# Patient Record
Sex: MALE | Race: WHITE | Employment: OTHER | URBAN - METROPOLITAN AREA
[De-identification: names, ages, dates, MRNs, and addresses within clinical notes are randomized per-mention and may not be internally consistent; named-entity substitution may affect disease eponyms.]

---

## 2017-03-20 RX ORDER — LOSARTAN POTASSIUM 50 MG/1
TABLET ORAL
Qty: 30 TABLET | Refills: 1 | Status: SHIPPED | OUTPATIENT
Start: 2017-03-20 | End: 2017-05-22 | Stop reason: SDUPTHER

## 2017-03-27 ENCOUNTER — TELEPHONE (OUTPATIENT)
Dept: FAMILY MEDICINE CLINIC | Age: 67
End: 2017-03-27

## 2017-03-27 RX ORDER — AZITHROMYCIN 250 MG/1
TABLET, FILM COATED ORAL
Qty: 1 PACKET | Refills: 0 | Status: SHIPPED | OUTPATIENT
Start: 2017-03-27 | End: 2017-04-06

## 2017-04-11 ENCOUNTER — TELEPHONE (OUTPATIENT)
Dept: FAMILY MEDICINE CLINIC | Age: 67
End: 2017-04-11

## 2017-04-12 ENCOUNTER — OFFICE VISIT (OUTPATIENT)
Dept: FAMILY MEDICINE CLINIC | Age: 67
End: 2017-04-12

## 2017-04-12 VITALS
HEART RATE: 56 BPM | WEIGHT: 240.5 LBS | OXYGEN SATURATION: 97 % | BODY MASS INDEX: 31.09 KG/M2 | TEMPERATURE: 97.7 F | SYSTOLIC BLOOD PRESSURE: 115 MMHG | DIASTOLIC BLOOD PRESSURE: 76 MMHG

## 2017-04-12 DIAGNOSIS — J32.9 RECURRENT SINUSITIS: Primary | ICD-10-CM

## 2017-04-12 PROCEDURE — 4040F PNEUMOC VAC/ADMIN/RCVD: CPT | Performed by: FAMILY MEDICINE

## 2017-04-12 PROCEDURE — 99213 OFFICE O/P EST LOW 20 MIN: CPT | Performed by: FAMILY MEDICINE

## 2017-04-12 PROCEDURE — 1036F TOBACCO NON-USER: CPT | Performed by: FAMILY MEDICINE

## 2017-04-12 PROCEDURE — G8427 DOCREV CUR MEDS BY ELIG CLIN: HCPCS | Performed by: FAMILY MEDICINE

## 2017-04-12 PROCEDURE — 1123F ACP DISCUSS/DSCN MKR DOCD: CPT | Performed by: FAMILY MEDICINE

## 2017-04-12 PROCEDURE — G8419 CALC BMI OUT NRM PARAM NOF/U: HCPCS | Performed by: FAMILY MEDICINE

## 2017-04-12 PROCEDURE — 3017F COLORECTAL CA SCREEN DOC REV: CPT | Performed by: FAMILY MEDICINE

## 2017-04-12 RX ORDER — FLUTICASONE PROPIONATE 50 MCG
2 SPRAY, SUSPENSION (ML) NASAL DAILY
Qty: 1 BOTTLE | Refills: 3 | Status: SHIPPED | OUTPATIENT
Start: 2017-04-12 | End: 2020-03-12

## 2017-04-12 RX ORDER — DOXYCYCLINE HYCLATE 100 MG/1
100 CAPSULE ORAL 2 TIMES DAILY
Qty: 20 CAPSULE | Refills: 0 | Status: SHIPPED | OUTPATIENT
Start: 2017-04-12 | End: 2017-04-22

## 2017-04-12 RX ORDER — METOPROLOL SUCCINATE 50 MG/1
25 TABLET, EXTENDED RELEASE ORAL DAILY
Qty: 1 TABLET | Refills: 0
Start: 2017-04-12 | End: 2022-04-01

## 2017-05-23 RX ORDER — LOSARTAN POTASSIUM 50 MG/1
TABLET ORAL
Qty: 30 TABLET | Refills: 5 | Status: SHIPPED | OUTPATIENT
Start: 2017-05-23 | End: 2017-11-03 | Stop reason: SDUPTHER

## 2017-07-15 LAB
BUN BLDV-MCNC: 27 MG/DL
CALCIUM SERPL-MCNC: NORMAL MG/DL
CHLORIDE BLD-SCNC: NORMAL MMOL/L
CO2: NORMAL MMOL/L
CREAT SERPL-MCNC: 1.49 MG/DL
GFR CALCULATED: NORMAL
GLUCOSE BLD-MCNC: 148 MG/DL
POTASSIUM SERPL-SCNC: NORMAL MMOL/L
SODIUM BLD-SCNC: NORMAL MMOL/L

## 2017-07-18 ENCOUNTER — TELEPHONE (OUTPATIENT)
Dept: FAMILY MEDICINE CLINIC | Age: 67
End: 2017-07-18

## 2017-08-02 DIAGNOSIS — N18.9 CKD (CHRONIC KIDNEY DISEASE), UNSPECIFIED STAGE: Primary | ICD-10-CM

## 2017-08-17 ENCOUNTER — OFFICE VISIT (OUTPATIENT)
Dept: FAMILY MEDICINE CLINIC | Age: 67
End: 2017-08-17

## 2017-08-17 VITALS
BODY MASS INDEX: 31.15 KG/M2 | OXYGEN SATURATION: 97 % | HEART RATE: 80 BPM | WEIGHT: 241 LBS | DIASTOLIC BLOOD PRESSURE: 74 MMHG | SYSTOLIC BLOOD PRESSURE: 111 MMHG

## 2017-08-17 DIAGNOSIS — R53.82 CHRONIC FATIGUE: ICD-10-CM

## 2017-08-17 DIAGNOSIS — G62.9 NEUROPATHY: Primary | ICD-10-CM

## 2017-08-17 PROCEDURE — 99213 OFFICE O/P EST LOW 20 MIN: CPT | Performed by: FAMILY MEDICINE

## 2017-08-17 PROCEDURE — 36415 COLL VENOUS BLD VENIPUNCTURE: CPT | Performed by: FAMILY MEDICINE

## 2017-08-17 PROCEDURE — G8419 CALC BMI OUT NRM PARAM NOF/U: HCPCS | Performed by: FAMILY MEDICINE

## 2017-08-17 PROCEDURE — 1036F TOBACCO NON-USER: CPT | Performed by: FAMILY MEDICINE

## 2017-08-17 PROCEDURE — G8427 DOCREV CUR MEDS BY ELIG CLIN: HCPCS | Performed by: FAMILY MEDICINE

## 2017-08-17 PROCEDURE — 3017F COLORECTAL CA SCREEN DOC REV: CPT | Performed by: FAMILY MEDICINE

## 2017-08-17 PROCEDURE — 1123F ACP DISCUSS/DSCN MKR DOCD: CPT | Performed by: FAMILY MEDICINE

## 2017-08-17 PROCEDURE — 4040F PNEUMOC VAC/ADMIN/RCVD: CPT | Performed by: FAMILY MEDICINE

## 2017-08-17 RX ORDER — GABAPENTIN 100 MG/1
CAPSULE ORAL
Qty: 90 CAPSULE | Refills: 3 | Status: SHIPPED | OUTPATIENT
Start: 2017-08-17 | End: 2017-09-18 | Stop reason: SDUPTHER

## 2017-08-17 RX ORDER — SULFAMETHOXAZOLE AND TRIMETHOPRIM 800; 160 MG/1; MG/1
TABLET ORAL
Refills: 0 | COMMUNITY
Start: 2017-08-14 | End: 2017-09-11 | Stop reason: ALTCHOICE

## 2017-08-18 LAB
GLUCOSE BLD-MCNC: 110 MG/DL (ref 70–99)
TSH REFLEX: 1.58 UIU/ML (ref 0.27–4.2)
VITAMIN B-12: 624 PG/ML (ref 211–911)

## 2017-09-11 ENCOUNTER — OFFICE VISIT (OUTPATIENT)
Dept: FAMILY MEDICINE CLINIC | Age: 67
End: 2017-09-11

## 2017-09-11 VITALS
SYSTOLIC BLOOD PRESSURE: 142 MMHG | BODY MASS INDEX: 30 KG/M2 | HEART RATE: 56 BPM | OXYGEN SATURATION: 94 % | HEIGHT: 75 IN | DIASTOLIC BLOOD PRESSURE: 84 MMHG | WEIGHT: 241.25 LBS

## 2017-09-11 DIAGNOSIS — M54.42 CHRONIC MIDLINE LOW BACK PAIN WITH BILATERAL SCIATICA: ICD-10-CM

## 2017-09-11 DIAGNOSIS — M54.41 CHRONIC MIDLINE LOW BACK PAIN WITH BILATERAL SCIATICA: ICD-10-CM

## 2017-09-11 DIAGNOSIS — G89.29 CHRONIC MIDLINE LOW BACK PAIN WITH BILATERAL SCIATICA: ICD-10-CM

## 2017-09-11 DIAGNOSIS — E78.2 MIXED HYPERLIPIDEMIA: ICD-10-CM

## 2017-09-11 DIAGNOSIS — G60.9 IDIOPATHIC PERIPHERAL NEUROPATHY: ICD-10-CM

## 2017-09-11 DIAGNOSIS — I10 ESSENTIAL HYPERTENSION: Primary | ICD-10-CM

## 2017-09-11 DIAGNOSIS — N18.3 CKD (CHRONIC KIDNEY DISEASE), STAGE 3 (MODERATE): ICD-10-CM

## 2017-09-11 PROCEDURE — 3017F COLORECTAL CA SCREEN DOC REV: CPT | Performed by: FAMILY MEDICINE

## 2017-09-11 PROCEDURE — G8417 CALC BMI ABV UP PARAM F/U: HCPCS | Performed by: FAMILY MEDICINE

## 2017-09-11 PROCEDURE — 99214 OFFICE O/P EST MOD 30 MIN: CPT | Performed by: FAMILY MEDICINE

## 2017-09-11 PROCEDURE — 1036F TOBACCO NON-USER: CPT | Performed by: FAMILY MEDICINE

## 2017-09-11 PROCEDURE — G8427 DOCREV CUR MEDS BY ELIG CLIN: HCPCS | Performed by: FAMILY MEDICINE

## 2017-09-11 PROCEDURE — 4040F PNEUMOC VAC/ADMIN/RCVD: CPT | Performed by: FAMILY MEDICINE

## 2017-09-11 PROCEDURE — 1123F ACP DISCUSS/DSCN MKR DOCD: CPT | Performed by: FAMILY MEDICINE

## 2017-09-11 RX ORDER — HYDROCODONE BITARTRATE AND ACETAMINOPHEN 5; 325 MG/1; MG/1
1 TABLET ORAL EVERY 6 HOURS PRN
Qty: 28 TABLET | Refills: 0 | Status: SHIPPED | OUTPATIENT
Start: 2017-09-11 | End: 2017-10-11

## 2017-09-11 RX ORDER — AZITHROMYCIN 250 MG/1
TABLET, FILM COATED ORAL
Qty: 6 TABLET | Refills: 0 | Status: SHIPPED | OUTPATIENT
Start: 2017-09-11 | End: 2018-01-06 | Stop reason: ALTCHOICE

## 2017-09-11 ASSESSMENT — PATIENT HEALTH QUESTIONNAIRE - PHQ9
1. LITTLE INTEREST OR PLEASURE IN DOING THINGS: 0
SUM OF ALL RESPONSES TO PHQ QUESTIONS 1-9: 0
2. FEELING DOWN, DEPRESSED OR HOPELESS: 0
SUM OF ALL RESPONSES TO PHQ9 QUESTIONS 1 & 2: 0

## 2017-09-18 ENCOUNTER — TELEPHONE (OUTPATIENT)
Dept: FAMILY MEDICINE CLINIC | Age: 67
End: 2017-09-18

## 2017-09-18 DIAGNOSIS — G62.9 NEUROPATHY: ICD-10-CM

## 2017-09-18 RX ORDER — GABAPENTIN 100 MG/1
CAPSULE ORAL
Qty: 360 CAPSULE | Refills: 3 | Status: SHIPPED | OUTPATIENT
Start: 2017-09-18 | End: 2017-09-25 | Stop reason: SDUPTHER

## 2017-09-22 ENCOUNTER — TELEPHONE (OUTPATIENT)
Dept: FAMILY MEDICINE CLINIC | Age: 67
End: 2017-09-22

## 2017-09-22 DIAGNOSIS — G62.9 NEUROPATHY: ICD-10-CM

## 2017-09-25 RX ORDER — GABAPENTIN 100 MG/1
300 CAPSULE ORAL 3 TIMES DAILY
Qty: 810 CAPSULE | Refills: 1 | Status: SHIPPED | OUTPATIENT
Start: 2017-09-25 | End: 2018-03-12 | Stop reason: SDUPTHER

## 2017-11-04 RX ORDER — LOSARTAN POTASSIUM 50 MG/1
TABLET ORAL
Qty: 30 TABLET | Refills: 5 | Status: SHIPPED | OUTPATIENT
Start: 2017-11-04 | End: 2018-05-20 | Stop reason: SDUPTHER

## 2017-12-11 ENCOUNTER — TELEPHONE (OUTPATIENT)
Dept: FAMILY MEDICINE CLINIC | Age: 67
End: 2017-12-11

## 2018-01-03 ENCOUNTER — TELEPHONE (OUTPATIENT)
Dept: FAMILY MEDICINE CLINIC | Age: 68
End: 2018-01-03

## 2018-01-03 RX ORDER — AZITHROMYCIN 250 MG/1
TABLET, FILM COATED ORAL
Qty: 1 PACKET | Refills: 0 | Status: SHIPPED | OUTPATIENT
Start: 2018-01-03 | End: 2018-01-06 | Stop reason: ALTCHOICE

## 2018-01-08 ENCOUNTER — TELEPHONE (OUTPATIENT)
Dept: FAMILY MEDICINE CLINIC | Age: 68
End: 2018-01-08

## 2018-01-08 RX ORDER — AMOXICILLIN AND CLAVULANATE POTASSIUM 875; 125 MG/1; MG/1
1 TABLET, FILM COATED ORAL 2 TIMES DAILY
Qty: 20 TABLET | Refills: 0 | Status: SHIPPED | OUTPATIENT
Start: 2018-01-08 | End: 2018-01-18

## 2018-03-12 ENCOUNTER — OFFICE VISIT (OUTPATIENT)
Dept: FAMILY MEDICINE CLINIC | Age: 68
End: 2018-03-12

## 2018-03-12 VITALS
BODY MASS INDEX: 31.01 KG/M2 | TEMPERATURE: 97.5 F | HEIGHT: 75 IN | WEIGHT: 249.38 LBS | OXYGEN SATURATION: 97 % | HEART RATE: 63 BPM | DIASTOLIC BLOOD PRESSURE: 74 MMHG | SYSTOLIC BLOOD PRESSURE: 134 MMHG

## 2018-03-12 DIAGNOSIS — B96.89 ACUTE BACTERIAL SINUSITIS: ICD-10-CM

## 2018-03-12 DIAGNOSIS — G62.9 NEUROPATHY: ICD-10-CM

## 2018-03-12 DIAGNOSIS — I10 ESSENTIAL HYPERTENSION: Primary | ICD-10-CM

## 2018-03-12 DIAGNOSIS — E78.2 MIXED HYPERLIPIDEMIA: ICD-10-CM

## 2018-03-12 DIAGNOSIS — J01.90 ACUTE BACTERIAL SINUSITIS: ICD-10-CM

## 2018-03-12 DIAGNOSIS — G60.9 IDIOPATHIC PERIPHERAL NEUROPATHY: ICD-10-CM

## 2018-03-12 DIAGNOSIS — K21.9 GASTROESOPHAGEAL REFLUX DISEASE, ESOPHAGITIS PRESENCE NOT SPECIFIED: ICD-10-CM

## 2018-03-12 LAB
A/G RATIO: 1.4 (ref 1.1–2.2)
ALBUMIN SERPL-MCNC: 4.1 G/DL (ref 3.4–5)
ALP BLD-CCNC: 67 U/L (ref 40–129)
ALT SERPL-CCNC: 53 U/L (ref 10–40)
ANION GAP SERPL CALCULATED.3IONS-SCNC: 10 MMOL/L (ref 3–16)
AST SERPL-CCNC: 27 U/L (ref 15–37)
BILIRUB SERPL-MCNC: 0.6 MG/DL (ref 0–1)
BUN BLDV-MCNC: 17 MG/DL (ref 7–20)
CALCIUM SERPL-MCNC: 9 MG/DL (ref 8.3–10.6)
CHLORIDE BLD-SCNC: 104 MMOL/L (ref 99–110)
CHOLESTEROL, TOTAL: 225 MG/DL (ref 0–199)
CO2: 27 MMOL/L (ref 21–32)
CREAT SERPL-MCNC: 0.9 MG/DL (ref 0.8–1.3)
GFR AFRICAN AMERICAN: >60
GFR NON-AFRICAN AMERICAN: >60
GLOBULIN: 2.9 G/DL
GLUCOSE BLD-MCNC: 152 MG/DL (ref 70–99)
HDLC SERPL-MCNC: 29 MG/DL (ref 40–60)
LDL CHOLESTEROL CALCULATED: ABNORMAL MG/DL
LDL CHOLESTEROL DIRECT: 140 MG/DL
POTASSIUM SERPL-SCNC: 4.5 MMOL/L (ref 3.5–5.1)
SODIUM BLD-SCNC: 141 MMOL/L (ref 136–145)
TOTAL PROTEIN: 7 G/DL (ref 6.4–8.2)
TRIGL SERPL-MCNC: 370 MG/DL (ref 0–150)
VLDLC SERPL CALC-MCNC: ABNORMAL MG/DL

## 2018-03-12 PROCEDURE — G8417 CALC BMI ABV UP PARAM F/U: HCPCS | Performed by: FAMILY MEDICINE

## 2018-03-12 PROCEDURE — 4040F PNEUMOC VAC/ADMIN/RCVD: CPT | Performed by: FAMILY MEDICINE

## 2018-03-12 PROCEDURE — 1123F ACP DISCUSS/DSCN MKR DOCD: CPT | Performed by: FAMILY MEDICINE

## 2018-03-12 PROCEDURE — 36415 COLL VENOUS BLD VENIPUNCTURE: CPT | Performed by: FAMILY MEDICINE

## 2018-03-12 PROCEDURE — 3017F COLORECTAL CA SCREEN DOC REV: CPT | Performed by: FAMILY MEDICINE

## 2018-03-12 PROCEDURE — G8484 FLU IMMUNIZE NO ADMIN: HCPCS | Performed by: FAMILY MEDICINE

## 2018-03-12 PROCEDURE — G8427 DOCREV CUR MEDS BY ELIG CLIN: HCPCS | Performed by: FAMILY MEDICINE

## 2018-03-12 PROCEDURE — 99214 OFFICE O/P EST MOD 30 MIN: CPT | Performed by: FAMILY MEDICINE

## 2018-03-12 PROCEDURE — 1036F TOBACCO NON-USER: CPT | Performed by: FAMILY MEDICINE

## 2018-03-12 RX ORDER — AMOXICILLIN 500 MG/1
1000 CAPSULE ORAL 2 TIMES DAILY
Qty: 40 CAPSULE | Refills: 0 | Status: SHIPPED | OUTPATIENT
Start: 2018-03-12 | End: 2018-03-22

## 2018-03-12 RX ORDER — GABAPENTIN 400 MG/1
400 CAPSULE ORAL 2 TIMES DAILY
Qty: 60 CAPSULE | Refills: 5 | Status: SHIPPED | OUTPATIENT
Start: 2018-03-12 | End: 2018-05-01 | Stop reason: ALTCHOICE

## 2018-03-12 NOTE — PATIENT INSTRUCTIONS
Please read the healthy family handout that you were given and share it with your family. Please compare this printed medication list with your medications at home to be sure they are the same. If you have any medications that are different please contact us immediately at [phone]. Also review your allergies that we have listed, these may also include medications that you have not been able to tolerate, make sure everything listed is correct. If you have any allergies that are different please contact us immediately at [phone].

## 2018-03-12 NOTE — PROGRESS NOTES
breath sounds normal. No respiratory distress. He has no wheezes. He has no rales. Abdominal: Soft. He exhibits no distension and no mass. There is no hepatosplenomegaly. There is no tenderness. There is no rebound and no guarding. Musculoskeletal: He exhibits no edema. Lymphadenopathy:     He has no cervical adenopathy. Right: No supraclavicular adenopathy present. Neurological: He is alert and oriented to person, place, and time. Skin: Skin is warm. No cyanosis. Psychiatric: He has a normal mood and affect. His behavior is normal. Judgment and thought content normal.   Vitals reviewed. Assessment and Plan:  1. Essential hypertension  Comprehensive Metabolic Panel   2. Gastroesophageal reflux disease, esophagitis presence not specified     3. Mixed hyperlipidemia  Lipid Panel   4. Idiopathic peripheral neuropathy     5. Neuropathy (HCC)  gabapentin (NEURONTIN) 400 MG capsule   6. Acute bacterial sinusitis  amoxicillin (AMOXIL) 500 MG capsule   Increase Neurontin to 400 mg b.i.d. and can go up to 3 times a day if needed. Consider Lyrica  The problems listed in the assessment are stable unless otherwise indicated. He  was instructed to continue their current medications and treatment for the above  problems unless otherwise indicated above. Age-specific preventative medicine recommendations were reviewed with patient today and the Healthy Family Handout was given to patient. Avoid tobacco products exposure. Follow up in 6 mo. Call or return to office for any problems that develop before the next scheduled follow-up appointment. Taras Alejandro M.D. Parts of this note were completed using voice recognition transcription. Every effort was made to ensure accuracy; however, inadvertent transcription errors may be present.

## 2018-03-13 DIAGNOSIS — R73.9 ELEVATED BLOOD SUGAR: Primary | ICD-10-CM

## 2018-04-16 ENCOUNTER — NURSE ONLY (OUTPATIENT)
Dept: FAMILY MEDICINE CLINIC | Age: 68
End: 2018-04-16

## 2018-04-16 DIAGNOSIS — R73.09 ELEVATED GLUCOSE: Primary | ICD-10-CM

## 2018-04-16 DIAGNOSIS — R73.9 ELEVATED BLOOD SUGAR: ICD-10-CM

## 2018-04-16 PROCEDURE — 36415 COLL VENOUS BLD VENIPUNCTURE: CPT | Performed by: FAMILY MEDICINE

## 2018-04-17 PROBLEM — E11.9 TYPE 2 DIABETES MELLITUS WITHOUT COMPLICATION, WITHOUT LONG-TERM CURRENT USE OF INSULIN (HCC): Status: ACTIVE | Noted: 2018-04-17

## 2018-04-17 LAB
ESTIMATED AVERAGE GLUCOSE: 151.3 MG/DL
HBA1C MFR BLD: 6.9 %

## 2018-05-01 ENCOUNTER — OFFICE VISIT (OUTPATIENT)
Dept: FAMILY MEDICINE CLINIC | Age: 68
End: 2018-05-01

## 2018-05-01 VITALS
HEART RATE: 57 BPM | DIASTOLIC BLOOD PRESSURE: 76 MMHG | WEIGHT: 241.13 LBS | OXYGEN SATURATION: 97 % | SYSTOLIC BLOOD PRESSURE: 124 MMHG | BODY MASS INDEX: 30.54 KG/M2 | TEMPERATURE: 97.6 F

## 2018-05-01 DIAGNOSIS — E11.9 TYPE 2 DIABETES MELLITUS WITHOUT COMPLICATION, WITHOUT LONG-TERM CURRENT USE OF INSULIN (HCC): Primary | ICD-10-CM

## 2018-05-01 DIAGNOSIS — G60.9 IDIOPATHIC PERIPHERAL NEUROPATHY: ICD-10-CM

## 2018-05-01 PROCEDURE — 3044F HG A1C LEVEL LT 7.0%: CPT | Performed by: FAMILY MEDICINE

## 2018-05-01 PROCEDURE — 3017F COLORECTAL CA SCREEN DOC REV: CPT | Performed by: FAMILY MEDICINE

## 2018-05-01 PROCEDURE — 1036F TOBACCO NON-USER: CPT | Performed by: FAMILY MEDICINE

## 2018-05-01 PROCEDURE — 4040F PNEUMOC VAC/ADMIN/RCVD: CPT | Performed by: FAMILY MEDICINE

## 2018-05-01 PROCEDURE — 2022F DILAT RTA XM EVC RTNOPTHY: CPT | Performed by: FAMILY MEDICINE

## 2018-05-01 PROCEDURE — G8427 DOCREV CUR MEDS BY ELIG CLIN: HCPCS | Performed by: FAMILY MEDICINE

## 2018-05-01 PROCEDURE — 99214 OFFICE O/P EST MOD 30 MIN: CPT | Performed by: FAMILY MEDICINE

## 2018-05-01 PROCEDURE — 1123F ACP DISCUSS/DSCN MKR DOCD: CPT | Performed by: FAMILY MEDICINE

## 2018-05-01 PROCEDURE — G8417 CALC BMI ABV UP PARAM F/U: HCPCS | Performed by: FAMILY MEDICINE

## 2018-05-01 RX ORDER — CYANOCOBALAMIN (VITAMIN B-12) 2000 MCG
TABLET ORAL
COMMUNITY
End: 2020-03-12

## 2018-05-21 RX ORDER — LOSARTAN POTASSIUM 50 MG/1
TABLET ORAL
Qty: 30 TABLET | Refills: 5 | Status: SHIPPED | OUTPATIENT
Start: 2018-05-21 | End: 2018-11-13 | Stop reason: SDUPTHER

## 2018-08-01 ENCOUNTER — TELEPHONE (OUTPATIENT)
Dept: FAMILY MEDICINE CLINIC | Age: 68
End: 2018-08-01

## 2018-08-01 RX ORDER — AZITHROMYCIN 250 MG/1
TABLET, FILM COATED ORAL
Qty: 1 PACKET | Refills: 0 | Status: SHIPPED | OUTPATIENT
Start: 2018-08-01 | End: 2018-08-05

## 2018-08-01 NOTE — TELEPHONE ENCOUNTER
Patient is having chest congestion, cough productive yellow, and hoarseness, no sob, no fever. Patient is requesting zpak to be called in. He is leaving town on Thursday morning. Advised patient you are not in office until Thursday, he refused appts with anyone in the office today and states you have called it in for him before.

## 2018-08-10 DIAGNOSIS — G62.9 NEUROPATHY: ICD-10-CM

## 2018-08-10 RX ORDER — GABAPENTIN 400 MG/1
400 CAPSULE ORAL 2 TIMES DAILY
Qty: 60 CAPSULE | Refills: 5 | Status: CANCELLED | OUTPATIENT
Start: 2018-08-10 | End: 2019-02-10

## 2018-08-10 NOTE — TELEPHONE ENCOUNTER
Patient called back and stated he was still taking the Neurontin and requests a refill.  Next OV 9-17-18

## 2018-08-13 RX ORDER — GABAPENTIN 100 MG/1
300 CAPSULE ORAL 2 TIMES DAILY
Qty: 180 CAPSULE | Refills: 0 | OUTPATIENT
Start: 2018-08-13 | End: 2018-09-12

## 2018-08-20 DIAGNOSIS — G62.9 NEUROPATHY: ICD-10-CM

## 2018-08-21 RX ORDER — GABAPENTIN 100 MG/1
600 CAPSULE ORAL 2 TIMES DAILY
Qty: 120 CAPSULE | Refills: 0 | Status: SHIPPED | OUTPATIENT
Start: 2018-08-21 | End: 2018-09-17 | Stop reason: SDUPTHER

## 2018-09-17 ENCOUNTER — OFFICE VISIT (OUTPATIENT)
Dept: FAMILY MEDICINE CLINIC | Age: 68
End: 2018-09-17

## 2018-09-17 VITALS
DIASTOLIC BLOOD PRESSURE: 76 MMHG | BODY MASS INDEX: 29.14 KG/M2 | SYSTOLIC BLOOD PRESSURE: 127 MMHG | OXYGEN SATURATION: 95 % | TEMPERATURE: 97.5 F | HEART RATE: 57 BPM | HEIGHT: 75 IN | WEIGHT: 234.4 LBS

## 2018-09-17 DIAGNOSIS — E11.9 TYPE 2 DIABETES MELLITUS WITHOUT COMPLICATION, WITHOUT LONG-TERM CURRENT USE OF INSULIN (HCC): Primary | ICD-10-CM

## 2018-09-17 DIAGNOSIS — E78.2 MIXED HYPERLIPIDEMIA: ICD-10-CM

## 2018-09-17 DIAGNOSIS — N18.30 STAGE 3 CHRONIC KIDNEY DISEASE (HCC): ICD-10-CM

## 2018-09-17 DIAGNOSIS — I10 ESSENTIAL HYPERTENSION: ICD-10-CM

## 2018-09-17 LAB
A/G RATIO: 1.4 (ref 1.1–2.2)
ALBUMIN SERPL-MCNC: 4.2 G/DL (ref 3.4–5)
ALP BLD-CCNC: 69 U/L (ref 40–129)
ALT SERPL-CCNC: 21 U/L (ref 10–40)
ANION GAP SERPL CALCULATED.3IONS-SCNC: 16 MMOL/L (ref 3–16)
AST SERPL-CCNC: 15 U/L (ref 15–37)
BILIRUB SERPL-MCNC: 0.9 MG/DL (ref 0–1)
BUN BLDV-MCNC: 18 MG/DL (ref 7–20)
CALCIUM SERPL-MCNC: 9.4 MG/DL (ref 8.3–10.6)
CHLORIDE BLD-SCNC: 104 MMOL/L (ref 99–110)
CHOLESTEROL, TOTAL: 199 MG/DL (ref 0–199)
CO2: 23 MMOL/L (ref 21–32)
CREAT SERPL-MCNC: 1.1 MG/DL (ref 0.8–1.3)
GFR AFRICAN AMERICAN: >60
GFR NON-AFRICAN AMERICAN: >60
GLOBULIN: 2.9 G/DL
GLUCOSE BLD-MCNC: 127 MG/DL (ref 70–99)
HDLC SERPL-MCNC: 31 MG/DL (ref 40–60)
LDL CHOLESTEROL CALCULATED: 120 MG/DL
POTASSIUM SERPL-SCNC: 5.3 MMOL/L (ref 3.5–5.1)
SODIUM BLD-SCNC: 143 MMOL/L (ref 136–145)
TOTAL PROTEIN: 7.1 G/DL (ref 6.4–8.2)
TRIGL SERPL-MCNC: 239 MG/DL (ref 0–150)
VLDLC SERPL CALC-MCNC: 48 MG/DL

## 2018-09-17 PROCEDURE — 1036F TOBACCO NON-USER: CPT | Performed by: FAMILY MEDICINE

## 2018-09-17 PROCEDURE — 2022F DILAT RTA XM EVC RTNOPTHY: CPT | Performed by: FAMILY MEDICINE

## 2018-09-17 PROCEDURE — G8427 DOCREV CUR MEDS BY ELIG CLIN: HCPCS | Performed by: FAMILY MEDICINE

## 2018-09-17 PROCEDURE — G8510 SCR DEP NEG, NO PLAN REQD: HCPCS | Performed by: FAMILY MEDICINE

## 2018-09-17 PROCEDURE — 3017F COLORECTAL CA SCREEN DOC REV: CPT | Performed by: FAMILY MEDICINE

## 2018-09-17 PROCEDURE — 1101F PT FALLS ASSESS-DOCD LE1/YR: CPT | Performed by: FAMILY MEDICINE

## 2018-09-17 PROCEDURE — 99214 OFFICE O/P EST MOD 30 MIN: CPT | Performed by: FAMILY MEDICINE

## 2018-09-17 PROCEDURE — 1123F ACP DISCUSS/DSCN MKR DOCD: CPT | Performed by: FAMILY MEDICINE

## 2018-09-17 PROCEDURE — 36415 COLL VENOUS BLD VENIPUNCTURE: CPT | Performed by: FAMILY MEDICINE

## 2018-09-17 PROCEDURE — 4040F PNEUMOC VAC/ADMIN/RCVD: CPT | Performed by: FAMILY MEDICINE

## 2018-09-17 PROCEDURE — G8417 CALC BMI ABV UP PARAM F/U: HCPCS | Performed by: FAMILY MEDICINE

## 2018-09-17 PROCEDURE — 3044F HG A1C LEVEL LT 7.0%: CPT | Performed by: FAMILY MEDICINE

## 2018-09-17 RX ORDER — AZITHROMYCIN 250 MG/1
TABLET, FILM COATED ORAL
Qty: 6 TABLET | Refills: 0 | Status: SHIPPED | OUTPATIENT
Start: 2018-09-17 | End: 2019-02-28 | Stop reason: ALTCHOICE

## 2018-09-17 RX ORDER — GABAPENTIN 100 MG/1
300 CAPSULE ORAL 2 TIMES DAILY
Qty: 90 CAPSULE | Refills: 0
Start: 2018-09-17 | End: 2018-09-18 | Stop reason: SDUPTHER

## 2018-09-17 RX ORDER — GABAPENTIN 100 MG/1
300 CAPSULE ORAL 2 TIMES DAILY
COMMUNITY
End: 2018-09-17 | Stop reason: SDUPTHER

## 2018-09-17 ASSESSMENT — PATIENT HEALTH QUESTIONNAIRE - PHQ9
SUM OF ALL RESPONSES TO PHQ QUESTIONS 1-9: 0
1. LITTLE INTEREST OR PLEASURE IN DOING THINGS: 0
SUM OF ALL RESPONSES TO PHQ9 QUESTIONS 1 & 2: 0
2. FEELING DOWN, DEPRESSED OR HOPELESS: 0
SUM OF ALL RESPONSES TO PHQ QUESTIONS 1-9: 0

## 2018-09-18 LAB
ESTIMATED AVERAGE GLUCOSE: 134.1 MG/DL
HBA1C MFR BLD: 6.3 %

## 2018-09-18 RX ORDER — GABAPENTIN 100 MG/1
300 CAPSULE ORAL 2 TIMES DAILY
Qty: 90 CAPSULE | Refills: 0 | Status: SHIPPED | OUTPATIENT
Start: 2018-09-18 | End: 2018-10-02 | Stop reason: SDUPTHER

## 2018-11-13 RX ORDER — LOSARTAN POTASSIUM 50 MG/1
TABLET ORAL
Qty: 90 TABLET | Refills: 1 | Status: SHIPPED | OUTPATIENT
Start: 2018-11-13 | End: 2019-05-08 | Stop reason: SDUPTHER

## 2018-11-28 ENCOUNTER — TELEPHONE (OUTPATIENT)
Dept: FAMILY MEDICINE CLINIC | Age: 68
End: 2018-11-28

## 2018-11-28 DIAGNOSIS — G89.29 CHRONIC MIDLINE LOW BACK PAIN WITH BILATERAL SCIATICA: Primary | ICD-10-CM

## 2018-11-28 DIAGNOSIS — M54.42 CHRONIC MIDLINE LOW BACK PAIN WITH BILATERAL SCIATICA: Primary | ICD-10-CM

## 2018-11-28 DIAGNOSIS — M54.41 CHRONIC MIDLINE LOW BACK PAIN WITH BILATERAL SCIATICA: Primary | ICD-10-CM

## 2018-11-29 RX ORDER — PREGABALIN 75 MG/1
75 CAPSULE ORAL 2 TIMES DAILY
Qty: 60 CAPSULE | Refills: 0 | OUTPATIENT
Start: 2018-11-29 | End: 2019-09-30 | Stop reason: ALTCHOICE

## 2019-01-07 ENCOUNTER — TELEPHONE (OUTPATIENT)
Dept: FAMILY MEDICINE CLINIC | Age: 69
End: 2019-01-07

## 2019-01-07 RX ORDER — GABAPENTIN 100 MG/1
300 CAPSULE ORAL 3 TIMES DAILY
Qty: 270 CAPSULE | Refills: 0 | Status: SHIPPED | OUTPATIENT
Start: 2019-01-07 | End: 2019-02-12 | Stop reason: SDUPTHER

## 2019-02-28 ENCOUNTER — TELEPHONE (OUTPATIENT)
Dept: FAMILY MEDICINE CLINIC | Age: 69
End: 2019-02-28

## 2019-02-28 RX ORDER — AZITHROMYCIN 250 MG/1
250 TABLET, FILM COATED ORAL SEE ADMIN INSTRUCTIONS
Qty: 6 TABLET | Refills: 0 | Status: SHIPPED | OUTPATIENT
Start: 2019-02-28 | End: 2019-03-05

## 2019-03-18 ENCOUNTER — OFFICE VISIT (OUTPATIENT)
Dept: FAMILY MEDICINE CLINIC | Age: 69
End: 2019-03-18
Payer: MEDICARE

## 2019-03-18 VITALS
SYSTOLIC BLOOD PRESSURE: 106 MMHG | HEART RATE: 49 BPM | TEMPERATURE: 97.8 F | DIASTOLIC BLOOD PRESSURE: 58 MMHG | OXYGEN SATURATION: 95 % | WEIGHT: 237 LBS | BODY MASS INDEX: 30.02 KG/M2

## 2019-03-18 DIAGNOSIS — I10 ESSENTIAL HYPERTENSION: ICD-10-CM

## 2019-03-18 DIAGNOSIS — E78.2 MIXED HYPERLIPIDEMIA: ICD-10-CM

## 2019-03-18 DIAGNOSIS — J32.9 RECURRENT SINUSITIS: ICD-10-CM

## 2019-03-18 DIAGNOSIS — E11.9 TYPE 2 DIABETES MELLITUS WITHOUT COMPLICATION, WITHOUT LONG-TERM CURRENT USE OF INSULIN (HCC): Primary | ICD-10-CM

## 2019-03-18 DIAGNOSIS — N18.30 STAGE 3 CHRONIC KIDNEY DISEASE (HCC): ICD-10-CM

## 2019-03-18 LAB
A/G RATIO: 1.4 (ref 1.1–2.2)
ALBUMIN SERPL-MCNC: 4.2 G/DL (ref 3.4–5)
ALP BLD-CCNC: 73 U/L (ref 40–129)
ALT SERPL-CCNC: 23 U/L (ref 10–40)
ANION GAP SERPL CALCULATED.3IONS-SCNC: 10 MMOL/L (ref 3–16)
AST SERPL-CCNC: 20 U/L (ref 15–37)
BILIRUB SERPL-MCNC: 0.7 MG/DL (ref 0–1)
BUN BLDV-MCNC: 19 MG/DL (ref 7–20)
CALCIUM SERPL-MCNC: 9 MG/DL (ref 8.3–10.6)
CHLORIDE BLD-SCNC: 102 MMOL/L (ref 99–110)
CHOLESTEROL, TOTAL: 235 MG/DL (ref 0–199)
CO2: 26 MMOL/L (ref 21–32)
CREAT SERPL-MCNC: 1 MG/DL (ref 0.8–1.3)
GFR AFRICAN AMERICAN: >60
GFR NON-AFRICAN AMERICAN: >60
GLOBULIN: 3.1 G/DL
GLUCOSE BLD-MCNC: 112 MG/DL (ref 70–99)
HDLC SERPL-MCNC: 36 MG/DL (ref 40–60)
LDL CHOLESTEROL CALCULATED: 153 MG/DL
POTASSIUM SERPL-SCNC: 5.1 MMOL/L (ref 3.5–5.1)
SODIUM BLD-SCNC: 138 MMOL/L (ref 136–145)
TOTAL PROTEIN: 7.3 G/DL (ref 6.4–8.2)
TRIGL SERPL-MCNC: 229 MG/DL (ref 0–150)
VLDLC SERPL CALC-MCNC: 46 MG/DL

## 2019-03-18 PROCEDURE — G8484 FLU IMMUNIZE NO ADMIN: HCPCS | Performed by: FAMILY MEDICINE

## 2019-03-18 PROCEDURE — 1101F PT FALLS ASSESS-DOCD LE1/YR: CPT | Performed by: FAMILY MEDICINE

## 2019-03-18 PROCEDURE — 99214 OFFICE O/P EST MOD 30 MIN: CPT | Performed by: FAMILY MEDICINE

## 2019-03-18 PROCEDURE — 1123F ACP DISCUSS/DSCN MKR DOCD: CPT | Performed by: FAMILY MEDICINE

## 2019-03-18 PROCEDURE — G8428 CUR MEDS NOT DOCUMENT: HCPCS | Performed by: FAMILY MEDICINE

## 2019-03-18 PROCEDURE — G8417 CALC BMI ABV UP PARAM F/U: HCPCS | Performed by: FAMILY MEDICINE

## 2019-03-18 PROCEDURE — 2022F DILAT RTA XM EVC RTNOPTHY: CPT | Performed by: FAMILY MEDICINE

## 2019-03-18 PROCEDURE — 3017F COLORECTAL CA SCREEN DOC REV: CPT | Performed by: FAMILY MEDICINE

## 2019-03-18 PROCEDURE — 36415 COLL VENOUS BLD VENIPUNCTURE: CPT | Performed by: FAMILY MEDICINE

## 2019-03-18 PROCEDURE — 4040F PNEUMOC VAC/ADMIN/RCVD: CPT | Performed by: FAMILY MEDICINE

## 2019-03-18 PROCEDURE — 1036F TOBACCO NON-USER: CPT | Performed by: FAMILY MEDICINE

## 2019-03-18 PROCEDURE — 3046F HEMOGLOBIN A1C LEVEL >9.0%: CPT | Performed by: FAMILY MEDICINE

## 2019-03-18 RX ORDER — DOXYCYCLINE HYCLATE 100 MG/1
100 CAPSULE ORAL 2 TIMES DAILY
Qty: 20 CAPSULE | Refills: 0 | Status: SHIPPED | OUTPATIENT
Start: 2019-03-18 | End: 2019-03-28

## 2019-03-18 RX ORDER — POTASSIUM CITRATE 10 MEQ/1
1 TABLET, EXTENDED RELEASE ORAL
COMMUNITY

## 2019-03-18 RX ORDER — AZITHROMYCIN 250 MG/1
250 TABLET, FILM COATED ORAL SEE ADMIN INSTRUCTIONS
Qty: 6 TABLET | Refills: 0 | Status: SHIPPED | OUTPATIENT
Start: 2019-03-18 | End: 2019-03-23

## 2019-03-19 LAB
ESTIMATED AVERAGE GLUCOSE: 145.6 MG/DL
HBA1C MFR BLD: 6.7 %

## 2019-04-08 ENCOUNTER — TELEPHONE (OUTPATIENT)
Dept: FAMILY MEDICINE CLINIC | Age: 69
End: 2019-04-08

## 2019-04-08 NOTE — TELEPHONE ENCOUNTER
Patient was seen on 3/18 was prescribed doxycycline, zpak for recurrent sinusitis, states not improved much has a dry cough, tickle in his throat, hoarse, blowing out clear mucous, please advise.

## 2019-04-09 RX ORDER — CETIRIZINE HYDROCHLORIDE 10 MG/1
10 TABLET ORAL DAILY
Qty: 30 TABLET | Refills: 0 | COMMUNITY
Start: 2019-04-09 | End: 2020-03-12

## 2019-04-23 ENCOUNTER — OFFICE VISIT (OUTPATIENT)
Dept: FAMILY MEDICINE CLINIC | Age: 69
End: 2019-04-23
Payer: MEDICARE

## 2019-04-23 VITALS
BODY MASS INDEX: 30.55 KG/M2 | SYSTOLIC BLOOD PRESSURE: 117 MMHG | HEART RATE: 68 BPM | TEMPERATURE: 98 F | DIASTOLIC BLOOD PRESSURE: 76 MMHG | OXYGEN SATURATION: 95 % | WEIGHT: 241.2 LBS

## 2019-04-23 DIAGNOSIS — J30.2 SEASONAL ALLERGIC RHINITIS, UNSPECIFIED TRIGGER: Primary | ICD-10-CM

## 2019-04-23 PROCEDURE — 99213 OFFICE O/P EST LOW 20 MIN: CPT | Performed by: NURSE PRACTITIONER

## 2019-04-23 PROCEDURE — 1036F TOBACCO NON-USER: CPT | Performed by: NURSE PRACTITIONER

## 2019-04-23 PROCEDURE — 3017F COLORECTAL CA SCREEN DOC REV: CPT | Performed by: NURSE PRACTITIONER

## 2019-04-23 PROCEDURE — 1123F ACP DISCUSS/DSCN MKR DOCD: CPT | Performed by: NURSE PRACTITIONER

## 2019-04-23 PROCEDURE — G8417 CALC BMI ABV UP PARAM F/U: HCPCS | Performed by: NURSE PRACTITIONER

## 2019-04-23 PROCEDURE — 4040F PNEUMOC VAC/ADMIN/RCVD: CPT | Performed by: NURSE PRACTITIONER

## 2019-04-23 PROCEDURE — G8427 DOCREV CUR MEDS BY ELIG CLIN: HCPCS | Performed by: NURSE PRACTITIONER

## 2019-04-23 RX ORDER — MONTELUKAST SODIUM 10 MG/1
10 TABLET ORAL DAILY
Qty: 30 TABLET | Refills: 3 | Status: SHIPPED | OUTPATIENT
Start: 2019-04-23 | End: 2022-04-01 | Stop reason: ALTCHOICE

## 2019-04-23 ASSESSMENT — ENCOUNTER SYMPTOMS
GASTROINTESTINAL NEGATIVE: 1
COUGH: 1
EYES NEGATIVE: 1
RHINORRHEA: 1

## 2019-04-23 NOTE — PROGRESS NOTES
Subjective:      Patient ID: Douglas Shi is a 70 y.o. male. HPI    Chief Complaint   Patient presents with    Cough    Congestion     Allergic Rhinitis  Douglas Shi is here for evaluation of possible allergic rhinitis. Patient's symptoms include clear rhinorrhea, itchy palate and postnasal drip. These symptoms are seasonal. Current triggers include exposure to no known precipitant. The patient has been suffering from these symptoms for approximately 2 week. The patient has tried over the counter medications with inadequate relief of symptoms. Immunotherapy has never been tried. The patient has never had nasal polyps. The patient has no history of asthma. The patient has no history of eczema. The patient does not suffer from frequent sinopulmonary infections. The patient has not had sinus surgery in the past.       Review of Systems   Constitutional: Negative for appetite change, chills and fever. HENT: Positive for postnasal drip and rhinorrhea (clear). Itchy soft palate and ears   Eyes: Negative. Respiratory: Positive for cough (occasional due to tickle in throat). Cardiovascular: Negative. Gastrointestinal: Negative. Endocrine: Negative. Genitourinary: Negative. Musculoskeletal: Negative. Allergic/Immunologic: Positive for environmental allergies. Neurological: Negative. Hematological: Negative. Psychiatric/Behavioral: Negative.         Patient Active Problem List   Diagnosis    GERD (gastroesophageal reflux disease)    Chronic eczema    Recurrent sinusitis    Tear of medial meniscus of knee    Gout    Benign non-nodular prostatic hyperplasia without lower urinary tract symptoms    CKD (chronic kidney disease)    Essential hypertension    Mixed hyperlipidemia    Idiopathic peripheral neuropathy    Chronic midline low back pain with bilateral sciatica    Type 2 diabetes mellitus without complication, without long-term current use of insulin ([facility] Outpatient Medications Marked as Taking for the 4/23/19 encounter (Office Visit) with MARCIO Chow CNP   Medication Sig Dispense Refill    cetirizine (ZYRTEC) 10 MG tablet Take 1 tablet by mouth daily 30 tablet 0    potassium citrate (UROCIT-K 10) 10 MEQ (1080 MG) extended release tablet Take 1 tablet by mouth      losartan (COZAAR) 50 MG tablet take 1 tablet by mouth once daily 90 tablet 1    Pyridoxine HCl (B-6 PO) Take 4 mg by mouth      Cyanocobalamin (B-12) 2000 MCG TABS Take by mouth      Benfotiamine 150 MG CAPS Take 300 mg by mouth      Alpha-Lipoic Acid 150 MG CAPS Take by mouth      metoprolol succinate (TOPROL XL) 50 MG extended release tablet Take 0.5 tablets by mouth daily 1 tablet 0    fluticasone (FLONASE) 50 MCG/ACT nasal spray 2 sprays by Nasal route daily In each nostril 1 Bottle 3    MAGNESIUM PO Take by mouth      Cholecalciferol (VITAMIN D PO) Take  by mouth.  DOXYLAMINE SUCCINATE, SLEEP, PO Take  by mouth.  GLUCOSAMINE PO Take  by mouth.  allopurinol (ZYLOPRIM) 100 MG tablet Take 100 mg by mouth daily.  FLAXSEED by Does not apply route.  FIBER PO Take  by mouth.  Omeprazole (PRILOSEC PO) Take  by mouth.  FISH OIL by Does not apply route.  aspirin 81 MG EC tablet Take 81 mg by mouth daily Taking every other day      SOYA LECITHIN PO Take  by mouth.  GUAIFENESIN by Does not apply route.          Allergies   Allergen Reactions    Ace Inhibitors     Cholestyramine Nausea Only    Hctz      Dizziness    Lipitor     Losartan Potassium     Pravachol [Pravastatin Sodium] Other (See Comments)     Headaches Fluttering    Zetia [Ezetimibe]     Zocor [Simvastatin]        Social History     Tobacco Use    Smoking status: Never Smoker    Smokeless tobacco: Never Used   Substance Use Topics    Alcohol use: No       Objective:   /76   Pulse 68   Temp 98 °F (36.7 °C) (Oral)   Wt 241 lb 3.2 oz (109.4 kg)   SpO2 95%   BMI 30.55 kg/m²     Physical Exam   Constitutional: He is oriented to person, place, and time. Vital signs are normal. He appears well-developed and well-nourished. He is cooperative. He does not have a sickly appearance. No distress. HENT:   Head: Normocephalic and atraumatic. Right Ear: External ear and ear canal normal. A middle ear effusion is present. Left Ear: External ear and ear canal normal. A middle ear effusion is present. Nose: Nose normal. Right sinus exhibits no maxillary sinus tenderness and no frontal sinus tenderness. Left sinus exhibits no maxillary sinus tenderness and no frontal sinus tenderness. Mouth/Throat: Uvula is midline, oropharynx is clear and moist and mucous membranes are normal.   Eyes: Conjunctivae and EOM are normal.   Neck: Neck supple. Cardiovascular: Normal rate, regular rhythm, normal heart sounds and intact distal pulses. Pulmonary/Chest: Effort normal and breath sounds normal. No accessory muscle usage. No respiratory distress. Abdominal: Soft. Bowel sounds are normal.   Lymphadenopathy:     He has no cervical adenopathy. Neurological: He is alert and oriented to person, place, and time. Skin: Skin is warm and dry. No rash noted. Psychiatric: He has a normal mood and affect. His speech is normal and behavior is normal.       Assessment/Plan:   1. Seasonal allergic rhinitis, unspecified trigger  Patient presents today with clear rhinorrhea, itchy palate/ears and postnasal drip. Patient reports intermittent cough related to tickle in throat. Patient reports symptoms have been present in a seasonal pattern. Patient reports some improvement with Claritin and Flonase however symptoms persist.  Discussed options would recommend patient try Singulair 10 mg at 8 as. Advised patient to continue with Flonase and Claritin. Also encourage patient to drink plenty of fluids and follow up with ENT as scheduled.   Encourage patient to call with any questions or concerns. Also see patient instructions. Patient verbalized understanding and agreeable to plan. - montelukast (SINGULAIR) 10 MG tablet; Take 1 tablet by mouth daily  Dispense: 30 tablet;  Refill: 3

## 2019-04-23 NOTE — PATIENT INSTRUCTIONS
Please read the healthy family handout that you were given and share it with your family. Please compare this printed medication list with your medications at home to be sure they are the same. If you have any medications that are different please contact us immediately at [phone]. Also review your allergies that we have listed, these may also include medications that you have not been able to tolerate, make sure everything listed is correct. If you have any allergies that are different please contact us immediately at [phone]. Patient Education     Drink approximately 64 ounces of water per day, continue the claritin and the flonase. Add the Singulair as directed and Patient to f/u if no better or worsening of symptoms. Managing Your Allergies: Care Instructions  Your Care Instructions    Managing your allergies is an important part of staying healthy. Your doctor will help you find out what may be causing the allergies. Common causes of allergy symptoms are house dust and dust mites, animal dander, mold, and pollen. As soon as you know what triggers your symptoms, try to reduce your exposure to your triggers. This can help prevent allergy symptoms, asthma, and other health problems. Ask your doctor about allergy medicine or immunotherapy. These treatments may help reduce or prevent allergy symptoms. Follow-up care is a key part of your treatment and safety. Be sure to make and go to all appointments, and call your doctor if you are having problems. It's also a good idea to know your test results and keep a list of the medicines you take. How can you care for yourself at home? · If you think that dust or dust mites are causing your allergies:  ? Wash sheets, pillowcases, and other bedding every week in hot water. ? Use airtight, dust-proof covers for pillows, duvets, and mattresses. Avoid plastic covers, because they tend to tear quickly and do not \"breathe. \" Wash according to the sure to contact your doctor if:    · Your allergies get worse.     · You need help controlling your allergies.     · You have questions about allergy testing.     · You do not get better as expected. Where can you learn more? Go to [email or web address] org and sign in to your [employer] account. Enter L249 in the [facility] box to learn more about \"Managing Your Allergies: Care Instructions. \"     If you do not have an account, please click on the \"Sign Up Now\" link. Current as of: June 27, 2018  Content Version: 11.9  © 0099-2656 [employer]. Care instructions adapted under license by [facility] ([facility]). If you have questions about a medical condition or this instruction, always ask your healthcare professional. [address] any warranty or liability for your use of this information. Patient Education        Learning About Leukotriene Modifiers  Introduction    Leukotriene (say \"loo-koh-TRY-een\") modifiers are drugs used for asthma and hay fever. They treat asthma symptoms in adults. And in children, they improve how well the lungs work. They are not used to treat asthma attacks. They also reduce symptoms of hay fever. These include sneezing and a runny or stuffy nose. Examples  · Montelukast (Singulair)  · Zafirlukast (Accolate)  · Zileuton (Zyflo)  Side effects  · Vomiting. · Diarrhea. · A headache. You may have other side effects or reactions. Check the information that comes with your medicine. What to know about taking these medicines  · These drugs may help if your asthma is caused by exercise, aspirin, or allergies. · You may need to have blood tests during the first 6 months of treatment. The tests check for liver damage. · Take your medicines exactly as prescribed. Call your doctor if you think you are having a problem with your medicine. · Check with your doctor or pharmacist before you use any other medicines.  This includes over-the-counter drugs. Make sure your doctor knows all of the medicines you take. This includes vitamins, herbs, and supplements. Taking some drugs together can cause problems. Where can you learn more? Go to [email or web address] org and sign in to your "[employer]" account. Enter D919 in the [employer] box to learn more about \"Learning About Leukotriene Modifiers. \"     If you do not have an account, please click on the \"Sign Up Now\" link. Current as of: September 5, 2018  Content Version: 11.9  © 3954-7372 [employer]. Care instructions adapted under license by [facility] ([facility]). If you have questions about a medical condition or this instruction, always ask your healthcare professional. [address] any warranty or liability for your use of this information.

## 2019-05-08 RX ORDER — LOSARTAN POTASSIUM 50 MG/1
TABLET ORAL
Qty: 90 TABLET | Refills: 1 | Status: SHIPPED | OUTPATIENT
Start: 2019-05-08 | End: 2019-11-02 | Stop reason: SDUPTHER

## 2019-05-08 NOTE — TELEPHONE ENCOUNTER
Refilled medication per verbal order from provider.   Future appt scheduled 09/30/2019  Last appt 03/18/2019

## 2019-09-30 ENCOUNTER — OFFICE VISIT (OUTPATIENT)
Dept: FAMILY MEDICINE CLINIC | Age: 69
End: 2019-09-30
Payer: MEDICARE

## 2019-09-30 VITALS
HEIGHT: 75 IN | DIASTOLIC BLOOD PRESSURE: 65 MMHG | SYSTOLIC BLOOD PRESSURE: 100 MMHG | OXYGEN SATURATION: 94 % | HEART RATE: 54 BPM | WEIGHT: 233 LBS | TEMPERATURE: 97.6 F | BODY MASS INDEX: 28.97 KG/M2

## 2019-09-30 DIAGNOSIS — J32.9 RECURRENT SINUSITIS: ICD-10-CM

## 2019-09-30 DIAGNOSIS — E11.9 TYPE 2 DIABETES MELLITUS WITHOUT COMPLICATION, WITHOUT LONG-TERM CURRENT USE OF INSULIN (HCC): Primary | ICD-10-CM

## 2019-09-30 DIAGNOSIS — E78.2 MIXED HYPERLIPIDEMIA: ICD-10-CM

## 2019-09-30 DIAGNOSIS — Z12.5 SCREENING PSA (PROSTATE SPECIFIC ANTIGEN): ICD-10-CM

## 2019-09-30 DIAGNOSIS — I10 ESSENTIAL HYPERTENSION: ICD-10-CM

## 2019-09-30 LAB
A/G RATIO: 1.3 (ref 1.1–2.2)
ALBUMIN SERPL-MCNC: 4.1 G/DL (ref 3.4–5)
ALP BLD-CCNC: 69 U/L (ref 40–129)
ALT SERPL-CCNC: 19 U/L (ref 10–40)
ANION GAP SERPL CALCULATED.3IONS-SCNC: 15 MMOL/L (ref 3–16)
AST SERPL-CCNC: 14 U/L (ref 15–37)
BILIRUB SERPL-MCNC: 0.8 MG/DL (ref 0–1)
BUN BLDV-MCNC: 20 MG/DL (ref 7–20)
CALCIUM SERPL-MCNC: 9.4 MG/DL (ref 8.3–10.6)
CHLORIDE BLD-SCNC: 101 MMOL/L (ref 99–110)
CHOLESTEROL, TOTAL: 215 MG/DL (ref 0–199)
CO2: 23 MMOL/L (ref 21–32)
CREAT SERPL-MCNC: 1 MG/DL (ref 0.8–1.3)
GFR AFRICAN AMERICAN: >60
GFR NON-AFRICAN AMERICAN: >60
GLOBULIN: 3.1 G/DL
GLUCOSE BLD-MCNC: 124 MG/DL (ref 70–99)
HDLC SERPL-MCNC: 32 MG/DL (ref 40–60)
LDL CHOLESTEROL CALCULATED: 139 MG/DL
POTASSIUM SERPL-SCNC: 4.6 MMOL/L (ref 3.5–5.1)
PROSTATE SPECIFIC ANTIGEN: 0.71 NG/ML (ref 0–4)
SODIUM BLD-SCNC: 139 MMOL/L (ref 136–145)
TOTAL PROTEIN: 7.2 G/DL (ref 6.4–8.2)
TRIGL SERPL-MCNC: 220 MG/DL (ref 0–150)
VLDLC SERPL CALC-MCNC: 44 MG/DL

## 2019-09-30 PROCEDURE — 1123F ACP DISCUSS/DSCN MKR DOCD: CPT | Performed by: FAMILY MEDICINE

## 2019-09-30 PROCEDURE — 3017F COLORECTAL CA SCREEN DOC REV: CPT | Performed by: FAMILY MEDICINE

## 2019-09-30 PROCEDURE — 1036F TOBACCO NON-USER: CPT | Performed by: FAMILY MEDICINE

## 2019-09-30 PROCEDURE — G8417 CALC BMI ABV UP PARAM F/U: HCPCS | Performed by: FAMILY MEDICINE

## 2019-09-30 PROCEDURE — 4040F PNEUMOC VAC/ADMIN/RCVD: CPT | Performed by: FAMILY MEDICINE

## 2019-09-30 PROCEDURE — 3044F HG A1C LEVEL LT 7.0%: CPT | Performed by: FAMILY MEDICINE

## 2019-09-30 PROCEDURE — 36415 COLL VENOUS BLD VENIPUNCTURE: CPT | Performed by: FAMILY MEDICINE

## 2019-09-30 PROCEDURE — G8427 DOCREV CUR MEDS BY ELIG CLIN: HCPCS | Performed by: FAMILY MEDICINE

## 2019-09-30 PROCEDURE — 99214 OFFICE O/P EST MOD 30 MIN: CPT | Performed by: FAMILY MEDICINE

## 2019-09-30 PROCEDURE — 2022F DILAT RTA XM EVC RTNOPTHY: CPT | Performed by: FAMILY MEDICINE

## 2019-09-30 RX ORDER — AZITHROMYCIN 250 MG/1
TABLET, FILM COATED ORAL
Qty: 6 TABLET | Refills: 0 | Status: SHIPPED | OUTPATIENT
Start: 2019-09-30 | End: 2020-03-12

## 2019-09-30 ASSESSMENT — PATIENT HEALTH QUESTIONNAIRE - PHQ9
1. LITTLE INTEREST OR PLEASURE IN DOING THINGS: 0
SUM OF ALL RESPONSES TO PHQ QUESTIONS 1-9: 0
2. FEELING DOWN, DEPRESSED OR HOPELESS: 0
SUM OF ALL RESPONSES TO PHQ9 QUESTIONS 1 & 2: 0
SUM OF ALL RESPONSES TO PHQ QUESTIONS 1-9: 0

## 2019-09-30 NOTE — PATIENT INSTRUCTIONS
Please read the healthy family handout that you were given and share it with your family. Please compare this printed medication list with your medications at home to be sure they are the same. If you have any medications that are different please contact us immediately at [phone]. Also review your allergies that we have listed, these may also include medications that you have not been able to tolerate, make sure everything listed is correct. If you have any allergies that are different please contact us immediately at [phone].

## 2019-10-01 LAB
ESTIMATED AVERAGE GLUCOSE: 137 MG/DL
HBA1C MFR BLD: 6.4 %

## 2019-11-04 RX ORDER — LOSARTAN POTASSIUM 50 MG/1
TABLET ORAL
Qty: 90 TABLET | Refills: 1 | Status: SHIPPED | OUTPATIENT
Start: 2019-11-04 | End: 2020-05-05 | Stop reason: SDUPTHER

## 2020-01-28 RX ORDER — GABAPENTIN 100 MG/1
CAPSULE ORAL
Qty: 270 CAPSULE | Refills: 1 | Status: SHIPPED | OUTPATIENT
Start: 2020-01-28 | End: 2020-03-06

## 2020-02-18 ENCOUNTER — TELEPHONE (OUTPATIENT)
Dept: FAMILY MEDICINE CLINIC | Age: 70
End: 2020-02-18

## 2020-03-06 RX ORDER — GABAPENTIN 100 MG/1
CAPSULE ORAL
Qty: 270 CAPSULE | Refills: 0 | Status: SHIPPED | OUTPATIENT
Start: 2020-03-06 | End: 2020-03-12

## 2020-03-12 ENCOUNTER — INITIAL CONSULT (OUTPATIENT)
Dept: GASTROENTEROLOGY | Age: 70
End: 2020-03-12
Payer: MEDICARE

## 2020-03-12 VITALS
WEIGHT: 242 LBS | BODY MASS INDEX: 30.09 KG/M2 | HEIGHT: 75 IN | SYSTOLIC BLOOD PRESSURE: 120 MMHG | DIASTOLIC BLOOD PRESSURE: 70 MMHG

## 2020-03-12 PROCEDURE — 3017F COLORECTAL CA SCREEN DOC REV: CPT | Performed by: INTERNAL MEDICINE

## 2020-03-12 PROCEDURE — 1123F ACP DISCUSS/DSCN MKR DOCD: CPT | Performed by: INTERNAL MEDICINE

## 2020-03-12 PROCEDURE — 99204 OFFICE O/P NEW MOD 45 MIN: CPT | Performed by: INTERNAL MEDICINE

## 2020-03-12 PROCEDURE — G8417 CALC BMI ABV UP PARAM F/U: HCPCS | Performed by: INTERNAL MEDICINE

## 2020-03-12 PROCEDURE — G8427 DOCREV CUR MEDS BY ELIG CLIN: HCPCS | Performed by: INTERNAL MEDICINE

## 2020-03-12 PROCEDURE — 1036F TOBACCO NON-USER: CPT | Performed by: INTERNAL MEDICINE

## 2020-03-12 PROCEDURE — G8484 FLU IMMUNIZE NO ADMIN: HCPCS | Performed by: INTERNAL MEDICINE

## 2020-03-12 PROCEDURE — 4040F PNEUMOC VAC/ADMIN/RCVD: CPT | Performed by: INTERNAL MEDICINE

## 2020-03-12 RX ORDER — POLYETHYLENE GLYCOL 3350 17 G/17G
238 POWDER ORAL DAILY
Qty: 255 G | Refills: 0 | Status: SHIPPED | OUTPATIENT
Start: 2020-03-12 | End: 2021-09-21

## 2020-03-12 NOTE — PATIENT INSTRUCTIONS
[place] [id number] [facility] ,  [address] [facility]  Phone: [phone]  [facility],  [address] [place] [address], [address] [place] [address]  Phone: [phone]    Sedation  Three types of sedation are used for endoscopy and colonoscopy. The standard and most common is called conscious sedation. This is administered by the gastroenterologist and is part of the standard procedure. Common medications used for this are IV forms of a benzodiazepine (most commonly Versed) and a narcotic (most commonly fentanyl). Benadryl and nausea medicines may also be used. The effect of this is to make you comfortable. Most people will actually have amnesia with this and not recall the procedure. Some individuals will have other types of anesthesia provided by an anesthesiologist or nurse anesthetist.  The reason for this is a history of poor sedation, medication use that makes one more resistant to conscious sedation, a medical condition for which conscious sedation is contraindicated or other medical unstable conditions. This usually involves a separate fee from anesthesia. The most common of these is propofol (diprivan sedation) which is a deeper sedative the conscious sedation. In some instances, general anesthesia with intubation (breathing tube) is required. If you need to cancel or reschedule, please do so at least 2 weeks before the procedure, so that we can be considerate to other patients who are waiting to be scheduled.     If you cancel or reschedule less than 7 days before your procedure, you will be placed on a lower priority list.    ENDOSCOPY OVERVIEW  An upper endoscopy, often referred to as endoscopy, EGD, or elfrbmfs-mpzxze-zvntfvlmgkhx, is a procedure that allows a physician to directly examine the upper part of the gastrointestinal (GI) tract, which includes the esophagus (swallowing tube), the stomach, and the duodenum (the first section of the small intestine)  The physician who performs the procedures, known as an endoscopist, has special training in using an endoscope to examine the upper GI system, looking for inflammation (redness, irritation), bleeding, ulcers, or tumors. REASONS FOR UPPER ENDOSCOPY  The most common reasons for upper endoscopy include:  Unexplained discomfort in the upper abdomen   GERD or gastroesophageal reflux disease, (often called heartburn)   Persistent nausea and vomiting   Upper GI bleeding (vomiting blood or blood found in the stool that originated from the upper part of the gastrointestinal tract). Bleeding can be treated during the endoscopy. Difficulty swallowing; food/liquids getting stuck in the esophagus during swallowing. This may be caused by a narrowing (stricture) or tumor. The stricture may be dilated with special balloons or dilation tubes during the endoscopy. Abnormal or unclear findings on an upper GI x-ray, CT scan or MRI. Removal of a foreign body (a swallowed object). To check healing or progress on previously found polyps (growths), tumors, or ulcers. ENDOSCOPY PREPARATION  You will be given specific instructions regarding how to prepare for the examination before the procedure. These instructions are designed to maximize your safety during and after the examination and to minimize possible complications. It is important to read the instructions ahead of time and follow them carefully. Do not hesitate to call the physician's office or the endoscopy unit if there are questions. Nothing to eat after midnight the day before the test. You may be asked not to eat or drink anything for up to eight hours before the test. It is important for your stomach to be empty to allow the endoscopist to visualize the entire area and to decrease the possibility of food or fluid being vomited into the lungs while under sedation (called aspiration).   You may be asked to adjust the dose of your medications or to stop specific medications (such as aspirin-like drugs) temporarily before the examination. You should discuss your medications with your physician before your appointment for the endoscopy. You should arrange for a friend or family member to escort you home after the examination. Although you will be awake by the time you are discharged, the medications used for sedation cause temporary changes in the reflexes and judgment and interfere with your ability to drive or make decisions (similar to the effects of alcohol). WHAT TO EXPECT DURING ENDOSCOPY  Prior to the endoscopy, the staff will review your medical and surgical history, including current medications. A physician will explain the procedure and ask you to sign a consent. Before signing the consent, you should understand all the benefits and risks of the procedure, and should have all of your questions answered. An intravenous line (a needle inserted into a vein in the hand or arm) will be started to deliver medications. You will be given a combination of a sedative (to help you relax), and a narcotic (to prevent discomfort). Although most patients are sedated for the examination, many tolerate the procedure well without any medication. Your vital signs (blood pressure, heart rate, and blood oxygen level) will be monitored before, during, and after the examination. The monitoring is not painful. Oxygen is often given during the procedure through a small tube that sits under the nose and is fitted around the ears. For safety reasons, dentures should be removed before the procedure. THE ENDOSCOPY PROCEDURE  The procedure typically takes between 10 and 20 minutes to complete. The endoscopy is performed while you lie on your left side. Sometimes the physician will give a medication to numb the throat (either a gargle or a spray). A plastic mouth guard is placed between the teeth to prevent damage to the teeth and scope.   The endoscope (also called a gastroscope) is a flexible tube that is about the size of a finger. The scope has a lens and a light source that allows the endoscopist to look into the scope to see the inner lining of the upper gastrointestinal tract, or to view it on a TV monitor. Most people have no difficulty swallowing the flexible gastroscope as a result of the sedating medications. Many people sleep during the test; others are very relaxed and generally not aware of the examination. An alternative procedure called transnasal endoscopy may be available in some facilities. This involves passing a very thin scope (about the size of a drinking straw) through the nose. You are not sedated but a medication is applied to the nose to prevent discomfort. A full examination can be performed with this instrument. The endoscopist may take tissue samples called biopsies (not painful), or perform specific treatments (such as dilation, removal of polyps, treatment of bleeding), depending upon what is found during the examination. Air is introduced through the scope to open the esophagus, stomach, and intestine, allowing the scope to be passed through these structures and improving the endoscopist's ability to see all of the structures. You may experience a mild discomfort as air is pushed into the intestinal tract. This is not harmful and belching may relieve the sensation. The endoscope does not interfere with breathing. Taking slow, deep breaths during the procedure may help you to relax. ENDOSCOPY RECOVERY  After the endoscopy, you will be observed for one to two hours while the sedative medication wears off. The medicines cause most people to temporarily feel tired or have difficulty concentrating and you should not drive or return to work after the procedure. The most common discomfort after the examination is a feeling of bloating as a result of the air introduced during the examination. This usually resolves quickly.  Some patients also have a mild sore throat. Most patients are able to eat shortly after the examination. ENDOSCOPY COMPLICATIONS  Upper endoscopy is a safe procedure and complications are uncommon. The following is a list of possible complications:  Aspiration (inhaling) of food or fluids into the lungs, the risk of which can be minimized by not eating or drinking for the recommended period of time before the examination. The endoscope can cause a tear or hole in the tissue being examined. This is a serious complication but fortunately occurs only rarely. Bleeding can occur from biopsies or the removal of polyps, although it is usually minimal and stops quickly on its own or can be easily controlled. Reactions to the sedative medications are possible; the endoscopy team (doctors and nurses) will ask about previous medication allergies or reactions and about health problems such as heart, lung, kidney, or liver disease. Providing this information to the team ensures a safer examination. The medications may produce irritation in the vein at the site of the intravenous line. If redness, swelling, or discomfort occurs, your should call your endoscopist or primary care provider, or the number given by the nurse at discharge. The following signs and symptoms should be reported immediately:  Severe abdominal pain (more than gas cramps)   A firm, distended abdomen   Vomiting   Any temperature elevation   Difficulty swallowing or severe throat pain   A crunching feeling under the skin of the neck    AFTER UPPER ENDOSCOPY  Most patients tolerate endoscopy very well and feel fine afterwards. Some fatigue is common after the examination, and you should plan to take it easy and relax the rest of the day. The endoscopist can describe the result of their examination before you leave the endoscopy unit. If biopsies have been taken or polyps removed, you should call for results within one to two weeks.     WHERE TO GET MORE INFORMATION  Your healthcare provider is the best source of information for questions and concerns related to your medical problem. The following organizations also provide reliable health information. [employer] (www.nlm.nih.gov/medlineplus/healthtopics. html)  The American Society of Gastrointestinal Endoscopy: (www.askasge. org)  [employer] of Diabetes and Digestive and Kidney Diseases (http://digestive. niddk.nih.gov/ddiseases/pubs/upperendoscopy/index. htm)  ______________________________________________________________________________________________________________________________________    COLONOSCOPY OVERVIEW  A colonoscopy is an exam of the lower part of the gastrointestinal tract, which is called the colon or large intestine (bowel). Colonoscopy is a safe procedure that provides information other tests may not be able to give. Patients who require colonoscopy often have questions and concerns about the procedure. Colonoscopy is performed by inserting a device called a colonoscope into the anus and advanced through the entire colon. The procedure generally takes between 20 minutes and one hour. Other tests that are sometimes used to screen for colon cancer, like virtual colonoscopy (also called CT colonography), are discussed separately. More detailed information about colonoscopy is available by subscription.     REASONS FOR COLONOSCOPY   The most common reasons for colonoscopy are to evaluate the following:        As a screening exam for colon cancer      Rectal bleeding      A change in bowel habits, like persistent diarrhea      Iron deficiency anemia (a decrease in blood count due to loss of iron)      A family history of colon cancer      As a follow-up test in people with colon polyps or colon cancer      Chronic, unexplained abdominal or rectal pain      An abnormal X-ray exam, like a barium enema or CT scan    COLONOSCOPY PREPARATION  Before colonoscopy, your colon must be completely clopidogrel/Plavix®. Transportation home - You will be given a sedative (a medicine to help you relax) during the colonoscopy, so you will need someone to take you home after your test. Although you will be awake by the time you go home, the sedative medicines cause changes in reflexes and judgment that can interfere with your ability to make decisions, similar to the effect of alcohol. WHAT TO EXPECT  Before the test, a doctor will review the test, including possible complications, and will ask you to sign a consent form. The nurse will start an IV line in your hand or arm. Your blood pressure and heart rate will be monitored during the test.    THE COLONOSCOPY PROCEDURE  You will be given fluid and medicines through an IV line. Many people sleep during the test, while others are very relaxed, comfortable, and generally not aware. The colonoscope is a flexible tube, approximately the size of the index finger. The scope pumps air into the colon to inflate it and allow the doctor to see the entire lining. You might feel bloating or gas cramps as the air opens the colon. Try not to be embarrassed about passing this gas, and let your doctor know if you are uncomfortable. During the procedure, the doctor might take a biopsy (small pieces of tissue) or remove polyps. Polyps are growths of tissue that can range in size from the tip of a pen to several inches. Most polyps are benign (not cancerous). However, some polyps can become cancerous if allowed to grow for a long time. Having a polyp removed does not hurt. RECOVERY FROM COLONOSCOPY  After the colonoscopy, you will be observed in a recovery area until the effects of the sedative medication wear off. The most common complaint after colonoscopy is a feeling of bloating and gas cramps. You may also feel groggy from the sedation medications. You should not return to work or drive that day.  Most people are able to eat normally after the test. Ask your doctor when it is safe to restart aspirin and other blood-thinning medications. COLONOSCOPY COMPLICATIONS  Colonoscopy is a safe procedure, and complications are rare but can occur:        Bleeding can occur from biopsies or the removal of polyps, but it is usually minimal and can be controlled. The colonoscope can cause a tear or hole (perforation) in the colon. This is a serious problem, but it does not happen commonly. It is possible to have side effects from the sedative medicines. Although colonoscopy is the best test to examine the colon, it is possible for even the most skilled doctors to miss or overlook an abnormal area in the colon. You should call your doctor immediately if you have any of the following:        Severe abdominal pain (not just gas cramps)      A firm, bloated abdomen      Vomiting      Fever      Rectal bleeding (greater than a few tablespoons)    AFTER COLONOSCOPY  Although many people worry about being uncomfortable during a colonoscopy, most people tolerate it very well and feel fine afterward. It is normal to feel tired afterward. Plan to take it easy and relax the rest of the day. Your doctor can describe the results of the colonoscopy as soon as it is over. If s/he took biopsies or polyps, you should call for results within one to two weeks. We will make every attempt to get you your results by phone, mychart or sometimes a follow up visit, however, It is your responsibility to obtain your test results. If you do not get your results within 2 weeks, please call the office. Not all test results are available during your visit. If your test results are not back during the visit and you are still having symptoms, make an appointment with your caregiver to find out the results and any next steps. Do not assume everything is normal if you have not heard from your caregiver or the medical facility. It is important for you to follow up on all of your test results. WHERE TO GET MORE INFORMATION  Your healthcare provider is the best source of information for questions and concerns related to your medical problem. This article will be updated as needed every four months on our Web site ([email or web address]). The following organizations also provide reliable health information. [employer] (www.nlm.nih.gov/medlineplus/colonoscopy.html)  [address] for Gastrointestinal Endoscopy  (www.asge.org/PatientInfoIndex. aspx?gt=648)

## 2020-03-12 NOTE — LETTER
increased bleeding.  That is usually the standard of care, as coagulation would/should be normalized at 48hrs. Every attempt should be made to maintain ASA 81mg per day throughout the heidy-operative period in patients with diagnosis of ASHD. These recommendations may need to be modified by the provider/ based on risk /benefit analysis of the procedure and the patients history. If anticoagulation can not be held because recent cardiac stent, elective endoscopic procedures should be delayed until they have received the minimum duration of recommended antiplatlet therapy and it can safely be held. Again if unsure, patient should discuss with prescribing physician/service. If anticoagulation can not be stopped, endoscopic procedures can still be performed either diagnostically at a somewhat higher risk. Understand that any therapeutic procedure where anything beyond looking is performed, carries higher risks. For this reason without overt bleeding other testing       such as cologuard may be more appropriate. High risk endoscopic procedures that require stopping antiplatelet and anticoagulation therapy include polypectomy, biliary or pancreatic sphincterotomy, pneumatic or bougie dilation, PEG placement, therapeutic balloon-assisted enteroscopy, EUS and FNA, tumor ablation by any technique,       cystogastrostomy,and treatment of varices. Order Specific Question:   Screening or Diagnostic? Answer:   Diagnostic    EGD     Scheduling Instructions:      Please provide prep of choice instructions and prescription. General guidelines for holding blood thinners/anticoagulants around endoscopic procedure are but patients are encouraged to check with their prescribing physician. The patient may hold Plavix, Effient, Brilinta 5 days prior to the procedure unless:       A drug eluting stent has been placed within past 12 months. A nondrug eluting stent has been placed within past 1 month. Coumadin may be held 4 days prior to the procedure unless:        Mechanical mitral valve replacement (requires heparin bridge while Coumadin held and is managed by pharmacy)      Pradaxa, Xarelto, Eliquis may be held 2-3 days prior to procedure. According to pharmacokinetics of the drug, package insert, cardiology practice patterns, and T1/2 of theses drugs (12 hrs), Eliquis and Xarelto are held 48hrs prior to any procedure, including major surgical procedures w/o       increased bleeding.  That is usually the standard of care, as coagulation would/should be normalized at 48hrs. Every attempt should be made to maintain ASA 81mg per day throughout the heidy-operative period in patients with diagnosis of ASHD. These recommendations may need to be modified by the provider/ based on risk /benefit analysis of the procedure and the patients history. If anticoagulation can not be held because recent cardiac stent, elective endoscopic procedures should be delayed until they have received the minimum duration of recommended antiplatlet therapy and it can safely be held. Again if unsure, patient should discuss with prescribing physician/service. If anticoagulation can not be stopped, endoscopic procedures can still be performed either diagnostically at a somewhat higher risk. Understand that any therapeutic procedure where anything beyond looking is performed, carries higher risks. For this reason without overt bleeding other testing       such as cologuard may be more appropriate.               High risk endoscopic procedures that require stopping antiplatelet and anticoagulation therapy include polypectomy, biliary or pancreatic sphincterotomy, pneumatic or bougie dilation, PEG placement, therapeutic balloon-assisted enteroscopy, EUS and FNA, tumor ablation by any technique,

## 2020-03-12 NOTE — PROGRESS NOTES
[address] ,  [address] [facility]  Phone: [phone]   [facility] COMPLAINT     Chief Complaint   Patient presents with   [address]     NP- rectal bleeding, stopped now, thinks it was just hemorrhoids       HPI     Thank you Silver Grewal MD for asking me to see Cedric Warner in consultation. He is a  [2] White [1] 78 y.o. . male seen independently who presents with the following GI complaints: Massiel Bejerseye Cedric Warner  Describes self limited rectal bleeding 2 weeks ago for 2 days then 1 episode a few day later. Describes some constipation. Had known hemorrhoids. No pertinent GI family history. Also has chronic heartburn on prilosec > 5 years with symptoms if he stops it. On asa for primary prevention. HPI elements: location, severity, timing, modifying factors, quality, duration, context and associated signs/symptoms. Last Encounter Reviewed:  Pertinent PMH, FH, SH is reviewed below. Last EGD: none  Last Colonoscopy: 4/6/2011    Review of available records reveals:   Wt Readings from Last 50 Encounters:   03/12/20 242 lb (109.8 kg)   09/30/19 233 lb (105.7 kg)   04/23/19 241 lb 3.2 oz (109.4 kg)   03/18/19 237 lb (107.5 kg)   09/17/18 234 lb 6.4 oz (106.3 kg)   05/01/18 241 lb 2 oz (109.4 kg)   03/12/18 249 lb 6 oz (113.1 kg)   01/06/18 240 lb (108.9 kg)   09/11/17 241 lb 4 oz (109.4 kg)   08/17/17 241 lb (109.3 kg)   04/12/17 240 lb 8 oz (109.1 kg)   11/17/16 244 lb (110.7 kg)   01/28/15 239 lb 6 oz (108.6 kg)   09/10/13 224 lb 2 oz (101.7 kg)   07/30/12 233 lb 4 oz (105.8 kg)   01/20/12 236 lb (107 kg)   10/21/11 231 lb 8 oz (105 kg)   03/15/11 229 lb 2 oz (103.9 kg)   09/20/10 222 lb 2 oz (100.8 kg)   03/05/10 235 lb 4 oz (106.7 kg)       No components found for: HGBA1C  BP Readings from Last 3 Encounters:   03/12/20 120/70   09/30/19 100/65   04/23/19 117/76     Health Maintenance   Topic Date Due    Hepatitis C screen 1950    Diabetic retinal exam  10/29/1959    Hepatitis B vaccine (1 of 3 - Risk 3-dose series) 10/29/1968    DTaP/Tdap/Td vaccine (1 - Tdap) 10/29/1968    Annual Wellness Visit (AWV)  05/29/2019    Flu vaccine (1) 09/01/2019    Pneumococcal 65+ years Vaccine (1 of 1 - PPSV23) 03/18/2020 (Originally 10/29/2014)    Diabetic foot exam  09/30/2020 (Originally [birth year]/1959)    Shingles Vaccine (1 of 2) 09/30/2020 (Originally 10/29/1999)    Lipid screen  09/30/2020    A1C test (Diabetic or Prediabetic)  10/17/2020    Potassium monitoring  10/17/2020    Creatinine monitoring  10/17/2020    Colon cancer screen colonoscopy  04/06/2021    Hepatitis A vaccine  Aged Out    Hib vaccine  Aged Out    Meningococcal (ACWY) vaccine  Aged Out       No components found for: [facility]     PAST MEDICAL HISTORY     Past Medical History:   Diagnosis Date    BPH mild    no prostate cancer    Bursitis of elbow chronic    Chronic eczema     rt. leg    CKD (chronic kidney disease)     Dr Gabino Garzon GERD (gastroesophageal reflux disease)     Gout     Heart palpitations     Dr María Elena Oden    HTN     Hyperglycemia 2011    Hyperlipidemia     Inguinal hernia, left 2011    Noted on CAT scan    Nephrolithiasis     Rosacea     Sinusitis recurrent     FAMILY HISTORY   No family history on file.   SOCIAL HISTORY     Social History     Socioeconomic History    Marital status:      Spouse name: Not on file    Number of children: Not on file    Years of education: Not on file    Highest education level: Not on file   Occupational History    Not on file   Social Needs    Financial resource strain: Not on file    Food insecurity     Worry: Not on file     Inability: Not on file    Transportation needs     Medical: Not on file     Non-medical: Not on file   Tobacco Use    Smoking status: Never Smoker    Smokeless tobacco: Never Used   Substance and Sexual Activity    Alcohol use: No    Drug use: No    Sexual activity: Not on file   Lifestyle    Physical activity     Days per week: Not on file     Minutes per session: Not on file    Stress: Not on file   Relationships    Social connections     Talks on phone: Not on file     Gets together: Not on file     Attends Voodoo service: Not on file     Active member of club or organization: Not on file     Attends meetings of clubs or organizations: Not on file     Relationship status: Not on file    Intimate partner violence     Fear of current or ex partner: Not on file     Emotionally abused: Not on file     Physically abused: Not on file     Forced sexual activity: Not on file   Other Topics Concern    Not on file   Social History Narrative    Not on file     SURGICAL HISTORY     Past Surgical History:   Procedure Laterality Date    COLONOSCOPY  4/6/11    LITHOTRIPSY  2007     CURRENT MEDICATIONS   (This list may include medications prescribed during this encounter as epic can not insert only the list prior to this encounter.)  Current Outpatient Rx   Medication Sig Dispense Refill    polyethylene glycol (MIRALAX) POWD powder Take 238 g by mouth daily Take as directed for colonoscopy 255 g 0    losartan (COZAAR) 50 MG tablet take 1 tablet by mouth once daily 90 tablet 1    montelukast (SINGULAIR) 10 MG tablet Take 1 tablet by mouth daily (Patient taking differently: Take 10 mg by mouth daily Indications: taking as needed ) 30 tablet 3    potassium citrate (UROCIT-K 10) 10 MEQ (1080 MG) extended release tablet Take 1 tablet by mouth      metoprolol succinate (TOPROL XL) 50 MG extended release tablet Take 0.5 tablets by mouth daily 1 tablet 0    MAGNESIUM PO Take by mouth      Cholecalciferol (VITAMIN D PO) Take  by mouth.  allopurinol (ZYLOPRIM) 100 MG tablet Take 100 mg by mouth daily.  FLAXSEED by Does not apply route.  FIBER PO Take  by mouth.  Omeprazole (PRILOSEC PO) Take  by mouth.  FISH OIL by Does not apply route.       aspirin 81 MG EC tablet Take 81 mg by mouth daily Taking every other day      GUAIFENESIN by Does not apply route. ALLERGIES     Allergies   Allergen Reactions    Ace Inhibitors     Cholestyramine Nausea Only    Hctz      Dizziness    Lipitor     Pravachol [Pravastatin Sodium] Other (See Comments)     Headaches Fluttering    Zetia [Ezetimibe]     Zocor [Simvastatin]      IMMUNIZATIONS     Immunization History   Administered Date(s) Administered    Td, unspecified formulation 01/01/2003     REVIEW OF SYSTEMS   See HPI for further details and pertinent postiives. Negative for the following:  Constitutional: Negative for weight change. Negative for appetite change and fatigue. HENT: Negative for nosebleeds, sore throat, mouth sores, and voice change. Respiratory: Negative for cough, choking and chest tightness. Cardiovascular: Negative for chest pain   Gastrointestinal: See HPI  Musculoskeletal: Negative for arthralgias. Skin: Negative for pallor. Neurological: Negative for weakness and light-headedness. Hematological: Negative for adenopathy. Does not bruise/bleed easily. Psychiatric/Behavioral: Negative for suicidal ideas. PHYSICAL EXAM   VITAL SIGNS: /70 (Site: Left Upper Arm)   Ht 6' 2.5\" (1.892 m)   Wt 242 lb (109.8 kg)   BMI 30.66 kg/m²   Wt Readings from Last 3 Encounters:   03/12/20 242 lb (109.8 kg)   09/30/19 233 lb (105.7 kg)   04/23/19 241 lb 3.2 oz (109.4 kg)     Constitutional: Well developed, Well nourished, No acute distress, Non-toxic appearance. HENT: Normocephalic, Atraumatic, Bilateral external ears normal, Oropharynx moist, No oral exudates, Nose normal.   Eyes: Conjunctiva normal, No discharge. Neck: Normal range of motion, No tenderness, Supple, No stridor. Lymphatic: No cervical, subclavian, or axillary lymphadenopathy. Cardiovascular: Normal heart rate, Normal rhythm, No murmurs, No rubs, No gallops.    Thorax & Lungs: Normal breath sounds, No risk /benefit analysis of the procedure and the patients history. If anticoagulation can not be held because recent cardiac stent, elective endoscopic procedures should be delayed until they have received the minimum duration of recommended antiplatlet therapy and it can safely be held. Again if unsure, patient should discuss with prescribing physician/service. If anticoagulation can not be stopped, endoscopic procedures can still be performed either diagnostically at a somewhat higher risk. Understand that any therapeutic procedure where anything beyond looking is performed, carries higher risks. For this reason without overt bleeding other testing       such as cologuard may be more appropriate. High risk endoscopic procedures that require stopping antiplatelet and anticoagulation therapy include polypectomy, biliary or pancreatic sphincterotomy, pneumatic or bougie dilation, PEG placement, therapeutic balloon-assisted enteroscopy, EUS and FNA, tumor ablation by any technique,       cystogastrostomy,and treatment of varices. Order Specific Question:   Screening or Diagnostic? Answer:   Diagnostic    EGD     Scheduling Instructions:      Please provide prep of choice instructions and prescription. General guidelines for holding blood thinners/anticoagulants around endoscopic procedure are but patients are encouraged to check with their prescribing physician. The patient may hold Plavix, Effient, Brilinta 5 days prior to the procedure unless:       A drug eluting stent has been placed within past 12 months. A nondrug eluting stent has been placed within past 1 month. Coumadin may be held 4 days prior to the procedure unless:        Mechanical mitral valve replacement (requires heparin bridge while Coumadin held and is managed by pharmacy)      Pradaxa, Xarelto, Eliquis may be held 2-3 days prior to procedure.   According to pharmacokinetics of the drug, package insert, cardiology practice patterns, and T1/2 of theses drugs (12 hrs), Eliquis and Xarelto are held 48hrs prior to any procedure, including major surgical procedures w/o       increased bleeding.  That is usually the standard of care, as coagulation would/should be normalized at 48hrs. Every attempt should be made to maintain ASA 81mg per day throughout the heidy-operative period in patients with diagnosis of ASHD. These recommendations may need to be modified by the provider/ based on risk /benefit analysis of the procedure and the patients history. If anticoagulation can not be held because recent cardiac stent, elective endoscopic procedures should be delayed until they have received the minimum duration of recommended antiplatlet therapy and it can safely be held. Again if unsure, patient should discuss with prescribing physician/service. If anticoagulation can not be stopped, endoscopic procedures can still be performed either diagnostically at a somewhat higher risk. Understand that any therapeutic procedure where anything beyond looking is performed, carries higher risks. For this reason without overt bleeding other testing       such as cologuard may be more appropriate. High risk endoscopic procedures that require stopping antiplatelet and anticoagulation therapy include polypectomy, biliary or pancreatic sphincterotomy, pneumatic or bougie dilation, PEG placement, therapeutic balloon-assisted enteroscopy, EUS and FNA, tumor ablation by any technique,       cystogastrostomy,and treatment of varices. Order Specific Question:   Screening or Diagnostic? Answer:   Wilfredo Perez was seen today for establish care. Diagnoses and all orders for this visit:    Heartburn  -     EGD    Screening for colon cancer  -     COLONOSCOPY W/ OR W/O BIOPSY  -     bisacodyl (DULCOLAX) 5 MG EC tablet;  Take 4 tablets by mouth once for 1 dose Take as directed for colonoscopy. -     polyethylene glycol (MIRALAX) POWD powder; Take 238 g by mouth daily Take as directed for colonoscopy    Colon cancer is the second most common cancer in non-smokers. It is preventable. While family history plays a role, most remain sporadic. The gold standard for those willing to have it is colonoscopy that is not just a screening test but allows actual removal of precancerous (adenomatous) polyps and thus prevents their progression to cancer. 30-40% of people will have polyps on first colonoscopy. This has historically been recommended starting at the age of 48 in all Americans, 39 in  Americans, or 10 years younger then the diagnosis of adenomatous colon polyps or cancer in a first degree relative or 36, whichever comes sooner. Those with a family member diagnosed at an advanced age > 61, should have their first colonoscopy at 36. If normal and only one affected family member, colonoscopy should be offered every 10 years. If more then one affected family member, colonoscopy should be offered every 5 years even in the setting of a normal colonoscopy. In 2018 the ACS recommended beginning screening at the age of 39. This was based on an increase of 2% incidence in 39-53 year olds. 10% of CRC occurs in those < 50, 75% of those are 45-49.  15% of rectal cancers occur < 50 with half 45-49. Outside of screening, all symptomatic 39-53 year olds and those with lenard should be evaluated. Gastroesophageal reflux disease (GERD) can be distinguished from non-erosive reflux disease (NERD) by early EGD prior to treatment. GERD is a chronic lifelong condition for which lifelong medication is often necessary unless a correctable cause such as dietary non-compliance or often after significant weight loss. This is no different then any other treatment of any chronic condition like diabetes, htn, or hypercholesterolemia.   While PPIs as a class were discussed. Follow up should be annually unless warning symptoms or you take over prescribing chronic antisecretory therapy. EGD is recommended for refractory symptoms, dysphagia, family history of kruse's/esophageal cancer, new symptoms after the age of 53-62 in white men, anemia, weight loss or after 5 years controlled symptoms on antisecretory therapy to rule out Kruse's which may be present in up to 20%. Most cases of patients diagnosed with esophageal cancer present with dysphagia with very few actually diagnosed with Kruse's indicating a large amount of people likely go unscreened. If Kruse's not identified in high risk population, then will not be opportunity to intervene. Discussed alternatives to antisecretory therapy including hiatal hernia repair if present, Nissen fundoplication or implantation of the LINX magnetic ring at the GE junction. Some anti-secretory drugs are stronger then others. According to a study published in Gastroenterology. 2019;157(3):682-691.e2, long-term adverse effects (AE) were similar in patients receiving pantoprazole compared with those receiving placebo. In this 3x2 partial factorial, multicenter, double-blind, randomized, placebo-controlled trial, outcomes of participants with stable cardiovascular and peripheral artery disease who were not already taking a ppi at baseline were compared. Participants were randomly assigned to protonix 40mg/d AND rivaroxaban 2.5mg/d with asa 100mg/d, rivaroxaban 5mg bid, or asa 100mg qd. Both CV events (Mi, stroke, death) and non-CV events of interest, including pneumonia, fracture, new diabetes diagnosis, chronic kidney disease, dementia, COPD, and gastric atrophy were no different in the groups after 3 years. There was an increase risk of enteric infection. The conclusion was the benefits are likely to outweight the risks for these medication when clinically indicated.     With refractory symptoms unresponsive to anti-secretory medicine, endoscopy if not already done, pH and motility testing are recommended. ORDERED FUTURE/PENDING TESTS     Lab Frequency Next Occurrence       FOLLOWUP   Return for EGD & Colonoscopy.           Pj MORTON 3/12/20 2:13 PM EDT    CC:  Marcella Brennan MD

## 2020-03-18 ENCOUNTER — TELEPHONE (OUTPATIENT)
Dept: GASTROENTEROLOGY | Age: 70
End: 2020-03-18

## 2020-05-01 RX ORDER — GABAPENTIN 100 MG/1
CAPSULE ORAL
Qty: 270 CAPSULE | Refills: 0 | Status: SHIPPED | OUTPATIENT
Start: 2020-05-01 | End: 2020-06-12

## 2020-05-05 RX ORDER — LOSARTAN POTASSIUM 50 MG/1
TABLET ORAL
Qty: 30 TABLET | Refills: 1 | Status: SHIPPED | OUTPATIENT
Start: 2020-05-05 | End: 2020-07-02

## 2020-05-05 RX ORDER — LOSARTAN POTASSIUM 50 MG/1
TABLET ORAL
Qty: 90 TABLET | Refills: 1 | OUTPATIENT
Start: 2020-05-05

## 2020-05-27 ENCOUNTER — TELEPHONE (OUTPATIENT)
Dept: GASTROENTEROLOGY | Age: 70
End: 2020-05-27

## 2020-05-27 NOTE — TELEPHONE ENCOUNTER
Patient cancelling colon/egd scheduled for 6/2/20. Patient states he is no longer having the issues that he was when this was scheduled. Patient states he will CB to schedule in the possible future. Endo informed. [place] updated.

## 2020-06-11 NOTE — TELEPHONE ENCOUNTER
Date of last refill of this med was 5-1-20, # of pills given 270 and # of refills given 0. Their next appointment is 9-18-20, the last date patient was seen was 9-30-19. Does patient have medication agreement on file? No  Has drug screen been done in last 12 months if needed?  no

## 2020-06-12 RX ORDER — GABAPENTIN 100 MG/1
CAPSULE ORAL
Qty: 270 CAPSULE | Refills: 0 | Status: SHIPPED | OUTPATIENT
Start: 2020-06-12 | End: 2020-08-14

## 2020-07-02 RX ORDER — LOSARTAN POTASSIUM 50 MG/1
TABLET ORAL
Qty: 30 TABLET | Refills: 2 | Status: SHIPPED | OUTPATIENT
Start: 2020-07-02 | End: 2020-10-02

## 2020-08-14 RX ORDER — GABAPENTIN 100 MG/1
CAPSULE ORAL
Qty: 270 CAPSULE | Refills: 0 | Status: SHIPPED | OUTPATIENT
Start: 2020-08-14 | End: 2020-09-23

## 2020-08-14 NOTE — TELEPHONE ENCOUNTER
Date of last refill of this med was 06/12/2020, # of pills given 270 and # of refills given 0. Their next appointment is 09/18/2020, the last date patient was seen was 09/30/2019. Does patient have medication agreement on file? No  Has drug screen been done in last 12 months if needed?  no

## 2020-09-22 NOTE — TELEPHONE ENCOUNTER
Date of last refill of this med was 8/14, # of pills given 270 and # of refills given 0. Their next appointment is 9/28, the last date patient was seen was 9/30/19. Does patient have medication agreement on file? No  Has drug screen been done in last 12 months if needed?  no

## 2020-09-23 RX ORDER — GABAPENTIN 100 MG/1
CAPSULE ORAL
Qty: 270 CAPSULE | Refills: 0 | Status: SHIPPED | OUTPATIENT
Start: 2020-09-23 | End: 2020-10-23

## 2020-10-02 RX ORDER — LOSARTAN POTASSIUM 50 MG/1
TABLET ORAL
Qty: 30 TABLET | Refills: 0 | Status: SHIPPED | OUTPATIENT
Start: 2020-10-02 | End: 2020-10-28

## 2020-10-02 NOTE — TELEPHONE ENCOUNTER
Future appt Visit date not found Patient is going to have knee surg and will be calling for a preop in the next 2 weeks.   Last appt 9-30-19

## 2020-10-15 ENCOUNTER — TELEPHONE (OUTPATIENT)
Dept: FAMILY MEDICINE CLINIC | Age: 70
End: 2020-10-15

## 2020-10-16 RX ORDER — AZITHROMYCIN 250 MG/1
250 TABLET, FILM COATED ORAL SEE ADMIN INSTRUCTIONS
Qty: 6 TABLET | Refills: 0 | Status: SHIPPED | OUTPATIENT
Start: 2020-10-16 | End: 2020-10-21

## 2020-10-22 NOTE — TELEPHONE ENCOUNTER
Date of last refill of this med was 9/23, # of pills given 270 and # of refills given 0. Their next appointment is 11/9, the last date patient was seen was 9/30/19. Does patient have medication agreement on file? No  Has drug screen been done in last 12 months if needed?  no

## 2020-10-23 RX ORDER — GABAPENTIN 100 MG/1
CAPSULE ORAL
Qty: 270 CAPSULE | Refills: 0 | Status: SHIPPED | OUTPATIENT
Start: 2020-10-23 | End: 2020-11-19

## 2020-10-28 RX ORDER — LOSARTAN POTASSIUM 50 MG/1
TABLET ORAL
Qty: 30 TABLET | Refills: 0 | Status: SHIPPED | OUTPATIENT
Start: 2020-10-28 | End: 2020-11-19

## 2020-10-28 NOTE — TELEPHONE ENCOUNTER
Future appt scheduled 11/9/20  Last appt 9/30/19  Refilled medication per verbal order from provider.

## 2020-11-09 ENCOUNTER — OFFICE VISIT (OUTPATIENT)
Dept: FAMILY MEDICINE CLINIC | Age: 70
End: 2020-11-09
Payer: MEDICARE

## 2020-11-09 VITALS
WEIGHT: 227 LBS | OXYGEN SATURATION: 75 % | HEART RATE: 75 BPM | BODY MASS INDEX: 28.76 KG/M2 | DIASTOLIC BLOOD PRESSURE: 73 MMHG | TEMPERATURE: 97.5 F | SYSTOLIC BLOOD PRESSURE: 113 MMHG

## 2020-11-09 PROBLEM — I25.10 CAD S/P PERCUTANEOUS CORONARY ANGIOPLASTY: Status: ACTIVE | Noted: 2020-11-09

## 2020-11-09 PROBLEM — E11.42 TYPE 2 DIABETES MELLITUS WITH DIABETIC POLYNEUROPATHY, WITHOUT LONG-TERM CURRENT USE OF INSULIN (HCC): Status: ACTIVE | Noted: 2018-04-17

## 2020-11-09 PROBLEM — Z98.61 CAD S/P PERCUTANEOUS CORONARY ANGIOPLASTY: Status: ACTIVE | Noted: 2020-11-09

## 2020-11-09 LAB — HBA1C MFR BLD: 6.2 %

## 2020-11-09 PROCEDURE — 83036 HEMOGLOBIN GLYCOSYLATED A1C: CPT | Performed by: FAMILY MEDICINE

## 2020-11-09 PROCEDURE — 3044F HG A1C LEVEL LT 7.0%: CPT | Performed by: FAMILY MEDICINE

## 2020-11-09 PROCEDURE — G8482 FLU IMMUNIZE ORDER/ADMIN: HCPCS | Performed by: FAMILY MEDICINE

## 2020-11-09 PROCEDURE — 3017F COLORECTAL CA SCREEN DOC REV: CPT | Performed by: FAMILY MEDICINE

## 2020-11-09 PROCEDURE — 99214 OFFICE O/P EST MOD 30 MIN: CPT | Performed by: FAMILY MEDICINE

## 2020-11-09 PROCEDURE — 2022F DILAT RTA XM EVC RTNOPTHY: CPT | Performed by: FAMILY MEDICINE

## 2020-11-09 PROCEDURE — 4040F PNEUMOC VAC/ADMIN/RCVD: CPT | Performed by: FAMILY MEDICINE

## 2020-11-09 PROCEDURE — G0008 ADMIN INFLUENZA VIRUS VAC: HCPCS | Performed by: FAMILY MEDICINE

## 2020-11-09 PROCEDURE — 1036F TOBACCO NON-USER: CPT | Performed by: FAMILY MEDICINE

## 2020-11-09 PROCEDURE — G8427 DOCREV CUR MEDS BY ELIG CLIN: HCPCS | Performed by: FAMILY MEDICINE

## 2020-11-09 PROCEDURE — 90688 IIV4 VACCINE SPLT 0.5 ML IM: CPT | Performed by: FAMILY MEDICINE

## 2020-11-09 PROCEDURE — 1123F ACP DISCUSS/DSCN MKR DOCD: CPT | Performed by: FAMILY MEDICINE

## 2020-11-09 PROCEDURE — G8417 CALC BMI ABV UP PARAM F/U: HCPCS | Performed by: FAMILY MEDICINE

## 2020-11-09 RX ORDER — CLOPIDOGREL BISULFATE 75 MG/1
75 TABLET ORAL DAILY
COMMUNITY
Start: 2020-10-09 | End: 2022-04-01 | Stop reason: ALTCHOICE

## 2020-11-09 ASSESSMENT — PATIENT HEALTH QUESTIONNAIRE - PHQ9
SUM OF ALL RESPONSES TO PHQ QUESTIONS 1-9: 0
SUM OF ALL RESPONSES TO PHQ QUESTIONS 1-9: 0
SUM OF ALL RESPONSES TO PHQ9 QUESTIONS 1 & 2: 0
2. FEELING DOWN, DEPRESSED OR HOPELESS: 0
1. LITTLE INTEREST OR PLEASURE IN DOING THINGS: 0
SUM OF ALL RESPONSES TO PHQ QUESTIONS 1-9: 0

## 2020-11-09 NOTE — PROGRESS NOTES
Subjective:   He presents for follow up of his hypertension heart disease diabetes and hyperlipidemia. He is status post elective cardiac catheterization involving his obtuse marginal and left PDA coming off the circumflex. The heart doctor switched him to Plavix which helps some of his side effects. He also was given Zetia which he has had intolerance to in the past and he is having some issues again with it. He last saw cardiology at the end of September and was told to come back in 3 months. His blood pressures have been controlled he is still on a diabetic diet. Blood sugars run from 100-1 40 in the morning when he checks them. He can have his knee surgery for at least 6 months after using Plavix. He call his cardiologist about the Zetia side effects. He is hesitant to get a flu shot he has never gotten 1 but he knows he is at higher risk. He also has never had pneumonia vaccine but he wants to wait and do that in 6 months      He is allergic to ace inhibitors; cholestyramine; hctz; lipitor; pravachol [pravastatin sodium]; zetia [ezetimibe]; and zocor [simvastatin]. He   reports that he has never smoked. He has never used smokeless tobacco. Review of systems negative for chest pain swelling shortness of breath wheezing abdominal pain rectal bleeding  Objective:   /73   Pulse 75   Temp 97.5 °F (36.4 °C) (Oral)   Wt 227 lb (103 kg)   SpO2 (!) 75%   BMI 28.76 kg/m²   BP Readings from Last 3 Encounters:   11/09/20 113/73   03/12/20 120/70   09/30/19 100/65     Physical Exam  Vitals signs reviewed. Constitutional:       Appearance: He is well-developed. He is not toxic-appearing. HENT:      Head: Normocephalic. Eyes:      General: No scleral icterus. Right eye: No discharge. Left eye: No discharge. Conjunctiva/sclera: Conjunctivae normal.   Neck:      Musculoskeletal: Neck supple. Thyroid: No thyroid mass or thyromegaly. Vascular: No carotid bruit. Cardiovascular:      Rate and Rhythm: Normal rate and regular rhythm. Heart sounds: Normal heart sounds. No murmur. Pulmonary:      Effort: No respiratory distress. Breath sounds: Normal breath sounds. No wheezing or rales. Abdominal:      General: There is no distension. Palpations: Abdomen is soft. There is no mass. Tenderness: There is no abdominal tenderness. There is no guarding or rebound. Lymphadenopathy:      Cervical: No cervical adenopathy. Upper Body:      Right upper body: No supraclavicular adenopathy. Skin:     General: Skin is warm. Neurological:      Mental Status: He is alert and oriented to person, place, and time. Psychiatric:         Behavior: Behavior normal.         Thought Content: Thought content normal.         Judgment: Judgment normal.       Assessment and Plan:   Diagnosis Orders   1. CAD S/P percutaneous coronary angioplasty     2. Type 2 diabetes mellitus with diabetic polyneuropathy, without long-term current use of insulin (HCC)  POCT glycosylated hemoglobin (Hb A1C)   3. Essential hypertension     4. Mixed hyperlipidemia     5. Needs flu shot  INFLUENZA, QUADV, 3 YRS AND OLDER, IM, MDV, 0.5ML (Allison Holden)   Recently had lab work so will just get fingerstick hemoglobin A1c. Need to add magnesium to next blood work since he takes it over-the-counter as a supplement. A1c today 6.2% showing good control of his diabetes with diet. No indication for medication. We will call his cardiologist and let them know he did not tolerate the Zetia. The problems listed in the assessment are stable unless otherwise indicated. He  was instructed to continue their current medications and treatment for the aboveproblems unless otherwise indicated above. Age-specific preventative medicine recommendations were reviewed with patient today and the Healthy Family Handout was given to patient. Avoid tobacco products exposure. Follow up in 6mo.  Call or return

## 2020-11-19 RX ORDER — LOSARTAN POTASSIUM 50 MG/1
TABLET ORAL
Qty: 30 TABLET | Refills: 0 | Status: SHIPPED | OUTPATIENT
Start: 2020-11-19 | End: 2020-12-11

## 2020-11-19 RX ORDER — GABAPENTIN 100 MG/1
CAPSULE ORAL
Qty: 270 CAPSULE | Refills: 0 | Status: SHIPPED | OUTPATIENT
Start: 2020-11-19 | End: 2020-12-21

## 2020-11-19 NOTE — TELEPHONE ENCOUNTER
Date of last refill of this med was 10-23-20, # of pills given 270 and # of refills given 0. Their next appointment is not scheduled due in May, the last date patient was seen was 11-9-20. Does patient have medication agreement on file? No  Has drug screen been done in last 12 months if needed?  no

## 2020-12-11 RX ORDER — LOSARTAN POTASSIUM 50 MG/1
TABLET ORAL
Qty: 30 TABLET | Refills: 5 | Status: SHIPPED | OUTPATIENT
Start: 2020-12-11 | End: 2021-05-28

## 2020-12-21 RX ORDER — GABAPENTIN 100 MG/1
CAPSULE ORAL
Qty: 270 CAPSULE | Refills: 0 | Status: SHIPPED | OUTPATIENT
Start: 2020-12-21 | End: 2021-01-18

## 2020-12-21 NOTE — TELEPHONE ENCOUNTER
Date of last refill of this med was 11/19/2020, # of pills given 270 and # of refills given 0. Their next appointment is due 05/2021, the last date patient was seen was 11/09/2020. Does patient have medication agreement on file? Yes  Has drug screen been done in last 12 months if needed?  no

## 2020-12-29 RX ORDER — AZITHROMYCIN 250 MG/1
TABLET, FILM COATED ORAL
Qty: 6 TABLET | Refills: 0 | Status: SHIPPED | OUTPATIENT
Start: 2020-12-29 | End: 2021-09-21 | Stop reason: ALTCHOICE

## 2021-01-18 RX ORDER — GABAPENTIN 100 MG/1
CAPSULE ORAL
Qty: 270 CAPSULE | Refills: 0 | Status: SHIPPED | OUTPATIENT
Start: 2021-01-18 | End: 2021-01-28 | Stop reason: SDUPTHER

## 2021-01-18 NOTE — TELEPHONE ENCOUNTER
Date of last refill of this med was 12/21/2020, # of pills given 270 and # of refills given 0. Their next appointment is due 05/2021, the last date patient was seen was 11/09/2020. Does patient have medication agreement on file? No  Has drug screen been done in last 12 months if needed?  no

## 2021-01-25 ENCOUNTER — TELEPHONE (OUTPATIENT)
Dept: FAMILY MEDICINE CLINIC | Age: 71
End: 2021-01-25

## 2021-01-25 DIAGNOSIS — M54.41 CHRONIC MIDLINE LOW BACK PAIN WITH BILATERAL SCIATICA: Primary | ICD-10-CM

## 2021-01-25 DIAGNOSIS — G89.29 CHRONIC MIDLINE LOW BACK PAIN WITH BILATERAL SCIATICA: Primary | ICD-10-CM

## 2021-01-25 DIAGNOSIS — M54.42 CHRONIC MIDLINE LOW BACK PAIN WITH BILATERAL SCIATICA: Primary | ICD-10-CM

## 2021-01-25 NOTE — TELEPHONE ENCOUNTER
Patient wanted to see if you could prescribe something for his knee/neuropathy pain. He said he is not going to be able to have his surgery until fall due to him having the clots and being on blood thinner.   He said Advil does help but the urologist doesn't really want him taking this

## 2021-01-25 NOTE — TELEPHONE ENCOUNTER
Patient is still taking it, he said he is currently taking 4 tablets twice a day but said he is willing to try and increase it but also wanted to see if you could do something for the actual knee pain too such as tramadol or something

## 2021-01-26 NOTE — TELEPHONE ENCOUNTER
I pended script for Tramadol if you could sign it. Also he wanted to know how much of the Gabapentin he could take  He is currently taking 4 tabs bid can he increase that to 5 bid?

## 2021-01-26 NOTE — TELEPHONE ENCOUNTER
We can change him to 300mg tablets TID for better control if he wants instead of multiple 100mg pills

## 2021-01-27 NOTE — TELEPHONE ENCOUNTER
Patient said he would just prefer to do the 100 mg twice a day because he forgets to take them in the middle of the day so is it ok for him to take either 5 in the am and 5 in the pm?   Also can you send script for the tramadol that's pended

## 2021-01-28 RX ORDER — TRAMADOL HYDROCHLORIDE 50 MG/1
50-100 TABLET ORAL 3 TIMES DAILY PRN
Qty: 100 TABLET | Refills: 2 | Status: SHIPPED | OUTPATIENT
Start: 2021-01-28 | End: 2021-07-23

## 2021-01-28 RX ORDER — GABAPENTIN 100 MG/1
100-600 CAPSULE ORAL 2 TIMES DAILY
Qty: 1 CAPSULE | Refills: 0
Start: 2021-01-28 | End: 2021-02-18

## 2021-01-28 NOTE — TELEPHONE ENCOUNTER
Patient notified, he still has some of the Gabapentin so will take what he has for now and call when he needs refill

## 2021-01-28 NOTE — TELEPHONE ENCOUNTER
They have 600mg tablets so can just take 1 twice a day if he wants so doesn't have to take so many pills at a time

## 2021-02-09 ENCOUNTER — TELEPHONE (OUTPATIENT)
Dept: FAMILY MEDICINE CLINIC | Age: 71
End: 2021-02-09

## 2021-02-09 RX ORDER — ACYCLOVIR 400 MG/1
800 TABLET ORAL
Qty: 70 TABLET | Refills: 0 | Status: SHIPPED | OUTPATIENT
Start: 2021-02-09 | End: 2021-02-16

## 2021-02-09 NOTE — TELEPHONE ENCOUNTER
Patient calling he thinks he has shingles. He has a rash on his inner thigh that is painful.  He states he has had the pain for a couple of days, but the rash just started today He is asking if something can be called in

## 2021-02-17 ENCOUNTER — TELEPHONE (OUTPATIENT)
Dept: FAMILY MEDICINE CLINIC | Age: 71
End: 2021-02-17

## 2021-02-17 NOTE — TELEPHONE ENCOUNTER
Patient treated with acyclovir on 2/9 for shingles. He said he does not have any more blisters they are drying up but still having some pain and itching. 1) Wanted to know if he should have more medication or just give more time. 2) Also patient wanted to make sure ok to be around others since blisters are gone.  3) he is also scheduled to have covid vaccine tomorrow and wanted to make sure this was ok to have done with having shingles and also had stents done last year

## 2021-02-18 RX ORDER — GABAPENTIN 100 MG/1
CAPSULE ORAL
Qty: 270 CAPSULE | Refills: 1 | Status: SHIPPED | OUTPATIENT
Start: 2021-02-18 | End: 2021-04-29

## 2021-02-18 NOTE — TELEPHONE ENCOUNTER
Date of last refill of this med was 01/18/2021, # of pills given 270 and # of refills given 0. Their next appointment is due 05/2021, the last date patient was seen was 11/19/2020. Does patient have medication agreement on file? No  Has drug screen been done in last 12 months if needed?  no

## 2021-04-29 RX ORDER — GABAPENTIN 100 MG/1
CAPSULE ORAL
Qty: 270 CAPSULE | Refills: 1 | Status: SHIPPED | OUTPATIENT
Start: 2021-04-29 | End: 2021-06-16 | Stop reason: SDUPTHER

## 2021-04-29 NOTE — TELEPHONE ENCOUNTER
Date of last refill of this med was 02/18/2021, # of pills given 270 and # of refills given 1. Their next appointment is due 05/2021, the last date patient was seen was 11/09/2020. Does patient have medication agreement on file? No  Has drug screen been done in last 12 months if needed?  no

## 2021-05-28 RX ORDER — LOSARTAN POTASSIUM 50 MG/1
TABLET ORAL
Qty: 30 TABLET | Refills: 5 | Status: SHIPPED | OUTPATIENT
Start: 2021-05-28 | End: 2021-11-01 | Stop reason: DRUGHIGH

## 2021-05-28 NOTE — TELEPHONE ENCOUNTER
Future appt scheduled 0 appt scheduled             Last appt 11/09/2020      Last Written 12/11/2020    losartan (COZAAR) 50 MG tablet  #30  5 RF

## 2021-06-15 RX ORDER — GABAPENTIN 100 MG/1
CAPSULE ORAL
Qty: 270 CAPSULE | Refills: 1 | OUTPATIENT
Start: 2021-06-15 | End: 2021-07-15

## 2021-06-16 RX ORDER — GABAPENTIN 100 MG/1
CAPSULE ORAL
Qty: 270 CAPSULE | Refills: 0 | Status: SHIPPED | OUTPATIENT
Start: 2021-06-16 | End: 2021-08-16

## 2021-06-16 NOTE — TELEPHONE ENCOUNTER
Controlled Substance Monitoring:    Acute and Chronic Pain Monitoring:   RX Monitoring 6/16/2021   Periodic Controlled Substance Monitoring No signs of potential drug abuse or diversion identified.

## 2021-07-23 DIAGNOSIS — M54.41 CHRONIC MIDLINE LOW BACK PAIN WITH BILATERAL SCIATICA: ICD-10-CM

## 2021-07-23 DIAGNOSIS — M54.42 CHRONIC MIDLINE LOW BACK PAIN WITH BILATERAL SCIATICA: ICD-10-CM

## 2021-07-23 DIAGNOSIS — G89.29 CHRONIC MIDLINE LOW BACK PAIN WITH BILATERAL SCIATICA: ICD-10-CM

## 2021-07-23 RX ORDER — TRAMADOL HYDROCHLORIDE 50 MG/1
TABLET ORAL
Qty: 42 TABLET | Refills: 2 | Status: SHIPPED | OUTPATIENT
Start: 2021-07-23 | End: 2021-09-21 | Stop reason: SDUPTHER

## 2021-07-23 NOTE — TELEPHONE ENCOUNTER
Future appt scheduled 08/16/2021              Last appt 11/09/2020        Last Written  01/28/2021    traMADol (ULTRAM) 50 MG tablet  #100  2 RF

## 2021-07-26 ENCOUNTER — TELEPHONE (OUTPATIENT)
Dept: FAMILY MEDICINE CLINIC | Age: 71
End: 2021-07-26

## 2021-07-26 NOTE — LETTER
[address]  [address]  Phone: [phone]  Fax: [phone]    [place], [place]         July 27, 2021     Patient: Thi Lopez   YOB: 1950   Date of Visit: 7/26/2021       To Whom It May Concern: It is my medical opinion that Kirit Lee requires a disability parking placard for the following reasons:  He cannot walk 200 feet without stopping to rest.  Duration of need: permanent    If you have any questions or concerns, please don't hesitate to call.     Sincerely,        Sudeep Charlton MD

## 2021-07-26 NOTE — LETTER
[address] [address]  Phone: [phone]  Fax: [phone]         July 27, 2021     Patient: Nila Martinez   YOB: 1950   Date of Visit: 7/26/2021       To Whom It May Concern: It is my medical opinion that Jovon Lang requires a disability parking placard for the following reasons:  He cannot walk 200 feet without stopping to rest.  Duration of need: permanent    If you have any questions or concerns, please don't hesitate to call.     Sincerely,          Kiki Daugherty MD

## 2021-08-03 RX ORDER — GABAPENTIN 100 MG/1
CAPSULE ORAL
Qty: 270 CAPSULE | Refills: 0 | OUTPATIENT
Start: 2021-08-03

## 2021-08-16 RX ORDER — GABAPENTIN 300 MG/1
300 CAPSULE ORAL 3 TIMES DAILY
Qty: 270 CAPSULE | Refills: 1 | Status: SHIPPED | OUTPATIENT
Start: 2021-08-16 | End: 2022-04-01 | Stop reason: SDUPTHER

## 2021-08-16 NOTE — TELEPHONE ENCOUNTER
Date of last refill of this med was 6/16, # of pills given 270 and # of refills given 0. Their next appointment is 9/3, the last date patient was seen was 11/9. Does patient have medication agreement on file? No  Has drug screen been done in last 12 months if needed?  no

## 2021-09-21 ENCOUNTER — OFFICE VISIT (OUTPATIENT)
Dept: FAMILY MEDICINE CLINIC | Age: 71
End: 2021-09-21
Payer: MEDICARE

## 2021-09-21 VITALS
SYSTOLIC BLOOD PRESSURE: 104 MMHG | WEIGHT: 211 LBS | DIASTOLIC BLOOD PRESSURE: 57 MMHG | BODY MASS INDEX: 26.73 KG/M2 | HEART RATE: 75 BPM | OXYGEN SATURATION: 95 % | TEMPERATURE: 97.9 F

## 2021-09-21 DIAGNOSIS — J32.9 RECURRENT SINUSITIS: ICD-10-CM

## 2021-09-21 DIAGNOSIS — M17.12 LOCALIZED OSTEOARTHRITIS OF LEFT KNEE: ICD-10-CM

## 2021-09-21 DIAGNOSIS — E11.42 TYPE 2 DIABETES MELLITUS WITH DIABETIC POLYNEUROPATHY, WITHOUT LONG-TERM CURRENT USE OF INSULIN (HCC): Primary | ICD-10-CM

## 2021-09-21 DIAGNOSIS — M54.41 CHRONIC MIDLINE LOW BACK PAIN WITH BILATERAL SCIATICA: ICD-10-CM

## 2021-09-21 DIAGNOSIS — I25.10 CAD S/P PERCUTANEOUS CORONARY ANGIOPLASTY: ICD-10-CM

## 2021-09-21 DIAGNOSIS — I10 ESSENTIAL HYPERTENSION: ICD-10-CM

## 2021-09-21 DIAGNOSIS — Z98.61 CAD S/P PERCUTANEOUS CORONARY ANGIOPLASTY: ICD-10-CM

## 2021-09-21 DIAGNOSIS — M54.42 CHRONIC MIDLINE LOW BACK PAIN WITH BILATERAL SCIATICA: ICD-10-CM

## 2021-09-21 DIAGNOSIS — Z12.5 SCREENING PSA (PROSTATE SPECIFIC ANTIGEN): ICD-10-CM

## 2021-09-21 DIAGNOSIS — N18.31 STAGE 3A CHRONIC KIDNEY DISEASE (HCC): ICD-10-CM

## 2021-09-21 DIAGNOSIS — G89.29 CHRONIC MIDLINE LOW BACK PAIN WITH BILATERAL SCIATICA: ICD-10-CM

## 2021-09-21 PROBLEM — H02.403 PTOSIS OF BOTH EYELIDS: Status: ACTIVE | Noted: 2021-09-21

## 2021-09-21 PROBLEM — M17.11 LOCALIZED OSTEOARTHRITIS OF RIGHT KNEE: Status: ACTIVE | Noted: 2021-09-21

## 2021-09-21 PROBLEM — R80.9 MICROALBUMINURIA: Status: ACTIVE | Noted: 2021-09-21

## 2021-09-21 PROBLEM — H25.13 AGE-RELATED NUCLEAR CATARACT OF BOTH EYES: Status: ACTIVE | Noted: 2021-09-21

## 2021-09-21 LAB
BASOPHILS ABSOLUTE: 0.1 K/UL (ref 0–0.2)
BASOPHILS RELATIVE PERCENT: 0.7 %
EOSINOPHILS ABSOLUTE: 0.1 K/UL (ref 0–0.6)
EOSINOPHILS RELATIVE PERCENT: 0.9 %
HCT VFR BLD CALC: 41.8 % (ref 40.5–52.5)
HEMOGLOBIN: 14.2 G/DL (ref 13.5–17.5)
LYMPHOCYTES ABSOLUTE: 1.7 K/UL (ref 1–5.1)
LYMPHOCYTES RELATIVE PERCENT: 23.6 %
MCH RBC QN AUTO: 30.1 PG (ref 26–34)
MCHC RBC AUTO-ENTMCNC: 33.9 G/DL (ref 31–36)
MCV RBC AUTO: 88.8 FL (ref 80–100)
MONOCYTES ABSOLUTE: 0.4 K/UL (ref 0–1.3)
MONOCYTES RELATIVE PERCENT: 5.9 %
NEUTROPHILS ABSOLUTE: 4.9 K/UL (ref 1.7–7.7)
NEUTROPHILS RELATIVE PERCENT: 68.9 %
PDW BLD-RTO: 13.2 % (ref 12.4–15.4)
PLATELET # BLD: 236 K/UL (ref 135–450)
PMV BLD AUTO: 8.1 FL (ref 5–10.5)
RBC # BLD: 4.7 M/UL (ref 4.2–5.9)
WBC # BLD: 7.2 K/UL (ref 4–11)

## 2021-09-21 PROCEDURE — 3046F HEMOGLOBIN A1C LEVEL >9.0%: CPT | Performed by: FAMILY MEDICINE

## 2021-09-21 PROCEDURE — 1036F TOBACCO NON-USER: CPT | Performed by: FAMILY MEDICINE

## 2021-09-21 PROCEDURE — G8427 DOCREV CUR MEDS BY ELIG CLIN: HCPCS | Performed by: FAMILY MEDICINE

## 2021-09-21 PROCEDURE — 2022F DILAT RTA XM EVC RTNOPTHY: CPT | Performed by: FAMILY MEDICINE

## 2021-09-21 PROCEDURE — 36415 COLL VENOUS BLD VENIPUNCTURE: CPT | Performed by: FAMILY MEDICINE

## 2021-09-21 PROCEDURE — 99214 OFFICE O/P EST MOD 30 MIN: CPT | Performed by: FAMILY MEDICINE

## 2021-09-21 PROCEDURE — 3017F COLORECTAL CA SCREEN DOC REV: CPT | Performed by: FAMILY MEDICINE

## 2021-09-21 PROCEDURE — 4040F PNEUMOC VAC/ADMIN/RCVD: CPT | Performed by: FAMILY MEDICINE

## 2021-09-21 PROCEDURE — 1123F ACP DISCUSS/DSCN MKR DOCD: CPT | Performed by: FAMILY MEDICINE

## 2021-09-21 PROCEDURE — G8417 CALC BMI ABV UP PARAM F/U: HCPCS | Performed by: FAMILY MEDICINE

## 2021-09-21 PROCEDURE — 93000 ELECTROCARDIOGRAM COMPLETE: CPT | Performed by: FAMILY MEDICINE

## 2021-09-21 RX ORDER — TRAMADOL HYDROCHLORIDE 50 MG/1
50 TABLET ORAL EVERY 8 HOURS PRN
Qty: 90 TABLET | Refills: 2 | Status: SHIPPED | OUTPATIENT
Start: 2021-09-21 | End: 2022-01-25

## 2021-09-21 RX ORDER — EVOLOCUMAB 140 MG/ML
140 INJECTION, SOLUTION SUBCUTANEOUS
COMMUNITY
Start: 2021-06-17

## 2021-09-21 RX ORDER — AZITHROMYCIN 250 MG/1
250 TABLET, FILM COATED ORAL SEE ADMIN INSTRUCTIONS
Qty: 6 TABLET | Refills: 0 | Status: SHIPPED | OUTPATIENT
Start: 2021-09-21 | End: 2021-09-26

## 2021-09-21 ASSESSMENT — PATIENT HEALTH QUESTIONNAIRE - PHQ9
SUM OF ALL RESPONSES TO PHQ QUESTIONS 1-9: 0
1. LITTLE INTEREST OR PLEASURE IN DOING THINGS: 0
2. FEELING DOWN, DEPRESSED OR HOPELESS: 0
SUM OF ALL RESPONSES TO PHQ QUESTIONS 1-9: 0
SUM OF ALL RESPONSES TO PHQ9 QUESTIONS 1 & 2: 0
SUM OF ALL RESPONSES TO PHQ QUESTIONS 1-9: 0

## 2021-09-21 NOTE — PATIENT INSTRUCTIONS
Please read the healthy family handout that you were given and share it with your family. Please compare this printed medication list with your medications at home to be sure they are the same. If you have any medications that are different please contact us immediately at [phone]. Also review your allergies that we have listed, these may also include medications that you have not been able to tolerate, make sure everything listed is correct. If you have any allergies that are different please contact us immediately at [phone].

## 2021-09-21 NOTE — PROGRESS NOTES
2021    Amalia Keyes (:  1950) is a 69 y.o. male, here for follow-up of his chronic health problems and for a preoperative medicine evaluation. Patient saw his cardiologist not too long ago and I reviewed the note. He is doing well on the PCSK9 inhibitor therapy for his lipids since he did not tolerate statins or Zetia in the past.  His LDL when last checked 6 months ago was excellent. Patient is also scheduled for a total left knee replacement for longstanding severe osteoarthritis of the left knee with chronic pain. Patient continues to use tramadol sparingly for his knee and back pain. He will be able to discontinue Plavix at the end of September per cardiology. Patient reports orthopedic surgeon told him it was okay to continue low-dose aspirin. Patient Active Problem List   Diagnosis    GERD (gastroesophageal reflux disease)    Chronic eczema    Recurrent sinusitis    Tear of medial meniscus of knee    Gout    Benign non-nodular prostatic hyperplasia without lower urinary tract symptoms    CKD (chronic kidney disease)    Essential hypertension    Mixed hyperlipidemia    Idiopathic peripheral neuropathy    Chronic midline low back pain with bilateral sciatica    Type 2 diabetes mellitus with diabetic polyneuropathy, without long-term current use of insulin ([facility])    CAD S/P percutaneous coronary angioplasty    Microalbuminuria    Age-related nuclear cataract of both eyes    Ptosis of both eyelids    Localized osteoarthritis of right knee       Review of Systems   HENT: Positive for postnasal drip. Musculoskeletal: Positive for arthralgias. All other systems reviewed and are negative. Prior to Visit Medications    Medication Sig Taking?  Authorizing Provider   Evolocumab (REPATHA SURECLICK) 968 MG/ML SOAJ Inject 140 mg as directed every 14 days Yes Historical Provider, MD   azithromycin (ZITHROMAX) 250 MG tablet Take 1 tablet by mouth See Admin Instructions for 5 days 500mg on day 1 followed by 250mg on days 2 - 5 Yes Andre Ornelas MD   traMADol (ULTRAM) 50 MG tablet Take 1 tablet by mouth every 8 hours as needed for Pain for up to 30 days. Yes Andre Ornelas MD   gabapentin (NEURONTIN) 300 MG capsule Take 1 capsule by mouth 3 times daily for 180 days. Intended supply: 30 days Yes Andre Ornelas MD   losartan (COZAAR) 50 MG tablet take 1 tablet by mouth once daily Yes Andre Ornelas MD   clopidogrel (PLAVIX) 75 MG tablet Take 75 mg by mouth daily Yes Historical Provider, MD   montelukast (SINGULAIR) 10 MG tablet Take 1 tablet by mouth daily  Patient taking differently: Take 10 mg by mouth daily Indications: taking as needed  Yes MARCIO Castaneda CNP   potassium citrate (UROCIT-K 10) 10 MEQ (1080 MG) extended release tablet Take 1 tablet by mouth Yes Historical Provider, MD   metoprolol succinate (TOPROL XL) 50 MG extended release tablet Take 0.5 tablets by mouth daily Yes Andre Ornelas MD   MAGNESIUM PO Take by mouth Yes Historical Provider, MD   Cholecalciferol (VITAMIN D PO) Take  by mouth. Yes Historical Provider, MD   allopurinol (ZYLOPRIM) 100 MG tablet Take 100 mg by mouth daily. Yes Historical Provider, MD   FLAXSEED by Does not apply route. Yes Historical Provider, MD   FIBER PO Take  by mouth. Yes Historical Provider, MD   Omeprazole (PRILOSEC PO) Take  by mouth. Yes Historical Provider, MD [address] by Does not apply route. Yes Historical Provider, MD   aspirin 81 MG EC tablet Take 81 mg by mouth daily Taking every other day Yes Historical Provider, MD   GUAIFENESIN by Does not apply route.  Yes Historical Provider, MD        Allergies   Allergen Reactions    Ace Inhibitors     Cholestyramine Nausea Only    Hctz      Dizziness    Lipitor     Pravachol [Pravastatin Sodium] Other (See Comments)     Headaches Fluttering    Zetia [Ezetimibe]     Zocor [Simvastatin]        Past Medical History:   Diagnosis Date    BPH mild no prostate cancer    Bursitis of elbow chronic    Chronic eczema     rt. leg    CKD (chronic kidney disease)     Dr Varinder Buck Coronary artery disease involving native coronary artery of native heart without angina pectoris     GERD (gastroesophageal reflux disease)     Gout     Heart palpitations     Dr Ag Carter    HTN     Hyperglycemia 01/01/2011    Hyperlipidemia     Inguinal hernia, left 01/01/2011    Noted on CAT scan    Nephrolithiasis     Rosacea     Sinusitis recurrent    Type 2 diabetes mellitus, without long-term current use of insulin ([facility])        Past Surgical History:   Procedure Laterality Date    COLONOSCOPY  04/06/2011    CORONARY ANGIOPLASTY  2020    LITHOTRIPSY  01/01/2007       Social History     Socioeconomic History    Marital status:      Spouse name: Not on file    Number of children: Not on file    Years of education: Not on file    Highest education level: Not on file   Occupational History    Not on file   Tobacco Use    Smoking status: Never Smoker    Smokeless tobacco: Never Used   Substance and Sexual Activity    Alcohol use: No    Drug use: No    Sexual activity: Not on file     ADVANCE DIRECTIVE: N, <no information>    Vitals:    09/21/21 1037   BP: (!) 104/57   Pulse: 75   Temp: 97.9 °F (36.6 °C)   TempSrc: Oral   SpO2: 95%   Weight: 211 lb (95.7 kg)     Estimated body mass index is 26.73 kg/m² as calculated from the following:    Height as of 3/12/20: 6' 2.5\" (1.892 m). Weight as of this encounter: 211 lb (95.7 kg). Physical Exam  Vitals reviewed. Constitutional:       General: He is not in acute distress. Appearance: He is well-developed. He is not toxic-appearing. Eyes:      Conjunctiva/sclera: Conjunctivae normal.      Pupils: Pupils are equal, round, and reactive to light. Neck:      Thyroid: No thyromegaly. Vascular: No carotid bruit or JVD.       Trachea: Trachea normal.   Cardiovascular:      Rate and Rhythm: Normal rate and regular rhythm. Heart sounds: Normal heart sounds. Pulmonary:      Effort: Pulmonary effort is normal.      Breath sounds: Normal breath sounds. No wheezing or rales. Abdominal:      Palpations: Abdomen is soft. There is no hepatomegaly, splenomegaly or mass. Tenderness: There is no abdominal tenderness. There is no guarding. Musculoskeletal:      Cervical back: Neck supple. Lymphadenopathy:      Cervical: No cervical adenopathy. Skin:     General: Skin is warm and dry. Neurological:      Mental Status: He is alert and oriented to person, place, and time. Psychiatric:         Behavior: Behavior normal. Behavior is cooperative. EKG shows NSR today    Immunization History   Administered Date(s) Administered    COVID-19, Moderna, PF, 100mcg/0.5mL 02/24/2021, 03/25/2021    Influenza, Quadv, IM, (6 mo and older Fluzone, Flulaval, Fluarix and 3 yrs and older Afluria) 11/09/2020    Td, unspecified formulation 01/01/2003        ASSESSMENT/PLAN:  1. Type 2 diabetes mellitus with diabetic polyneuropathy, without long-term current use of insulin ([address]    2. Localized osteoarthritis of left knee    3. Chronic midline low back pain with bilateral sciatica    4. Essential hypertension    5. CAD S/P percutaneous coronary angioplasty    6. Stage 3a chronic kidney disease ([address]    7. Recurrent sinusitis    8. Screening PSA (prostate specific antigen)      Orders Placed This Encounter   Procedures    Comprehensive Metabolic Panel    CBC Auto Differential    Lipid Panel    PSA screening    Hemoglobin A1C    EKG 12 Lead     My opinion is that the patient is currently medically stable. He was advised to avoid NSAIDs, fish oil supplements, vitamin E supplements and blood thinners one week before planned procedure. This note will be sent to the surgeon. He will stop plavix at end of September. I refilled tramadol today. Milagro Mueller M.D.   [facility] [address], [address]             An electronic signature was used to authenticate this note.     --Yuri Higgins MD on 9/21/2021 at 11:35 AM

## 2021-09-22 LAB
A/G RATIO: 1.7 (ref 1.1–2.2)
ALBUMIN SERPL-MCNC: 4.5 G/DL (ref 3.4–5)
ALP BLD-CCNC: 69 U/L (ref 40–129)
ALT SERPL-CCNC: 20 U/L (ref 10–40)
ANION GAP SERPL CALCULATED.3IONS-SCNC: 12 MMOL/L (ref 3–16)
AST SERPL-CCNC: 16 U/L (ref 15–37)
BILIRUB SERPL-MCNC: 0.6 MG/DL (ref 0–1)
BUN BLDV-MCNC: 17 MG/DL (ref 7–20)
CALCIUM SERPL-MCNC: 9.8 MG/DL (ref 8.3–10.6)
CHLORIDE BLD-SCNC: 104 MMOL/L (ref 99–110)
CHOLESTEROL, TOTAL: 115 MG/DL (ref 0–199)
CO2: 25 MMOL/L (ref 21–32)
CREAT SERPL-MCNC: 0.9 MG/DL (ref 0.8–1.3)
ESTIMATED AVERAGE GLUCOSE: 131.2 MG/DL
GFR AFRICAN AMERICAN: >60
GFR NON-AFRICAN AMERICAN: >60
GLOBULIN: 2.6 G/DL
GLUCOSE BLD-MCNC: 129 MG/DL (ref 70–99)
HBA1C MFR BLD: 6.2 %
HDLC SERPL-MCNC: 43 MG/DL (ref 40–60)
LDL CHOLESTEROL CALCULATED: 35 MG/DL
POTASSIUM SERPL-SCNC: 4.8 MMOL/L (ref 3.5–5.1)
PROSTATE SPECIFIC ANTIGEN: 0.77 NG/ML (ref 0–4)
SODIUM BLD-SCNC: 141 MMOL/L (ref 136–145)
TOTAL PROTEIN: 7.1 G/DL (ref 6.4–8.2)
TRIGL SERPL-MCNC: 185 MG/DL (ref 0–150)
VLDLC SERPL CALC-MCNC: 37 MG/DL

## 2021-10-11 ENCOUNTER — HOSPITAL ENCOUNTER (OUTPATIENT)
Dept: PHYSICAL THERAPY | Age: 71
Setting detail: THERAPIES SERIES
Discharge: HOME | End: 2021-10-11
Payer: MEDICARE

## 2021-10-11 PROCEDURE — 97161 PT EVAL LOW COMPLEX 20 MIN: CPT

## 2021-10-11 PROCEDURE — 97110 THERAPEUTIC EXERCISES: CPT

## 2021-10-11 PROCEDURE — 97016 VASOPNEUMATIC DEVICE THERAPY: CPT

## 2021-10-11 PROCEDURE — 97140 MANUAL THERAPY 1/> REGIONS: CPT

## 2021-10-11 RX ORDER — METOPROLOL SUCCINATE 25 MG/1
TABLET, EXTENDED RELEASE ORAL
Qty: 90 TABLET | OUTPATIENT
Start: 2021-10-11

## 2021-10-11 NOTE — PLAN OF CARE
[facility] and [address] [place] [address] [facility] [place], [address] [place] [address]  Phone: [phone], [phone]                                                       Physical Therapy Certification    Dear Referring Practitioner: Yared Mcdonald,    We had the pleasure of evaluating the following patient for physical therapy services at [address] [place] [address]. A summary of our findings can be found in the initial assessment below. This includes our plan of care. If you have any questions or concerns regarding these findings, please do not hesitate to contact me at the office phone number checked above. Thank you for the referral.       Physician Signature:_______________________________Date:__________________  By signing above (or electronic signature), therapists plan is approved by physician    Patient: Mark Sarabia   : 1950   MRN: [id number]  Referring Physician: Referring Practitioner: Yared Mcdonald      Evaluation Date: 10/11/2021      Medical Diagnosis Information:  Diagnosis: Left Knee OA s/p Left TKA 10/6/2021   Treatment Diagnosis: M17.12                                         Insurance information: PT Insurance Information: Medicare     Precautions/ Contra-indications/Relevant Medical History: Hx HTN, Hyperlipidemia; Hx stent placment     C-SSRS Triggered by Intake questionnaire (Past 2 wk assessment):   [x] No, Questionnaire did not trigger screening.   [] Yes, Patient intake triggered further evaluation      [] C-SSRS Screening completed  [] PCP notified via Plan of Care  [] Emergency services notified     Latex Allergy:  [x]NO      []YES  Preferred Language for Healthcare:   [x]English       []other:    SUBJECTIVE: Patient stated complaint: Patient is a 68 y/o male who presents s/p Left TKA on 10/11/2021. Patient states that he had no particular injury that lead to surgery just over time arthritis has progressed. Functional Disability Index:LEFS: 75% (Score: 20/80)    Pain Scale: 3-4/10 at rest; 10/10 at worst  Easing factors: rest   Provocative factors: walking, bending the knee     Type: [x]Constant   []Intermittent  []Radiating []Localized []other:     Numbness/Tingling: Patient reports neuropathy in B feet. Occupation/School: Auctioneer    Living Status/Prior Level of Function: Independent with ADLs and IADLs     OBJECTIVE:     ROM LEFT RIGHT   HIP Flex     HIP Abd     HIP Ext     HIP IR     HIP ER     Knee ext AAROM Lacking 11 °   PROM Lacking 6 °  0 °    Knee Flex AAROM 94 °   PROM 100 °  130 °    Ankle PF     Ankle DF     Ankle In     Ankle Ev     Strength  LEFT RIGHT   HIP Flexors 3-/5 4+/5   HIP Abductors \" \"   HIP Ext \" \"   Hip ER \" \"   Knee EXT (quad) 2/5 5/5   Knee Flex (HS) \" \"   Ankle DF     Ankle PF     Ankle Inv     Ankle EV          Balance (up to 10 sec) LEFT RIGHT   Feet Together     Off Set Stance     Tandem Stance     Single Limb          Circumference   17 3/4\"          Reflexes/Sensation:    [x]Dermatomes/Myotomes intact    []Reflexes equal and normal bilaterally   []Other:    Joint mobility:    []Normal    [x]Hypo   []Hyper    Palpation: Noted warmth throughout    Functional Mobility/Transfers: slowed mobility with use of RW and use of UE support    Posture: decreased TKE in standing     Bandages/Dressings/Incisions: surgical incision covered with bandage    Gait: (include devices/WB status) Use of AD with decreased heel strike, stride length, and overall step length    Orthopedic Special Tests: n/a post-op                        [x] Patient history, allergies, meds reviewed. Medical chart reviewed. See intake form. Review Of Systems (ROS):  [x]Performed Review of systems (Integumentary, CardioPulmonary, Neurological) by intake and observation. Intake form has been scanned into medical record.  Patient has been instructed to contact their primary care physician regarding ROS issues if not already being addressed at this time. Co-morbidities/Complexities (which will affect course of rehabilitation):   []None           Arthritic conditions   []Rheumatoid arthritis (M05.9)  [x]Osteoarthritis (M19.91)   Cardiovascular conditions   [x]Hypertension (I10)  [x]Hyperlipidemia (E78.5)  []Angina pectoris (I20)  []Atherosclerosis (I70)   Musculoskeletal conditions   []Disc pathology   []Congenital spine pathologies   [x]Prior surgical intervention (Hx of Meniscus Repair L knee)  []Osteoporosis (M81.8)  []Osteopenia (M85.8)   Endocrine conditions   []Hypothyroid (E03.9)  []Hyperthyroid Gastrointestinal conditions   []Constipation (O01.32)   Metabolic conditions   []Morbid obesity (E66.01)  []Diabetes type 1(E10.65) or 2 (E11.65)   []Neuropathy (G60.9)     Pulmonary conditions   []Asthma (J45)  []Coughing   []COPD (J44.9)   Psychological Disorders  []Anxiety (F41.9)  []Depression (F32.9)   []Other:   [x]Other:   Hx of Stents        Barriers to/and or personal factors that will affect rehab potential:              []Age  []Sex              []Motivation/Lack of Motivation                        []Co-Morbidities              []Cognitive Function, education/learning barriers              []Environmental, home barriers              []profession/work barriers  []past PT/medical experience  []other:  Justification:     Falls Risk Assessment (30 days):   [] Falls Risk assessed and no intervention required.   [x] Falls Risk assessed and Patient requires intervention due to being higher risk   TUG score (>12s at risk):     [x] Falls education provided, including gait training       ASSESSMENT:   Functional Impairments:     []Noted lumbar/proximal hip/LE joint hypomobility   [x]Decreased LE functional ROM   [x]Decreased core/proximal hip strength and neuromuscular control   [x]Decreased LE functional strength   [x]Reduced balance/proprioceptive control   []other:      Functional Activity Limitations (from functional questionnaire and intake)   [x]Reduced ability to tolerate prolonged functional positions   []Reduced ability or difficulty with changes of positions or transfers between positions   [x]Reduced ability to maintain good posture and demonstrate good body mechanics with sitting, bending, and lifting   [x]Reduced ability to sleep   [x] Reduced ability or tolerance with driving and/or computer work   [x]Reduced ability to perform lifting, carrying tasks   [x]Reduced ability to squat   [x]Reduced ability to forward bend   [x]Reduced ability to ambulate prolonged functional periods/distances/surfaces   [x]Reduced ability to ascend/descend stairs   [x]Reduced ability to run, hop, cut or jump   []other:    Participation Restrictions   [x]Reduced participation in self care activities   [x]Reduced participation in home management activities   [x]Reduced participation in work activities   [x]Reduced participation in social activities. [x]Reduced participation in sport/recreation activities. Classification :    [x]Signs/symptoms consistent with post-surgical status including decreased ROM, strength and function.    []Signs/symptoms consistent with joint sprain/strain   []Signs/symptoms consistent with patella-femoral syndrome   []Signs/symptoms consistent with knee OA/hip OA   []Signs/symptoms consistent with internal derangement of knee/Hip   []Signs/symptoms consistent with functional hip weakness/NMR control      []Signs/symptoms consistent with tendinitis/tendinosis    []signs/symptoms consistent with pathology which may benefit from Dry needling      []other:      Prognosis/Rehab Potential:      []Excellent   [x]Good    []Fair   []Poor    Tolerance of evaluation/treatment:    []Excellent   [x]Good    []Fair   []Poor    Physical Therapy Evaluation Complexity Justification   [x] A history of present problem with:  [] no personal factors and/or comorbidities that impact the plan of care;  []1-2 personal factors and/or decrease in pain to facilitate improvement in movement, function, and ADLs as indicated by Functional Deficits. [] Progressing: [] Met: [] Not Met: [] Adjusted     Long Term Goals: To be achieved in: 12 weeks  1. Disability index score of 20% or less for the LEFS to assist with reaching prior level of function. [] Progressing: [] Met: [] Not Met: [] Adjusted   2. Patient will demonstrate increased AROM to equal the opposite side bilaterally to allow for proper joint functioning as indicated by patients Functional Deficits. [] Progressing: [] Met: [] Not Met: [] Adjusted   3. Patient will demonstrate an increase in strength of Left Hip and Knee = Right Hip and Knee to allow for proper functional mobility as indicated by patients Functional Deficits. [] Progressing: [] Met: [] Not Met: [] Adjusted   4. Patient will return to all transfers, work activities, and functional activities without increased symptoms or restriction. [] Progressing: [] Met: [] Not Met: [] Adjusted   5. Patient will have </=1-2/10 pain with ADL's.  [] Progressing: [] Met: [] Not Met: [] Adjusted   6.  Patient stated goal: To return to normal walking and functional activity   [] Progressing: [] Met: [] Not Met: [] Adjusted      Electronically signed by:  Shakeel Sal, PT, MPT,ATC

## 2021-10-11 NOTE — FLOWSHEET NOTE
[facility], [address] [place] [address] Halina Davila 71, [address]  Phone: [phone], [phone]    Physical Therapy Treatment Note/ Progress Report:     Date:  10/11/2021    Patient Name:  Mark Sarabia    :  1950  MRN: [id number]  Restrictions/Precautions:    Medical/Treatment Diagnosis Information:  · Diagnosis: Left Knee OA s/p Left TKA 10/6/2021  · Treatment Diagnosis: O47.48  Insurance/Certification information:  PT Insurance Information: Medicare  Physician Information:  Referring Practitioner: Catherine Orlando  Has the plan of care been signed (Y/N):        []  Yes  [x]  No     Date of Patient follow up with Physician: Oct 21, 2021    Is this a Progress Report:     []  Yes  [x]  No      If Yes:  Date Range for reporting period:  Initial Eval: 10/11/2021  Beginning: 10/11/2021 --- Endin/10/2021    Progress report will be due (10 Rx or 30 days whichever is less):      Recertification will be due (POC Duration  / 90 days whichever is less): 2022      Visit # Insurance Allowable Auth Required   In Person 1 Med Nec []  Yes     []  No    Tele Health -  []  Yes     []  No    Total 1       Functional Scale: LEFS: 75% (Score: 20/80)   Date assessed: 10/11/2021      Latex Allergy:  [x]NO      []YES  Preferred Language for Healthcare:   [x]English       []other:    Pain level:  3-4/10 at rest; 10/10 at worst     SUBJECTIVE:  See eval    OBJECTIVE: See eval   Observation:    Test measurements:      RESTRICTIONS/PRECAUTIONS: Hx HTN, Hyperlipidemia;  Hx stent placment    Exercises/Interventions:   Therapeutic Ex (67856)  Therapeutic Activity (82217)  NMR re-education (35726) Sets/Reps Notes/CUES   Bike          Quad Sets 15 x 5\"    Hamstring Sets 15 x 2\"    SAQ 10 x 5\"    SLR 3 x 5 Max A    Heel Slides with belt 10 x 10\"    Heel Prop 3 x 1'         Long Sit Gastroc Stretch 3 x 30\"    Long Sit Hamstring Stretch  3 x 30\" Manual Intervention (10075)     Patella Mobs 5'    PROM Knee Flex/Ext 10'                             [address] access code:   Access Code: Dai Madden: [email or web address]  Date: 10/11/2021  Prepared by: Izabella Dumont    Exercises  Seated Table Hamstring Stretch - 1 x daily - 7 x weekly - 5 reps - 1 sets - 30\" hold  Long Sitting Calf Stretch with Strap - 1 x daily - 7 x weekly - 5 reps - 1 sets - 30\" hold  Supine Quad Set - 1-2 x daily - 7 x weekly - 1-2 sets - 10 reps - 5\" hold  Supine Short Arc Quad - 1 x daily - 7 x weekly - 10 reps - 3 sets  Active Straight Leg Raise with Quad Set - 1 x daily - 7 x weekly - 3 sets - 10 reps  Seated Knee Flexion AAROM - 1 x daily - 7 x weekly - 1 sets - 10 reps - 10\" hold               Patient Education 10' Pt education with HEP and progression of PT along with compliance with HEP to aide with formal PT for optimal outcomes. Education with proper gait training to improve heel strike and TKE        Therapeutic Exercise and NMR EXR  [x] (56622) Provided verbal/tactile cueing for activities related to strengthening, flexibility, endurance, ROM for improvements in LE, proximal hip, and core control with self care, mobility, lifting, ambulation. [x] (43065) Provided verbal/tactile cueing for activities related to improving balance, coordination, kinesthetic sense, posture, motor skill, proprioception to assist with LE, proximal hip, and core control in self-care, mobility, lifting, ambulation and eccentric single leg control. NMR and Therapeutic Activities:    [x] (84093 or 49350) Provided verbal/tactile cueing for activities related to improving balance, coordination, kinesthetic sense, posture, motor skill, proprioception and motor activation to allow for proper function of core, proximal hip and LE with self-care and ADLs and functional mobility.    [x] (83387) Gait Re-education- Provided training and instruction to the patient for proper LE, core and proximal hip recruitment and positioning and eccentric body weight control with ambulation re-education including up and down stairs     Home Exercise Program:    [x] (51784) Reviewed/Progressed HEP activities related to strengthening, flexibility, endurance, ROM of core, proximal hip and LE for functional self-care, mobility, lifting and ambulation/stair navigation   [x] (80551) Reviewed/Progressed HEP activities related to improving balance, coordination, kinesthetic sense, posture, motor skill, proprioception of core, proximal hip and LE for self-care, mobility, lifting, and ambulation/stair navigation      Manual Treatments:  PROM / STM / Oscillations-Mobs:  G-I, II, III, IV (PA's, Inf., Post.)  [x] (26361) Provided manual therapy to mobilize LE, proximal hip and/or LS spine soft tissue/joints for the purpose of modulating pain, promoting relaxation, increasing ROM, reducing/eliminating soft tissue swelling/inflammation/restriction, improving soft tissue extensibility and allowing for proper ROM for normal function with self-care, mobility, lifting and ambulation. Modalities:  Vaso x 10' for edema and pain control. Charges:  Timed Code Treatment Minutes: 48'   Total Treatment Minutes:  [de-identified]'   BWC:  TE TIME:  NMR TIME:  MANUAL TIME:  UNTIMED MINUTES:  Medicare Total:    -  -  -  -  Visits (1)  Total: $130      [x] EVAL (LOW) 13298 (typically 20 minutes face-to-face)  [] EVAL (MOD) 01134 (typically 30 minutes face-to-face)  [] EVAL (HIGH) 24454 (typically 45 minutes face-to-face)  [] RE-EVAL     [x] HK(62504) x 1    [] IONTO  [] NMR (06894) x     [x] VASO  [x] Manual (99795) x 1    [] Other:  [] TA x      [] Mech Traction (23597)  [] ES(attended) (40639)      [] ES (un) (49251):    ASSESSMENT:  See eval    GOALS:   Short Term Goals: To be achieved in: 2 weeks  1. Independent in HEP and progression per patient tolerance, in order to prevent re-injury. [] Progressing: [] Met: [] Not Met: [] Adjusted   2. by fatigue  [] Patient limited by pain    [] Patient limited by other medical complications  [] Other:     Return to Play: (if applicable)   []  Stage 1: Intro to Strength   []  Stage 2: Return to Run and Strength   []  Stage 3: Return to Jump and Strength   []  Stage 4: Dynamic Strength and Agility   []  Stage 5: Sport Specific Training     []  Ready to Return to Play, Meets All Above Stages   []  Not Ready for Return to Sports   Comments:                         PLAN: See eval  [] Continue per plan of care [] Alter current plan (see comments above)  [x] Plan of care initiated [] Hold pending MD visit [] Discharge    Electronically signed by:  Alex Davis, PT, MPT,ATC    Note: If patient does not return for scheduled/ recommended follow up visits, this note will serve as a discharge from care along with most recent update on progress.

## 2021-10-13 ENCOUNTER — HOSPITAL ENCOUNTER (OUTPATIENT)
Dept: PHYSICAL THERAPY | Age: 71
Setting detail: THERAPIES SERIES
Discharge: HOME | End: 2021-10-13
Payer: MEDICARE

## 2021-10-13 PROCEDURE — 97110 THERAPEUTIC EXERCISES: CPT | Performed by: PHYSICAL THERAPY ASSISTANT

## 2021-10-13 PROCEDURE — 97016 VASOPNEUMATIC DEVICE THERAPY: CPT | Performed by: PHYSICAL THERAPY ASSISTANT

## 2021-10-13 PROCEDURE — 97140 MANUAL THERAPY 1/> REGIONS: CPT | Performed by: PHYSICAL THERAPY ASSISTANT

## 2021-10-13 PROCEDURE — 97112 NEUROMUSCULAR REEDUCATION: CPT | Performed by: PHYSICAL THERAPY ASSISTANT

## 2021-10-13 NOTE — FLOWSHEET NOTE
placment    Exercises/Interventions:   Therapeutic Ex (85412)  Therapeutic Activity (07254)  NMR re-education (02389) Sets/Reps Notes/CUES   Bike          Quad Sets 15 x 5\"    Hamstring Sets 15 x 2\"    SAQ 20 x 5\"    SLR 2x10 Max A    SL SLR  2x10    LAQ  X10          Heel Slides with belt 10 x 10\"    Heel Prop 3 x 1'         Long Sit Gastroc Stretch 3 x 30\"    Long Sit Hamstring Stretch  3 x 30\"               Standing Weight shifts x10    HR  x10                                                 Manual Intervention (48729)     Patella Mobs 5'    PROM Knee Flex/Ext 10'                             [employer] access code:   Access Code: [email or web address]  URL: [email or web address]  Date: 10/11/2021  Prepared by: Izabella Dumont    Exercises  Seated Table Hamstring Stretch - 1 x daily - 7 x weekly - 5 reps - 1 sets - 30\" hold  Long Sitting Calf Stretch with Strap - 1 x daily - 7 x weekly - 5 reps - 1 sets - 30\" hold  Supine Quad Set - 1-2 x daily - 7 x weekly - 1-2 sets - 10 reps - 5\" hold  Supine Short Arc Quad - 1 x daily - 7 x weekly - 10 reps - 3 sets  Active Straight Leg Raise with Quad Set - 1 x daily - 7 x weekly - 3 sets - 10 reps  Seated Knee Flexion AAROM - 1 x daily - 7 x weekly - 1 sets - 10 reps - 10\" hold               Patient Education 10' Pt education with HEP and progression of PT along with compliance with HEP to aide with formal PT for optimal outcomes. Education with proper gait training to improve heel strike and TKE        Therapeutic Exercise and NMR EXR  [x] (19498) Provided verbal/tactile cueing for activities related to strengthening, flexibility, endurance, ROM for improvements in LE, proximal hip, and core control with self care, mobility, lifting, ambulation.   [x] (61056) Provided verbal/tactile cueing for activities related to improving balance, coordination, kinesthetic sense, posture, motor skill, proprioception to assist with LE, proximal hip, and core control in self-care, mobility, lifting, ambulation and eccentric single leg control. NMR and Therapeutic Activities:    [x] (83864 or 86360) Provided verbal/tactile cueing for activities related to improving balance, coordination, kinesthetic sense, posture, motor skill, proprioception and motor activation to allow for proper function of core, proximal hip and LE with self-care and ADLs and functional mobility. [x] (02487) Gait Re-education- Provided training and instruction to the patient for proper LE, core and proximal hip recruitment and positioning and eccentric body weight control with ambulation re-education including up and down stairs     Home Exercise Program:    [x] (25743) Reviewed/Progressed HEP activities related to strengthening, flexibility, endurance, ROM of core, proximal hip and LE for functional self-care, mobility, lifting and ambulation/stair navigation   [x] (06768) Reviewed/Progressed HEP activities related to improving balance, coordination, kinesthetic sense, posture, motor skill, proprioception of core, proximal hip and LE for self-care, mobility, lifting, and ambulation/stair navigation      Manual Treatments:  PROM / STM / Oscillations-Mobs:  G-I, II, III, IV (PA's, Inf., Post.)  [x] (77588) Provided manual therapy to mobilize LE, proximal hip and/or LS spine soft tissue/joints for the purpose of modulating pain, promoting relaxation, increasing ROM, reducing/eliminating soft tissue swelling/inflammation/restriction, improving soft tissue extensibility and allowing for proper ROM for normal function with self-care, mobility, lifting and ambulation. Modalities:  Vaso x 10' for edema and pain control.      Charges:  Timed Code Treatment Minutes: 61'   Total Treatment Minutes:  79'   BWC:  TE TIME:  NMR TIME:  MANUAL TIME:  UNTIMED MINUTES:  Medicare Total:    -  -  -  -  Visits (1)  Total: $260      [] EVAL (LOW) 28150 (typically 20 minutes face-to-face)  [] EVAL (MOD) 91456 (typically 30 minutes face-to-face)  [] EVAL (HIGH) 85136 (typically 45 minutes face-to-face)  [] RE-EVAL     [x] ZI(31665) x 1    [] IONTO  [x] NMR (46864) x   1  [x] VASO  [x] Manual (02600) x 1    [] Other:  [] TA x      [] Mech Traction (61781)  [] ES(attended) (36048)      [] ES (un) (70344):    ASSESSMENT:  See eval    GOALS:   Short Term Goals: To be achieved in: 2 weeks  1. Independent in HEP and progression per patient tolerance, in order to prevent re-injury. [x] Progressing: [] Met: [] Not Met: [] Adjusted   2. Patient will have a decrease in pain to facilitate improvement in movement, function, and ADLs as indicated by Functional Deficits. [x] Progressing: [] Met: [] Not Met: [] Adjusted     Long Term Goals: To be achieved in: 12 weeks  1. Disability index score of 20% or less for the LEFS to assist with reaching prior level of function. [] Progressing: [] Met: [] Not Met: [] Adjusted   2. Patient will demonstrate increased AROM to equal the opposite side bilaterally to allow for proper joint functioning as indicated by patients Functional Deficits. [] Progressing: [] Met: [] Not Met: [] Adjusted   3. Patient will demonstrate an increase in strength of Left Hip and Knee = Right Hip and Knee to allow for proper functional mobility as indicated by patients Functional Deficits. [] Progressing: [] Met: [] Not Met: [] Adjusted   4. Patient will return to all transfers, work activities, and functional activities without increased symptoms or restriction. [] Progressing: [] Met: [] Not Met: [] Adjusted   5. Patient will have </=1-2/10 pain with ADL's.  [] Progressing: [] Met: [] Not Met: [] Adjusted   6. Patient stated goal: To return to normal walking and functional activity   [] Progressing: [] Met: [] Not Met: [] Adjusted    Overall Progression Towards Functional goals/ Treatment Progress Update:  [x] Patient is progressing as expected towards functional goals listed.     [] Progression is slowed due to complexities/Impairments listed. [] Progression has been slowed due to co-morbidities. [] Plan just implemented, too soon to assess goals progression <30days   [] Goals require adjustment due to lack of progress  [] Patient is not progressing as expected and requires additional follow up with physician  [] Other    Prognosis for POC: [x] Good [] Fair  [] Poor    Patient requires continued skilled intervention: [x] Yes  [] No    Treatment/Activity Tolerance:  [x] Patient able to complete treatment  [] Patient limited by fatigue  [] Patient limited by pain    [] Patient limited by other medical complications  [] Other:     Return to Play: (if applicable)   []  Stage 1: Intro to Strength   []  Stage 2: Return to Run and Strength   []  Stage 3: Return to Jump and Strength   []  Stage 4: Dynamic Strength and Agility   []  Stage 5: Sport Specific Training     []  Ready to Return to Play, Meets All Above Stages   []  Not Ready for Return to Sports   Comments:                         PLAN: See eval  [x] Continue per plan of care [] Alter current plan (see comments above)  [] Plan of care initiated [] Hold pending MD visit [] Discharge    Electronically signed by:  Jono Matute PTA    Note: If patient does not return for scheduled/ recommended follow up visits, this note will serve as a discharge from care along with most recent update on progress.

## 2021-10-18 ENCOUNTER — HOSPITAL ENCOUNTER (OUTPATIENT)
Dept: PHYSICAL THERAPY | Age: 71
Setting detail: THERAPIES SERIES
Discharge: HOME | End: 2021-10-18
Payer: MEDICARE

## 2021-10-18 PROCEDURE — G0283 ELEC STIM OTHER THAN WOUND: HCPCS | Performed by: PHYSICAL THERAPY ASSISTANT

## 2021-10-18 PROCEDURE — 97140 MANUAL THERAPY 1/> REGIONS: CPT | Performed by: PHYSICAL THERAPY ASSISTANT

## 2021-10-18 PROCEDURE — 97110 THERAPEUTIC EXERCISES: CPT | Performed by: PHYSICAL THERAPY ASSISTANT

## 2021-10-18 NOTE — FLOWSHEET NOTE
[facility], [address] [place] [address] Kenia Davila, [address]  Phone: [phone], [phone]    Physical Therapy Treatment Note/ Progress Report:     Date:  10/18/2021    Patient Name:  Darya Man    :  1950  MRN: [id number]  Restrictions/Precautions:    Medical/Treatment Diagnosis Information:  · Diagnosis: Left Knee OA s/p Left TKA 10/6/2021  · Treatment Diagnosis: O05.95  Insurance/Certification information:  PT Insurance Information: Medicare  Physician Information:  Referring Practitioner: Isrrael Neither  Has the plan of care been signed (Y/N):        []  Yes  [x]  No     Date of Patient follow up with Physician: Oct 21, 2021    Is this a Progress Report:     []  Yes  [x]  No      If Yes:  Date Range for reporting period:  Initial Eval: 10/11/2021  Beginning: 10/11/2021 --- Endin/10/2021    Progress report will be due (10 Rx or 30 days whichever is less):      Recertification will be due (POC Duration  / 90 days whichever is less): 2022      Visit # Insurance Allowable Auth Required   In Person 3 Med Nec []  Yes     []  No    Tele Health -  []  Yes     []  No    Total 3       Functional Scale: LEFS: 75% (Score: 20/80)   Date assessed: 10/11/2021      Latex Allergy:  [x]NO      []YES  Preferred Language for Healthcare:   [x]English       []other:    Pain level:  8-9/10 when up and moving       SUBJECTIVE: Patient reports that he has been in excruciating pain and has had moments/episodes that he felt he was going to pass out. OBJECTIVE: See eval   Observation:    Test measurements:  4 degrees from zero, 92 degrees flexion (reiterated importance of HEP)     RESTRICTIONS/PRECAUTIONS: Hx HTN, Hyperlipidemia;  Hx stent placment    Exercises/Interventions:   Therapeutic Ex (51489)  Therapeutic Activity (67403)  NMR re-education (00341) Sets/Reps Notes/CUES   Bike          Glute sets 10 x 10\"    Quad Sets 15 x 5\"    Hamstring Sets 15 x 2\"    SAQ  unable SLR Max A    SL SLR     LAQ  x10          Heel Slides with belt 10 x 10\"    Heel Prop 3 x 1'         Long Sit Gastroc Stretch  3  x   30\"    Long Sit Hamstring Stretch 3  x  30\"               Standing Weight shifts    HR                                                  Manual Intervention (38936)     Patella Mobs 5'    PROM Knee Flex/Ext 10'         Hamstring stretch 3x30\"    STM to hamstrings, IT Band, and Quad 10 min              [employer] access code:   Access Code: [email or web address]  URL: [email or web address]  Date: 10/11/2021  Prepared by: Yordy Cool    Exercises  Seated Table Hamstring Stretch - 1 x daily - 7 x weekly - 5 reps - 1 sets - 30\" hold  Long Sitting Calf Stretch with Strap - 1 x daily - 7 x weekly - 5 reps - 1 sets - 30\" hold  Supine Quad Set - 1-2 x daily - 7 x weekly - 1-2 sets - 10 reps - 5\" hold  Supine Short Arc Quad - 1 x daily - 7 x weekly - 10 reps - 3 sets  Active Straight Leg Raise with Quad Set - 1 x daily - 7 x weekly - 3 sets - 10 reps  Seated Knee Flexion AAROM - 1 x daily - 7 x weekly - 1 sets - 10 reps - 10\" hold               Patient Education 10' Pt education with HEP and progression of PT along with compliance with HEP to aide with formal PT for optimal outcomes. Education with proper gait training to improve heel strike and TKE        Therapeutic Exercise and NMR EXR  [x] (59949) Provided verbal/tactile cueing for activities related to strengthening, flexibility, endurance, ROM for improvements in LE, proximal hip, and core control with self care, mobility, lifting, ambulation. [x] (70572) Provided verbal/tactile cueing for activities related to improving balance, coordination, kinesthetic sense, posture, motor skill, proprioception to assist with LE, proximal hip, and core control in self-care, mobility, lifting, ambulation and eccentric single leg control.      NMR and Therapeutic Activities:    [x] (56419 or 21478) Provided verbal/tactile cueing for activities related to improving balance, coordination, kinesthetic sense, posture, motor skill, proprioception and motor activation to allow for proper function of core, proximal hip and LE with self-care and ADLs and functional mobility. [x] (31807) Gait Re-education- Provided training and instruction to the patient for proper LE, core and proximal hip recruitment and positioning and eccentric body weight control with ambulation re-education including up and down stairs     Home Exercise Program:    [x] (04627) Reviewed/Progressed HEP activities related to strengthening, flexibility, endurance, ROM of core, proximal hip and LE for functional self-care, mobility, lifting and ambulation/stair navigation   [x] (36777) Reviewed/Progressed HEP activities related to improving balance, coordination, kinesthetic sense, posture, motor skill, proprioception of core, proximal hip and LE for self-care, mobility, lifting, and ambulation/stair navigation      Manual Treatments:  PROM / STM / Oscillations-Mobs:  G-I, II, III, IV (PA's, Inf., Post.)  [x] (03905) Provided manual therapy to mobilize LE, proximal hip and/or LS spine soft tissue/joints for the purpose of modulating pain, promoting relaxation, increasing ROM, reducing/eliminating soft tissue swelling/inflammation/restriction, improving soft tissue extensibility and allowing for proper ROM for normal function with self-care, mobility, lifting and ambulation.      Modalities:   unavailable   ES/CP 10 min     Charges:  Timed Code Treatment Minutes: 61'   Total Treatment Minutes:  79'   [facility]:  TE TIME:  NMR TIME:  MANUAL TIME:  UNTIMED MINUTES:  Medicare Total:    -  -  -  -  Visits (3)  Total: $390      [] EVAL (LOW) 32733 (typically 20 minutes face-to-face)  [] EVAL (MOD) 95600 (typically 30 minutes face-to-face)  [] EVAL (HIGH) 66682 (typically 45 minutes face-to-face)  [] RE-EVAL     [x] WW(19724) x 1    [] IONTO  [] NMR (73195) x     [] VASO  [x] Manual (92078) x 2    [] Other:  [] TA x [] [facility] Traction (40368)  [] ES(attended) (38406)      [x] ES (un) (31602):    ASSESSMENT:  Patient tolerated treatment with increased amounts of pain today - he had heavy breathing, light headedness, clammy skin, and was pale in color along with difficulty relaxing and increased spasms and guarding. PT focused on passive mobility, isometrics, and reduction of tissue reactivity to improve symptoms. GOALS:   Short Term Goals: To be achieved in: 2 weeks  1. Independent in HEP and progression per patient tolerance, in order to prevent re-injury. [x] Progressing: [] Met: [] Not Met: [] Adjusted   2. Patient will have a decrease in pain to facilitate improvement in movement, function, and ADLs as indicated by Functional Deficits. [x] Progressing: [] Met: [] Not Met: [] Adjusted     Long Term Goals: To be achieved in: 12 weeks  1. Disability index score of 20% or less for the LEFS to assist with reaching prior level of function. [] Progressing: [] Met: [] Not Met: [] Adjusted   2. Patient will demonstrate increased AROM to equal the opposite side bilaterally to allow for proper joint functioning as indicated by patients Functional Deficits. [] Progressing: [] Met: [] Not Met: [] Adjusted   3. Patient will demonstrate an increase in strength of Left Hip and Knee = Right Hip and Knee to allow for proper functional mobility as indicated by patients Functional Deficits. [] Progressing: [] Met: [] Not Met: [] Adjusted   4. Patient will return to all transfers, work activities, and functional activities without increased symptoms or restriction. [] Progressing: [] Met: [] Not Met: [] Adjusted   5. Patient will have </=1-2/10 pain with ADL's.  [] Progressing: [] Met: [] Not Met: [] Adjusted   6.  Patient stated goal: To return to normal walking and functional activity   [] Progressing: [] Met: [] Not Met: [] Adjusted    Overall Progression Towards Functional goals/ Treatment Progress Update:  [x] Patient is progressing as expected towards functional goals listed. [] Progression is slowed due to complexities/Impairments listed. [] Progression has been slowed due to co-morbidities. [] Plan just implemented, too soon to assess goals progression <30days   [] Goals require adjustment due to lack of progress  [] Patient is not progressing as expected and requires additional follow up with physician  [] Other    Prognosis for POC: [x] Good [] Fair  [] Poor    Patient requires continued skilled intervention: [x] Yes  [] No    Treatment/Activity Tolerance:  [x] Patient able to complete treatment  [] Patient limited by fatigue  [] Patient limited by pain    [] Patient limited by other medical complications  [] Other:     Return to Play: (if applicable)   []  Stage 1: Intro to Strength   []  Stage 2: Return to Run and Strength   []  Stage 3: Return to Jump and Strength   []  Stage 4: Dynamic Strength and Agility   []  Stage 5: Sport Specific Training     []  Ready to Return to Play, Meets All Above Stages   []  Not Ready for Return to Sports   Comments:                         PLAN:   [x] Continue per plan of care [] Alter current plan (see comments above)  [] Plan of care initiated [] Hold pending MD visit [] Discharge    Electronically signed by:  Bentley Macdonald PTA    Note: If patient does not return for scheduled/ recommended follow up visits, this note will serve as a discharge from care along with most recent update on progress.

## 2021-10-21 ENCOUNTER — APPOINTMENT (OUTPATIENT)
Dept: PHYSICAL THERAPY | Age: 71
End: 2021-10-21
Payer: MEDICARE

## 2021-10-22 ENCOUNTER — HOSPITAL ENCOUNTER (OUTPATIENT)
Dept: PHYSICAL THERAPY | Age: 71
Setting detail: THERAPIES SERIES
Discharge: HOME | End: 2021-10-22
Payer: MEDICARE

## 2021-10-22 PROCEDURE — 97016 VASOPNEUMATIC DEVICE THERAPY: CPT | Performed by: PHYSICAL THERAPY ASSISTANT

## 2021-10-22 PROCEDURE — 97140 MANUAL THERAPY 1/> REGIONS: CPT | Performed by: PHYSICAL THERAPY ASSISTANT

## 2021-10-22 PROCEDURE — 97110 THERAPEUTIC EXERCISES: CPT | Performed by: PHYSICAL THERAPY ASSISTANT

## 2021-10-22 NOTE — FLOWSHEET NOTE
Formerly [facility], [address] [place] [address] Ken[place] Davila, [address]  Phone: [phone], [phone]    Physical Therapy Treatment Note/ Progress Report:     Date:  10/22/2021    Patient Name:  Jenny Santos    :  1950  MRN: [id number]  Restrictions/Precautions:    Medical/Treatment Diagnosis Information:  · Diagnosis: Left Knee OA s/p Left TKA 10/6/2021  · Treatment Diagnosis: R60.66  Insurance/Certification information:  PT Insurance Information: Medicare  Physician Information:  Referring Practitioner: Pebbles Samayoa  Has the plan of care been signed (Y/N):        []  Yes  [x]  No     Date of Patient follow up with Physician: Oct 21, 2021    Is this a Progress Report:     []  Yes  [x]  No      If Yes:  Date Range for reporting period:  Initial Eval: 10/11/2021  Beginning: 10/11/2021 --- Endin/10/2021    Progress report will be due (10 Rx or 30 days whichever is less):      Recertification will be due (POC Duration  / 90 days whichever is less): 2022      Visit # Insurance Allowable Auth Required   In Person [facility] []  Yes     []  No    Tele Health -  []  Yes     []  No    Total 4       Functional Scale: LEFS: 75% (Score: 20/80)   Date assessed: 10/11/2021      Latex Allergy:  [x]NO      []YES  Preferred Language for Healthcare:   [x]English       []other:    Pain level:  6-7/10 when up and moving       SUBJECTIVE: Has a lot of tightness in the knee but feeling a little bit better. MD OK with his progress - feels the neuropathy is an issue and he is not overly concerned with his ROM. MD still wants him on the CPM for at least another week. Up to 110 on CPM yesterday. Was also given a muscle relaxer yesterday. OBJECTIVE: See eval   Observation:    Test measurements:  4 degrees from zero, 92 degrees flexion (reiterated importance of HEP)    4 from zero to 106 flexion 10/22/2021       RESTRICTIONS/PRECAUTIONS: Hx HTN, Hyperlipidemia;  Hx stent placment    Exercises/Interventions:   Therapeutic Ex (66005)  Therapeutic Activity (45986)  NMR re-education (12811) Sets/Reps Notes/CUES   Bike 5' ROM only         Glute sets 10 x 10\"    Quad Sets 15 x 5\"    Hamstring Sets 15 x 2\"    SAQ 20 x 5\" unable   SLR 2x10 Max A    SL SLR     LAQ  x20         Heel Slides with belt 10 x 10\"    Heel Prop 3 x 1'         Long Sit Gastroc Stretch  3  x   30\"    Long Sit Hamstring Stretch 3  x  30\"               Standing Weight shifts    HR                                                  Manual Intervention (26247)     Patella Mobs 5'    PROM Knee Flex/Ext 10'         Hamstring stretch 3x30\"    STM to hamstrings, IT Band, and Quad 10 min              Medbridge access code:   Access Code: [email or web address]  URL: [email or web address]  Date: 10/11/2021  Prepared by: Fransisco Gallo    Exercises  Seated Table Hamstring Stretch - 1 x daily - 7 x weekly - 5 reps - 1 sets - 30\" hold  Long Sitting Calf Stretch with Strap - 1 x daily - 7 x weekly - 5 reps - 1 sets - 30\" hold  Supine Quad Set - 1-2 x daily - 7 x weekly - 1-2 sets - 10 reps - 5\" hold  Supine Short Arc Quad - 1 x daily - 7 x weekly - 10 reps - 3 sets  Active Straight Leg Raise with Quad Set - 1 x daily - 7 x weekly - 3 sets - 10 reps  Seated Knee Flexion AAROM - 1 x daily - 7 x weekly - 1 sets - 10 reps - 10\" hold               Patient Education 10' Pt education with HEP and progression of PT along with compliance with HEP to aide with formal PT for optimal outcomes. Education with proper gait training to improve heel strike and TKE        Therapeutic Exercise and NMR EXR  [x] (88435) Provided verbal/tactile cueing for activities related to strengthening, flexibility, endurance, ROM for improvements in LE, proximal hip, and core control with self care, mobility, lifting, ambulation.   [x] (73465) Provided verbal/tactile cueing for activities related to improving balance, coordination, kinesthetic sense, posture, motor skill, proprioception to assist with LE, proximal hip, and core control in self-care, mobility, lifting, ambulation and eccentric single leg control. NMR and Therapeutic Activities:    [x] (67868 or 85310) Provided verbal/tactile cueing for activities related to improving balance, coordination, kinesthetic sense, posture, motor skill, proprioception and motor activation to allow for proper function of core, proximal hip and LE with self-care and ADLs and functional mobility. [x] (14116) Gait Re-education- Provided training and instruction to the patient for proper LE, core and proximal hip recruitment and positioning and eccentric body weight control with ambulation re-education including up and down stairs     Home Exercise Program:    [x] (51009) Reviewed/Progressed HEP activities related to strengthening, flexibility, endurance, ROM of core, proximal hip and LE for functional self-care, mobility, lifting and ambulation/stair navigation   [x] (16867) Reviewed/Progressed HEP activities related to improving balance, coordination, kinesthetic sense, posture, motor skill, proprioception of core, proximal hip and LE for self-care, mobility, lifting, and ambulation/stair navigation      Manual Treatments:  PROM / STM / Oscillations-Mobs:  G-I, II, III, IV (PA's, Inf., Post.)  [x] (87151) Provided manual therapy to mobilize LE, proximal hip and/or LS spine soft tissue/joints for the purpose of modulating pain, promoting relaxation, increasing ROM, reducing/eliminating soft tissue swelling/inflammation/restriction, improving soft tissue extensibility and allowing for proper ROM for normal function with self-care, mobility, lifting and ambulation.      Modalities:  Vaso x 10' for edema and pain control with   ES/CP 10 min     Charges:  Timed Code Treatment Minutes: 60'   Total Treatment Minutes:  79'   BWC:  TE TIME:  NMR TIME:  MANUAL TIME:  UNTIMED MINUTES:  Medicare Total:    -  -  -  -  Visits (4)  Total: $520      [] EVAL (LOW) 71310 (typically 20 minutes face-to-face)  [] EVAL (MOD) 41164 (typically 30 minutes face-to-face)  [] EVAL (HIGH) 77941 (typically 45 minutes face-to-face)  [] RE-EVAL     [x] OO(73858) x 2    [] IONTO  [] NMR (91831) x     [x] VASO  [x] Manual (20681) x 1    [] Other:  [] TA x      [] Mech Traction (05000)  [] ES(attended) (94182)      [x] ES (un) (76592):    ASSESSMENT:  Patient tolerated treatment with increased amounts of pain today - he had heavy breathing, light headedness, clammy skin, and was pale in color along with difficulty relaxing and increased spasms and guarding. PT focused on passive mobility, isometrics, and reduction of tissue reactivity to improve symptoms. GOALS:   Short Term Goals: To be achieved in: 2 weeks  1. Independent in HEP and progression per patient tolerance, in order to prevent re-injury. [x] Progressing: [] Met: [] Not Met: [] Adjusted   2. Patient will have a decrease in pain to facilitate improvement in movement, function, and ADLs as indicated by Functional Deficits. [x] Progressing: [] Met: [] Not Met: [] Adjusted     Long Term Goals: To be achieved in: 12 weeks  1. Disability index score of 20% or less for the LEFS to assist with reaching prior level of function. [] Progressing: [] Met: [] Not Met: [] Adjusted   2. Patient will demonstrate increased AROM to equal the opposite side bilaterally to allow for proper joint functioning as indicated by patients Functional Deficits. [] Progressing: [] Met: [] Not Met: [] Adjusted   3. Patient will demonstrate an increase in strength of Left Hip and Knee = Right Hip and Knee to allow for proper functional mobility as indicated by patients Functional Deficits. [] Progressing: [] Met: [] Not Met: [] Adjusted   4. Patient will return to all transfers, work activities, and functional activities without increased symptoms or restriction. [] Progressing: [] Met: [] Not Met: [] Adjusted   5.  Patient will have </=1-2/10 pain with ADL's.  [] Progressing: [] Met: [] Not Met: [] Adjusted   6. Patient stated goal: To return to normal walking and functional activity   [] Progressing: [] Met: [] Not Met: [] Adjusted    Overall Progression Towards Functional goals/ Treatment Progress Update:  [x] Patient is progressing as expected towards functional goals listed. [] Progression is slowed due to complexities/Impairments listed. [] Progression has been slowed due to co-morbidities. [] Plan just implemented, too soon to assess goals progression <30days   [] Goals require adjustment due to lack of progress  [] Patient is not progressing as expected and requires additional follow up with physician  [] Other    Prognosis for POC: [x] Good [] Fair  [] Poor    Patient requires continued skilled intervention: [x] Yes  [] No    Treatment/Activity Tolerance:  [x] Patient able to complete treatment  [] Patient limited by fatigue  [] Patient limited by pain    [] Patient limited by other medical complications  [] Other:     Return to Play: (if applicable)   []  Stage 1: Intro to Strength   []  Stage 2: Return to Run and Strength   []  Stage 3: Return to Jump and Strength   []  Stage 4: Dynamic Strength and Agility   []  Stage 5: Sport Specific Training     []  Ready to Return to Play, Meets All Above Stages   []  Not Ready for Return to Sports   Comments:                         PLAN:   [x] Continue per plan of care [] Alter current plan (see comments above)  [] Plan of care initiated [] Hold pending MD visit [] Discharge    Electronically signed by:  Parisa Cuello PTA, ZAID    Note: If patient does not return for scheduled/ recommended follow up visits, this note will serve as a discharge from care along with most recent update on progress.

## 2021-10-25 ENCOUNTER — HOSPITAL ENCOUNTER (OUTPATIENT)
Dept: PHYSICAL THERAPY | Age: 71
Setting detail: THERAPIES SERIES
Discharge: HOME | End: 2021-10-25
Payer: MEDICARE

## 2021-10-25 PROCEDURE — 97110 THERAPEUTIC EXERCISES: CPT | Performed by: PHYSICAL THERAPY ASSISTANT

## 2021-10-25 PROCEDURE — 97140 MANUAL THERAPY 1/> REGIONS: CPT | Performed by: PHYSICAL THERAPY ASSISTANT

## 2021-10-25 PROCEDURE — 97112 NEUROMUSCULAR REEDUCATION: CPT | Performed by: PHYSICAL THERAPY ASSISTANT

## 2021-10-25 NOTE — FLOWSHEET NOTE
HS curls 2 x 10 L only   Standing march x10 L thanh   Leg Press 60# x30 DL         Glute sets 10 x 10\"    Quad Sets 15 x 5\"    Hamstring Sets 15 x 2\"    SAQ 2x10 unable   SLR 2 x 10 Max A    SL SLR  2 x 10   LAQ  x20         Heel Slides with belt & slide board 3\" x20    Heel Prop 2 x 2'         Long Sit Gastroc Stretch  3  x  30\"    Long Sit Hamstring Stretch 3  x  30\"                                                                      Manual Intervention (30098)     Patella Mobs 5'    PROM Knee Flex/Ext 10'         Hamstring stretch 3x30\"    STM to hamstrings, IT Band, and Quad 5 min              Medbridge access code:   Access Code: [email or web address]  URL: [email or web address]  Date: 10/11/2021  Prepared by: Yordy Cool    Exercises  Seated Table Hamstring Stretch - 1 x daily - 7 x weekly - 5 reps - 1 sets - 30\" hold  Long Sitting Calf Stretch with Strap - 1 x daily - 7 x weekly - 5 reps - 1 sets - 30\" hold  Supine Quad Set - 1-2 x daily - 7 x weekly - 1-2 sets - 10 reps - 5\" hold  Supine Short Arc Quad - 1 x daily - 7 x weekly - 10 reps - 3 sets  Active Straight Leg Raise with Quad Set - 1 x daily - 7 x weekly - 3 sets - 10 reps  Seated Knee Flexion AAROM - 1 x daily - 7 x weekly - 1 sets - 10 reps - 10\" hold               Patient Education 10' Pt education with HEP and progression of PT along with compliance with HEP to aide with formal PT for optimal outcomes. Education with proper gait training to improve heel strike and TKE        Therapeutic Exercise and NMR EXR  [x] (71637) Provided verbal/tactile cueing for activities related to strengthening, flexibility, endurance, ROM for improvements in LE, proximal hip, and core control with self care, mobility, lifting, ambulation.   [x] (44152) Provided verbal/tactile cueing for activities related to improving balance, coordination, kinesthetic sense, posture, motor skill, proprioception to assist with LE, proximal hip, and core control in self-care, mobility, lifting, ambulation and eccentric single leg control. NMR and Therapeutic Activities:    [x] (21422 or 88242) Provided verbal/tactile cueing for activities related to improving balance, coordination, kinesthetic sense, posture, motor skill, proprioception and motor activation to allow for proper function of core, proximal hip and LE with self-care and ADLs and functional mobility. [x] (21664) Gait Re-education- Provided training and instruction to the patient for proper LE, core and proximal hip recruitment and positioning and eccentric body weight control with ambulation re-education including up and down stairs     Home Exercise Program:    [x] (03365) Reviewed/Progressed HEP activities related to strengthening, flexibility, endurance, ROM of core, proximal hip and LE for functional self-care, mobility, lifting and ambulation/stair navigation   [x] (87276) Reviewed/Progressed HEP activities related to improving balance, coordination, kinesthetic sense, posture, motor skill, proprioception of core, proximal hip and LE for self-care, mobility, lifting, and ambulation/stair navigation      Manual Treatments:  PROM / STM / Oscillations-Mobs:  G-I, II, III, IV (PA's, Inf., Post.)  [x] (01125) Provided manual therapy to mobilize LE, proximal hip and/or LS spine soft tissue/joints for the purpose of modulating pain, promoting relaxation, increasing ROM, reducing/eliminating soft tissue swelling/inflammation/restriction, improving soft tissue extensibility and allowing for proper ROM for normal function with self-care, mobility, lifting and ambulation.      Modalities:  Vaso x 10' for edema and pain control with   ES/CP 10 min     Charges:  Timed Code Treatment Minutes: 60'   Total Treatment Minutes:  79'   BWC:  TE TIME:  NMR TIME:  MANUAL TIME:  UNTIMED MINUTES:  Medicare Total:    -  -  -  -  Visits (5)  Total: $650      [] EVAL (LOW) 70035 (typically 20 minutes face-to-face)  [] EVAL (MOD) 17882 (typically 30 minutes face-to-face)  [] EVAL (HIGH) 25306 (typically 45 minutes face-to-face)  [] RE-EVAL     [x] AA(42087) x 2    [] IONTO  [] NMR (19376) x     [x] VASO  [x] Manual (19118) x 1    [] Other:  [] TA x      [] Mech Traction (81259)  [] ES(attended) (67830)      [x] ES (un) (26749):    ASSESSMENT:  Patient tolerated treatment with less pain today. GOALS:   Short Term Goals: To be achieved in: 2 weeks  1. Independent in HEP and progression per patient tolerance, in order to prevent re-injury. [x] Progressing: [] Met: [] Not Met: [] Adjusted   2. Patient will have a decrease in pain to facilitate improvement in movement, function, and ADLs as indicated by Functional Deficits. [x] Progressing: [] Met: [] Not Met: [] Adjusted     Long Term Goals: To be achieved in: 12 weeks  1. Disability index score of 20% or less for the LEFS to assist with reaching prior level of function. [] Progressing: [] Met: [] Not Met: [] Adjusted   2. Patient will demonstrate increased AROM to equal the opposite side bilaterally to allow for proper joint functioning as indicated by patients Functional Deficits. [] Progressing: [] Met: [] Not Met: [] Adjusted   3. Patient will demonstrate an increase in strength of Left Hip and Knee = Right Hip and Knee to allow for proper functional mobility as indicated by patients Functional Deficits. [] Progressing: [] Met: [] Not Met: [] Adjusted   4. Patient will return to all transfers, work activities, and functional activities without increased symptoms or restriction. [] Progressing: [] Met: [] Not Met: [] Adjusted   5. Patient will have </=1-2/10 pain with ADL's.  [] Progressing: [] Met: [] Not Met: [] Adjusted   6. Patient stated goal: To return to normal walking and functional activity   [] Progressing: [] Met: [] Not Met: [] Adjusted    Overall Progression Towards Functional goals/ Treatment Progress Update:  [x] Patient is progressing as expected towards functional goals listed. [] Progression is slowed due to complexities/Impairments listed. [] Progression has been slowed due to co-morbidities. [] Plan just implemented, too soon to assess goals progression <30days   [] Goals require adjustment due to lack of progress  [] Patient is not progressing as expected and requires additional follow up with physician  [] Other    Prognosis for POC: [x] Good [] Fair  [] Poor    Patient requires continued skilled intervention: [x] Yes  [] No    Treatment/Activity Tolerance:  [x] Patient able to complete treatment  [] Patient limited by fatigue  [] Patient limited by pain    [] Patient limited by other medical complications  [] Other:     Return to Play: (if applicable)   []  Stage 1: Intro to Strength   []  Stage 2: Return to Run and Strength   []  Stage 3: Return to Jump and Strength   []  Stage 4: Dynamic Strength and Agility   []  Stage 5: Sport Specific Training     []  Ready to Return to Play, Meets All Above Stages   []  Not Ready for Return to Sports   Comments:                         PLAN:   [x] Continue per plan of care [] Alter current plan (see comments above)  [] Plan of care initiated [] Hold pending MD visit [] Discharge    Electronically signed by:  Daina Norris PTA    Note: If patient does not return for scheduled/ recommended follow up visits, this note will serve as a discharge from care along with most recent update on progress.

## 2021-10-28 ENCOUNTER — HOSPITAL ENCOUNTER (OUTPATIENT)
Dept: PHYSICAL THERAPY | Age: 71
Setting detail: THERAPIES SERIES
Discharge: HOME | End: 2021-10-28
Payer: MEDICARE

## 2021-10-28 PROCEDURE — 97110 THERAPEUTIC EXERCISES: CPT | Performed by: PHYSICAL THERAPY ASSISTANT

## 2021-10-28 PROCEDURE — 97140 MANUAL THERAPY 1/> REGIONS: CPT | Performed by: PHYSICAL THERAPY ASSISTANT

## 2021-10-28 PROCEDURE — 97016 VASOPNEUMATIC DEVICE THERAPY: CPT | Performed by: PHYSICAL THERAPY ASSISTANT

## 2021-10-28 NOTE — FLOWSHEET NOTE
[facility], [address] [place] [address] [address], [address]  Phone: [phone], [phone]    Physical Therapy Treatment Note/ Progress Report:     Date:  10/28/2021    Patient Name:  Arian Dawson    :  1950  MRN: [id number]  Restrictions/Precautions:    Medical/Treatment Diagnosis Information:  · Diagnosis: Left Knee OA s/p Left TKA 10/6/2021  · Treatment Diagnosis: H89.70  Insurance/Certification information:  PT Insurance Information: Medicare  Physician Information:  Referring Practitioner: Hodan Lim  Has the plan of care been signed (Y/N):        []  Yes  [x]  No     Date of Patient follow up with Physician: Oct 21, 2021    Is this a Progress Report:     []  Yes  [x]  No      If Yes:  Date Range for reporting period:  Initial Eval: 10/11/2021  Beginning: 10/11/2021 --- Endin/10/2021    Progress report will be due (10 Rx or 30 days whichever is less):      Recertification will be due (POC Duration  / 90 days whichever is less): 2022      Visit # Insurance Allowable Auth Required   In Person [address] []  Yes     []  No    Tele Health -  []  Yes     []  No    Total 6       Functional Scale: LEFS: 75% (Score: 20/80)   Date assessed: 10/11/2021      Latex Allergy:  [x]NO      []YES  Preferred Language for Healthcare:   [x]English       []other:    Pain level:  6-7/10 when up and moving       SUBJECTIVE: Patient reports that he is doing better but neuropathy is still present and at times worse than others. OBJECTIVE: See eval   Observation:    Test measurements:  4 degrees from zero, 92 degrees flexion (reiterated importance of HEP)    2 from zero to 120 flexion 10/28/2021       RESTRICTIONS/PRECAUTIONS: Hx HTN, Hyperlipidemia;  Hx stent placment    Exercises/Interventions:   Therapeutic Ex (76146)  Therapeutic Activity (36259)  NMR re-education (06498) Sets/Reps Notes/CUES   Bike 5'  Full revolution today        Slant Board  HR 3 x 30\"  x30 Standing wt shifts 10 x 10\"    Standing HS curls 2 x 10 L only   Standing march x10 L thanh   Leg Press 80# x30 DL  40# x20 SL         Glute sets 10 x 10\"    Quad Sets 15 x 5\"    Hamstring Sets 15 x 2\"    SAQ 3x10    SLR 3 x 10    SL SLR  3 x 10   LAQ  x30    Bridge 3\" x20              Heel Slides with belt & slide board 3\" x20    Heel Prop 2 x 2'         Long Sit Gastroc Stretch 3  x  30\"    Long Sit Hamstring Stretch 3  x  30\"                                                                      Manual Intervention (13058)     Patella Mobs 5'    PROM Knee Flex/Ext 10'         Hamstring stretch 3 x 30\"    STM to hamstrings, IT Band, and Quad 5 min              Medbridge access code:   Access Code: [email or web address]  URL: [email or web address]  Date: 10/11/2021  Prepared by: Miriam Ceja    Exercises  Seated Table Hamstring Stretch - 1 x daily - 7 x weekly - 5 reps - 1 sets - 30\" hold  Long Sitting Calf Stretch with Strap - 1 x daily - 7 x weekly - 5 reps - 1 sets - 30\" hold  Supine Quad Set - 1-2 x daily - 7 x weekly - 1-2 sets - 10 reps - 5\" hold  Supine Short Arc Quad - 1 x daily - 7 x weekly - 10 reps - 3 sets  Active Straight Leg Raise with Quad Set - 1 x daily - 7 x weekly - 3 sets - 10 reps  Seated Knee Flexion AAROM - 1 x daily - 7 x weekly - 1 sets - 10 reps - 10\" hold               Patient Education 10' Pt education with HEP and progression of PT along with compliance with HEP to aide with formal PT for optimal outcomes. Education with proper gait training to improve heel strike and TKE        Therapeutic Exercise and NMR EXR  [x] (94770) Provided verbal/tactile cueing for activities related to strengthening, flexibility, endurance, ROM for improvements in LE, proximal hip, and core control with self care, mobility, lifting, ambulation.   [x] (75547) Provided verbal/tactile cueing for activities related to improving balance, coordination, kinesthetic sense, posture, motor skill, proprioception to assist with LE, proximal hip, and core control in self-care, mobility, lifting, ambulation and eccentric single leg control. NMR and Therapeutic Activities:    [x] (17756 or 19398) Provided verbal/tactile cueing for activities related to improving balance, coordination, kinesthetic sense, posture, motor skill, proprioception and motor activation to allow for proper function of core, proximal hip and LE with self-care and ADLs and functional mobility. [x] (46759) Gait Re-education- Provided training and instruction to the patient for proper LE, core and proximal hip recruitment and positioning and eccentric body weight control with ambulation re-education including up and down stairs     Home Exercise Program:    [x] (50052) Reviewed/Progressed HEP activities related to strengthening, flexibility, endurance, ROM of core, proximal hip and LE for functional self-care, mobility, lifting and ambulation/stair navigation   [x] (04248) Reviewed/Progressed HEP activities related to improving balance, coordination, kinesthetic sense, posture, motor skill, proprioception of core, proximal hip and LE for self-care, mobility, lifting, and ambulation/stair navigation      Manual Treatments:  PROM / STM / Oscillations-Mobs:  G-I, II, III, IV (PA's, Inf., Post.)  [x] (76592) Provided manual therapy to mobilize LE, proximal hip and/or LS spine soft tissue/joints for the purpose of modulating pain, promoting relaxation, increasing ROM, reducing/eliminating soft tissue swelling/inflammation/restriction, improving soft tissue extensibility and allowing for proper ROM for normal function with self-care, mobility, lifting and ambulation.      Modalities:  Vaso x 10' for edema and pain control with   ES/CP 10 min     Charges:  Timed Code Treatment Minutes: 60'   Total Treatment Minutes:  79'   BWC:  TE TIME:  NMR TIME:  MANUAL TIME:  UNTIMED MINUTES:  Medicare Total:    -  -  -  -  Visits (6)  Total: $780      [] EVAL (LOW) 455 1011 (typically 20 minutes face-to-face)  [] EVAL (MOD) 95112 (typically 30 minutes face-to-face)  [] EVAL (HIGH) 61162 (typically 45 minutes face-to-face)  [] RE-EVAL     [x] IP(88846) x 2    [] IONTO  [] NMR (89315) x     [x] VASO  [x] Manual (95065) x 1    [] Other:  [] TA x      [] Mech Traction (28982)  [] ES(attended) (23948)      [x] ES (un) (23111):    ASSESSMENT:  Patient tolerated treatment with less pain today. GOALS:   Short Term Goals: To be achieved in: 2 weeks  1. Independent in HEP and progression per patient tolerance, in order to prevent re-injury. [x] Progressing: [] Met: [] Not Met: [] Adjusted   2. Patient will have a decrease in pain to facilitate improvement in movement, function, and ADLs as indicated by Functional Deficits. [x] Progressing: [] Met: [] Not Met: [] Adjusted     Long Term Goals: To be achieved in: 12 weeks  1. Disability index score of 20% or less for the LEFS to assist with reaching prior level of function. [] Progressing: [] Met: [] Not Met: [] Adjusted   2. Patient will demonstrate increased AROM to equal the opposite side bilaterally to allow for proper joint functioning as indicated by patients Functional Deficits. [] Progressing: [] Met: [] Not Met: [] Adjusted   3. Patient will demonstrate an increase in strength of Left Hip and Knee = Right Hip and Knee to allow for proper functional mobility as indicated by patients Functional Deficits. [] Progressing: [] Met: [] Not Met: [] Adjusted   4. Patient will return to all transfers, work activities, and functional activities without increased symptoms or restriction. [] Progressing: [] Met: [] Not Met: [] Adjusted   5. Patient will have </=1-2/10 pain with ADL's.  [] Progressing: [] Met: [] Not Met: [] Adjusted   6.  Patient stated goal: To return to normal walking and functional activity   [] Progressing: [] Met: [] Not Met: [] Adjusted    Overall Progression Towards Functional goals/ Treatment Progress Update:  [x] Patient is progressing as expected towards functional goals listed. [] Progression is slowed due to complexities/Impairments listed. [] Progression has been slowed due to co-morbidities. [] Plan just implemented, too soon to assess goals progression <30days   [] Goals require adjustment due to lack of progress  [] Patient is not progressing as expected and requires additional follow up with physician  [] Other    Prognosis for POC: [x] Good [] Fair  [] Poor    Patient requires continued skilled intervention: [x] Yes  [] No    Treatment/Activity Tolerance:  [x] Patient able to complete treatment  [] Patient limited by fatigue  [] Patient limited by pain    [] Patient limited by other medical complications  [] Other:     Return to Play: (if applicable)   []  Stage 1: Intro to Strength   []  Stage 2: Return to Run and Strength   []  Stage 3: Return to Jump and Strength   []  Stage 4: Dynamic Strength and Agility   []  Stage 5: Sport Specific Training     []  Ready to Return to Play, Meets All Above Stages   []  Not Ready for Return to Sports   Comments:                         PLAN:   [x] Continue per plan of care [] Alter current plan (see comments above)  [] Plan of care initiated [] Hold pending MD visit [] Discharge    Electronically signed by:  Mehul Perry PTA    Note: If patient does not return for scheduled/ recommended follow up visits, this note will serve as a discharge from care along with most recent update on progress.

## 2021-11-01 ENCOUNTER — HOSPITAL ENCOUNTER (OUTPATIENT)
Dept: PHYSICAL THERAPY | Age: 71
Setting detail: THERAPIES SERIES
Discharge: HOME | End: 2021-11-01
Payer: MEDICARE

## 2021-11-01 ENCOUNTER — TELEPHONE (OUTPATIENT)
Dept: FAMILY MEDICINE CLINIC | Age: 71
End: 2021-11-01

## 2021-11-01 PROCEDURE — 97112 NEUROMUSCULAR REEDUCATION: CPT

## 2021-11-01 PROCEDURE — 97110 THERAPEUTIC EXERCISES: CPT

## 2021-11-01 PROCEDURE — 97140 MANUAL THERAPY 1/> REGIONS: CPT

## 2021-11-01 NOTE — TELEPHONE ENCOUNTER
Patient states his BP is around 105/60-70. He is lightheaded. He has had a 40lb weight loss He currently is on Losartan 50mg daily and Metoprolol 25mg daily. He also has a swollen elbow that is tender to touch, Pinkish color has fluid in a sac.

## 2021-11-01 NOTE — FLOWSHEET NOTE
[facility], [address] [place] [address] [address], [address]  Phone: [phone], [phone]    Physical Therapy Treatment Note/ Progress Report:     Date:  2021    Patient Name:  Nikki Wall    :  1950  MRN: [id number]  Restrictions/Precautions:    Medical/Treatment Diagnosis Information:  · Diagnosis: Left Knee OA s/p Left TKA 10/6/2021  · Treatment Diagnosis: Q98.78  Insurance/Certification information:  PT Insurance Information: Medicare  Physician Information:  Referring Practitioner: Richard Campos  Has the plan of care been signed (Y/N):        []  Yes  [x]  No     Date of Patient follow up with Physician: Oct 21, 2021    Is this a Progress Report:     []  Yes  [x]  No      If Yes:  Date Range for reporting period:  Initial Eval: 10/11/2021  Beginning: 10/11/2021 --- Endin/10/2021    Progress report will be due (10 Rx or 30 days whichever is less): 9658     Recertification will be due (POC Duration  / 90 days whichever is less): 2022      Visit # Insurance Allowable Auth Required   In Person [address] []  Yes     []  No    Tele Health -  []  Yes     []  No    Total 7       Functional Scale: LEFS: 75% (Score: 20/80)   Date assessed: 10/11/2021      Latex Allergy:  [x]NO      []YES  Preferred Language for Healthcare:   [x]English       []other:    Pain level:  6-7/10 when up and moving       SUBJECTIVE: Pt reports feeling tired today    OBJECTIVE: See eval   Observation:    Test measurements:  4 degrees from zero, 92 degrees flexion (reiterated importance of HEP)    2 from zero to 120 flexion 10/28/2021       RESTRICTIONS/PRECAUTIONS: Hx HTN, Hyperlipidemia;  Hx stent placment    Exercises/Interventions:   Therapeutic Ex (45326)  Therapeutic Activity (70595)  NMR re-education (67063) Sets/Reps Notes/CUES   Bike 5'  Full revolution today        Slant Board  HR 3 x 30\"  x30    Standing wt shifts 10 x 10\"    Standing HS curls 2 x 10 L only   Standing march x10 L thanh   Leg Press 80# x30 DL  40# x20 SL         Glute sets 10 x 10\"    Quad Sets 15 x 5\"    Hamstring Sets 15 x 2\"    SAQ 3x10    SLR 3 x 10    SL SLR  3 x 10   LAQ  x30    Bridge 3\" x20              Heel Slides with belt & slide board 3\" x20    Heel Prop 2 x 2'         Long Sit Gastroc Stretch 3  x  30\"    Long Sit Hamstring Stretch 3  x  30\"                                                                      Manual Intervention (96512)     Patella Mobs 5'    PROM Knee Flex/Ext 10'         Hamstring stretch 3 x 30\"    STM to hamstrings, IT Band, and Quad 5 min              Medbridge access code:   Access Code: [email or web address]  URL: [email or web address]  Date: 10/11/2021  Prepared by: Justin Factor    Exercises  Seated Table Hamstring Stretch - 1 x daily - 7 x weekly - 5 reps - 1 sets - 30\" hold  Long Sitting Calf Stretch with Strap - 1 x daily - 7 x weekly - 5 reps - 1 sets - 30\" hold  Supine Quad Set - 1-2 x daily - 7 x weekly - 1-2 sets - 10 reps - 5\" hold  Supine Short Arc Quad - 1 x daily - 7 x weekly - 10 reps - 3 sets  Active Straight Leg Raise with Quad Set - 1 x daily - 7 x weekly - 3 sets - 10 reps  Seated Knee Flexion AAROM - 1 x daily - 7 x weekly - 1 sets - 10 reps - 10\" hold               Patient Education 10' Pt education with HEP and progression of PT along with compliance with HEP to aide with formal PT for optimal outcomes. Education with proper gait training to improve heel strike and TKE        Therapeutic Exercise and NMR EXR  [x] (28725) Provided verbal/tactile cueing for activities related to strengthening, flexibility, endurance, ROM for improvements in LE, proximal hip, and core control with self care, mobility, lifting, ambulation.   [x] (92908) Provided verbal/tactile cueing for activities related to improving balance, coordination, kinesthetic sense, posture, motor skill, proprioception to assist with LE, proximal hip, and core control in self-care, mobility, lifting, ambulation and eccentric single leg control. NMR and Therapeutic Activities:    [x] (03646 or 80583) Provided verbal/tactile cueing for activities related to improving balance, coordination, kinesthetic sense, posture, motor skill, proprioception and motor activation to allow for proper function of core, proximal hip and LE with self-care and ADLs and functional mobility. [x] (40915) Gait Re-education- Provided training and instruction to the patient for proper LE, core and proximal hip recruitment and positioning and eccentric body weight control with ambulation re-education including up and down stairs     Home Exercise Program:    [x] (63688) Reviewed/Progressed HEP activities related to strengthening, flexibility, endurance, ROM of core, proximal hip and LE for functional self-care, mobility, lifting and ambulation/stair navigation   [x] (23374) Reviewed/Progressed HEP activities related to improving balance, coordination, kinesthetic sense, posture, motor skill, proprioception of core, proximal hip and LE for self-care, mobility, lifting, and ambulation/stair navigation      Manual Treatments:  PROM / STM / Oscillations-Mobs:  G-I, II, III, IV (PA's, Inf., Post.)  [x] (99158) Provided manual therapy to mobilize LE, proximal hip and/or LS spine soft tissue/joints for the purpose of modulating pain, promoting relaxation, increasing ROM, reducing/eliminating soft tissue swelling/inflammation/restriction, improving soft tissue extensibility and allowing for proper ROM for normal function with self-care, mobility, lifting and ambulation.      Modalities:  Vaso x 10' for edema and pain control     Charges:  Timed Code Treatment Minutes: 61'   Total Treatment Minutes:  79'   BW:  TE TIME:  NMR TIME:  MANUAL TIME:  UNTIMED MINUTES:  Medicare Total:    -  -  -  -  Visits (6)  Total: $780      [] EVAL (LOW) 63504 (typically 20 minutes face-to-face)  [] EVAL (MOD) 81193 (typically 30 minutes face-to-face)  [] EVAL complexities/Impairments listed. [] Progression has been slowed due to co-morbidities. [] Plan just implemented, too soon to assess goals progression <30days   [] Goals require adjustment due to lack of progress  [] Patient is not progressing as expected and requires additional follow up with physician  [] Other    Prognosis for POC: [x] Good [] Fair  [] Poor    Patient requires continued skilled intervention: [x] Yes  [] No    Treatment/Activity Tolerance:  [x] Patient able to complete treatment  [] Patient limited by fatigue  [] Patient limited by pain    [] Patient limited by other medical complications  [] Other:     Return to Play: (if applicable)   []  Stage 1: Intro to Strength   []  Stage 2: Return to Run and Strength   []  Stage 3: Return to Jump and Strength   []  Stage 4: Dynamic Strength and Agility   []  Stage 5: Sport Specific Training     []  Ready to Return to Play, Meets All Above Stages   []  Not Ready for Return to Sports   Comments:                         PLAN:   [x] Continue per plan of care [] Alter current plan (see comments above)  [] Plan of care initiated [] Hold pending MD visit [] Discharge    Electronically signed by:  Stephanie Zimmer, PT, MPT,ATC    Note: If patient does not return for scheduled/ recommended follow up visits, this note will serve as a discharge from care along with most recent update on progress.

## 2021-11-01 NOTE — TELEPHONE ENCOUNTER
Noted. Please stop losartan and monitor blood pressures and advise us of results. Ace wrap or similar product around elbow will help to eventually reduce swelling.   Please advise us if the elbow area becomes more red warm or tender and he may need an antibiotic

## 2021-11-04 ENCOUNTER — APPOINTMENT (OUTPATIENT)
Dept: PHYSICAL THERAPY | Age: 71
End: 2021-11-04
Payer: MEDICARE

## 2021-11-05 ENCOUNTER — HOSPITAL ENCOUNTER (OUTPATIENT)
Dept: PHYSICAL THERAPY | Age: 71
Setting detail: THERAPIES SERIES
Discharge: HOME | End: 2021-11-05
Payer: MEDICARE

## 2021-11-05 PROCEDURE — 97112 NEUROMUSCULAR REEDUCATION: CPT

## 2021-11-05 PROCEDURE — 97140 MANUAL THERAPY 1/> REGIONS: CPT

## 2021-11-05 PROCEDURE — 97110 THERAPEUTIC EXERCISES: CPT

## 2021-11-05 NOTE — FLOWSHEET NOTE
alternating   Standing march x10 bilat alternating   Leg Press 80# x30 DL  40# x20 SL    FSU 4\" 2x10                   Quad Sets 15 x 5\"    Hamstring Sets     SAQ 3x10    SLR 3 x 10    SL SLR  3 x 10   LAQ  x30    Bridge 3\" x20              Heel Slides with belt & slide board 3\" x20    Heel Prop 2 x 2'         Long Sit Gastroc Stretch 3  x  30\"    Long Sit Hamstring Stretch 3  x  30\"     Seated knee flex stretch 3x30\"                                                                Manual Intervention (37039)     Patella Mobs 5'    PROM Knee Flex/Ext 10'         Hamstring stretch    STM to hamstrings, IT Band, and Quad              Medbridge access code:   Access Code: [email or web address]  URL: [email or web address]  Date: 10/11/2021  Prepared by: Shona Eng    Exercises  Seated Table Hamstring Stretch - 1 x daily - 7 x weekly - 5 reps - 1 sets - 30\" hold  Long Sitting Calf Stretch with Strap - 1 x daily - 7 x weekly - 5 reps - 1 sets - 30\" hold  Supine Quad Set - 1-2 x daily - 7 x weekly - 1-2 sets - 10 reps - 5\" hold  Supine Short Arc Quad - 1 x daily - 7 x weekly - 10 reps - 3 sets  Active Straight Leg Raise with Quad Set - 1 x daily - 7 x weekly - 3 sets - 10 reps  Seated Knee Flexion AAROM - 1 x daily - 7 x weekly - 1 sets - 10 reps - 10\" hold               Patient Education 10' Pt education with HEP and progression of PT along with compliance with HEP to aide with formal PT for optimal outcomes. Education with proper gait training to improve heel strike and TKE        Therapeutic Exercise and NMR EXR  [x] (96557) Provided verbal/tactile cueing for activities related to strengthening, flexibility, endurance, ROM for improvements in LE, proximal hip, and core control with self care, mobility, lifting, ambulation.   [x] (77277) Provided verbal/tactile cueing for activities related to improving balance, coordination, kinesthetic sense, posture, motor skill, proprioception to assist with LE, proximal hip, and core (typically 30 minutes face-to-face)  [] EVAL (HIGH) 14104 (typically 45 minutes face-to-face)  [] RE-EVAL     [x] DJ(00573) x 2    [] IONTO  [x] NMR (59287) x   1  [] VASO  [x] Manual (46084) x 1    [] Other:  [] TA x      [] Mech Traction (79387)  [] ES(attended) (03430)      [] ES (un) (65959):    ASSESSMENT:  Patient had some stiffness today and it took a while to get stretched out. Focused on extension and quad strength to improve TKE. GOALS:   Short Term Goals: To be achieved in: 2 weeks  1. Independent in HEP and progression per patient tolerance, in order to prevent re-injury. [x] Progressing: [] Met: [] Not Met: [] Adjusted   2. Patient will have a decrease in pain to facilitate improvement in movement, function, and ADLs as indicated by Functional Deficits. [x] Progressing: [] Met: [] Not Met: [] Adjusted     Long Term Goals: To be achieved in: 12 weeks  1. Disability index score of 20% or less for the LEFS to assist with reaching prior level of function. [] Progressing: [] Met: [] Not Met: [] Adjusted   2. Patient will demonstrate increased AROM to equal the opposite side bilaterally to allow for proper joint functioning as indicated by patients Functional Deficits. [] Progressing: [] Met: [] Not Met: [] Adjusted   3. Patient will demonstrate an increase in strength of Left Hip and Knee = Right Hip and Knee to allow for proper functional mobility as indicated by patients Functional Deficits. [] Progressing: [] Met: [] Not Met: [] Adjusted   4. Patient will return to all transfers, work activities, and functional activities without increased symptoms or restriction. [] Progressing: [] Met: [] Not Met: [] Adjusted   5. Patient will have </=1-2/10 pain with ADL's.  [] Progressing: [] Met: [] Not Met: [] Adjusted   6.  Patient stated goal: To return to normal walking and functional activity   [] Progressing: [] Met: [] Not Met: [] Adjusted    Overall Progression Towards Functional goals/ Treatment Progress Update:  [x] Patient is progressing as expected towards functional goals listed. [] Progression is slowed due to complexities/Impairments listed. [] Progression has been slowed due to co-morbidities. [] Plan just implemented, too soon to assess goals progression <30days   [] Goals require adjustment due to lack of progress  [] Patient is not progressing as expected and requires additional follow up with physician  [] Other    Prognosis for POC: [x] Good [] Fair  [] Poor    Patient requires continued skilled intervention: [x] Yes  [] No    Treatment/Activity Tolerance:  [x] Patient able to complete treatment  [] Patient limited by fatigue  [] Patient limited by pain    [] Patient limited by other medical complications  [] Other:     Return to Play: (if applicable)   []  Stage 1: Intro to Strength   []  Stage 2: Return to Run and Strength   []  Stage 3: Return to Jump and Strength   []  Stage 4: Dynamic Strength and Agility   []  Stage 5: Sport Specific Training     []  Ready to Return to Play, Meets All Above Stages   []  Not Ready for Return to Sports   Comments:                         PLAN:   [x] Continue per plan of care [] Alter current plan (see comments above)  [] Plan of care initiated [] Hold pending MD visit [] Discharge    Electronically signed by:  Rhea Ayon PT     Note: If patient does not return for scheduled/ recommended follow up visits, this note will serve as a discharge from care along with most recent update on progress.

## 2021-11-08 ENCOUNTER — HOSPITAL ENCOUNTER (OUTPATIENT)
Dept: PHYSICAL THERAPY | Age: 71
Setting detail: THERAPIES SERIES
Discharge: HOME | End: 2021-11-08
Payer: MEDICARE

## 2021-11-08 PROCEDURE — 97112 NEUROMUSCULAR REEDUCATION: CPT | Performed by: PHYSICAL THERAPY ASSISTANT

## 2021-11-08 PROCEDURE — 97140 MANUAL THERAPY 1/> REGIONS: CPT | Performed by: PHYSICAL THERAPY ASSISTANT

## 2021-11-08 PROCEDURE — 97110 THERAPEUTIC EXERCISES: CPT | Performed by: PHYSICAL THERAPY ASSISTANT

## 2021-11-08 NOTE — FLOWSHEET NOTE
approved by physician    If you have any questions or concerns, please don't hesitate to call. Thank you for your referral.    [facility] office  ([phone])     Fax [phone]       Physical Therapy Treatment Note/ Progress Report:     Date:  2021    Patient Name:  Janine Heredia    :  1950  MRN: [id number]  Restrictions/Precautions:    Medical/Treatment Diagnosis Information:  · Diagnosis: Left Knee OA s/p Left TKA 10/6/2021  · Treatment Diagnosis: T89.37  Insurance/Certification information:  PT Insurance Information: Medicare  Physician Information:  Referring Practitioner: Taniya Gupta  Has the plan of care been signed (Y/N):        []  Yes  [x]  No     Date of Patient follow up with Physician: Oct 21, 2021    Is this a Progress Report:     []  Yes  [x]  No      If Yes:  Date Range for reporting period:  Initial Eval: 10/11/2021  Beginning: 10/11/2021 --- Endin/10/2021  Beginnin21 --------- Ending 21    Progress report will be due (10 Rx or 30 days whichever is less): 02/3/5698     Recertification will be due (POC Duration  / 90 days whichever is less): 2022      Visit # Insurance Allowable Auth Required   In Person [address] []  Yes     []  No    Tele Health -  []  Yes     []  No    Total 9       Functional Scale: LEFS: 75% (Score: 20/80)   Date assessed: 10/11/2021   Functional Scale: LEFS: 53% (Score: 38/80)   21     Latex Allergy:  [x]NO      []YES  Preferred Language for Healthcare:   [x]English       []other:    Pain level:  6/10 when up and moving       SUBJECTIVE: See updated progress note. OBJECTIVE: See eval   Observation:    Test measurements:    2 from zero to 117 flexion 2021       RESTRICTIONS/PRECAUTIONS: Hx HTN, Hyperlipidemia;  Hx stent placment    Exercises/Interventions:   Therapeutic Ex (14816)  Therapeutic Activity (22959)  NMR re-education (26396) Sets/Reps Notes/CUES   Bike 5' L3         Slant Board  HR 3 x 30\"  x30 Standing wt shifts 10 x 10\"    Standing HS curls 3 x 10 bilat alternating   Standing march x20 Single arm   Leg Press 80# x30 DL  60# x20 SL    FSU 6\" 2 x 10                   Quad Sets 15 x 5\"    Hamstring Sets     SAQ 3 x 10    SLR 3 x 10    SL SLR  3 x 10   LAQ  x30    Bridge 3\" x20              Heel Slides with belt & slide board 3\" x20    Heel Prop 2 x 2'         Long Sit Gastroc Stretch 3  x  30\"    Long Sit Hamstring Stretch 3  x  30\"     Seated knee flex stretch 3 x 30\"                                                                Manual Intervention (45976)     Patella Mobs 5'    PROM Knee Flex/Ext 10'         Hamstring stretch    STM to hamstrings, IT Band, and Quad              Medbridge access code:   Access Code: [email or web address]  URL: [email or web address]  Date: 10/11/2021  Prepared by: Shasta Au    Exercises  Seated Table Hamstring Stretch - 1 x daily - 7 x weekly - 5 reps - 1 sets - 30\" hold  Long Sitting Calf Stretch with Strap - 1 x daily - 7 x weekly - 5 reps - 1 sets - 30\" hold  Supine Quad Set - 1-2 x daily - 7 x weekly - 1-2 sets - 10 reps - 5\" hold  Supine Short Arc Quad - 1 x daily - 7 x weekly - 10 reps - 3 sets  Active Straight Leg Raise with Quad Set - 1 x daily - 7 x weekly - 3 sets - 10 reps  Seated Knee Flexion AAROM - 1 x daily - 7 x weekly - 1 sets - 10 reps - 10\" hold               Patient Education 10' Pt education with HEP and progression of PT along with compliance with HEP to aide with formal PT for optimal outcomes. Education with proper gait training to improve heel strike and TKE        Therapeutic Exercise and NMR EXR  [x] (36750) Provided verbal/tactile cueing for activities related to strengthening, flexibility, endurance, ROM for improvements in LE, proximal hip, and core control with self care, mobility, lifting, ambulation.   [x] (29471) Provided verbal/tactile cueing for activities related to improving balance, coordination, kinesthetic sense, posture, motor skill, proprioception to assist with LE, proximal hip, and core control in self-care, mobility, lifting, ambulation and eccentric single leg control. NMR and Therapeutic Activities:    [x] (17505 or 50709) Provided verbal/tactile cueing for activities related to improving balance, coordination, kinesthetic sense, posture, motor skill, proprioception and motor activation to allow for proper function of core, proximal hip and LE with self-care and ADLs and functional mobility. [x] (20063) Gait Re-education- Provided training and instruction to the patient for proper LE, core and proximal hip recruitment and positioning and eccentric body weight control with ambulation re-education including up and down stairs     Home Exercise Program:    [x] (45546) Reviewed/Progressed HEP activities related to strengthening, flexibility, endurance, ROM of core, proximal hip and LE for functional self-care, mobility, lifting and ambulation/stair navigation   [x] (13763) Reviewed/Progressed HEP activities related to improving balance, coordination, kinesthetic sense, posture, motor skill, proprioception of core, proximal hip and LE for self-care, mobility, lifting, and ambulation/stair navigation      Manual Treatments:  PROM / STM / Oscillations-Mobs:  G-I, II, III, IV (PA's, Inf., Post.)  [x] (94838) Provided manual therapy to mobilize LE, proximal hip and/or LS spine soft tissue/joints for the purpose of modulating pain, promoting relaxation, increasing ROM, reducing/eliminating soft tissue swelling/inflammation/restriction, improving soft tissue extensibility and allowing for proper ROM for normal function with self-care, mobility, lifting and ambulation.      Modalities:  Vaso x 10' for edema and pain control     Charges:  Timed Code Treatment Minutes: 61'   Total Treatment Minutes:  79'   BWC:  TE TIME:  NMR TIME:  MANUAL TIME:  UNTIMED MINUTES:  Medicare Total:    -  -  -  -  Visits (9)  Total: $1040      [] JENNIFER (LOW) 34043 (typically 20 minutes face-to-face)  [] EVAL (MOD) 51371 (typically 30 minutes face-to-face)  [] EVAL (HIGH) 88641 (typically 45 minutes face-to-face)  [] RE-EVAL     [x] WW(37961) x 2    [] IONTO  [x] NMR (21787) x   1  [] VASO  [x] Manual (24438) x 1    [] Other:  [] TA x      [] Mech Traction (36346)  [] ES(attended) (64379)      [] ES (un) (08380):    ASSESSMENT:  Patient had some stiffness today and it took a while to get stretched out. Focused on extension and quad strength to improve TKE. GOALS:   Short Term Goals: To be achieved in: 2 weeks  1. Independent in HEP and progression per patient tolerance, in order to prevent re-injury. [x] Progressing: [] Met: [] Not Met: [] Adjusted   2. Patient will have a decrease in pain to facilitate improvement in movement, function, and ADLs as indicated by Functional Deficits. [x] Progressing: [] Met: [] Not Met: [] Adjusted     Long Term Goals: To be achieved in: 12 weeks  1. Disability index score of 20% or less for the LEFS to assist with reaching prior level of function. [x] Progressing: [] Met: [] Not Met: [] Adjusted   2. Patient will demonstrate increased AROM to equal the opposite side bilaterally to allow for proper joint functioning as indicated by patients Functional Deficits. [x] Progressing: [] Met: [] Not Met: [] Adjusted   3. Patient will demonstrate an increase in strength of Left Hip and Knee = Right Hip and Knee to allow for proper functional mobility as indicated by patients Functional Deficits. [x] Progressing: [] Met: [] Not Met: [] Adjusted   4. Patient will return to all transfers, work activities, and functional activities without increased symptoms or restriction. [x] Progressing: [] Met: [] Not Met: [] Adjusted   5. Patient will have </=1-2/10 pain with ADL's. [x] Progressing: [] Met: [] Not Met: [] Adjusted   6.  Patient stated goal: To return to normal walking and functional activity   [x] Progressing: [] Met: [] Not Met:

## 2021-11-11 ENCOUNTER — HOSPITAL ENCOUNTER (OUTPATIENT)
Dept: PHYSICAL THERAPY | Age: 71
Setting detail: THERAPIES SERIES
Discharge: HOME | End: 2021-11-11
Payer: MEDICARE

## 2021-11-11 ENCOUNTER — TELEPHONE (OUTPATIENT)
Dept: FAMILY MEDICINE CLINIC | Age: 71
End: 2021-11-11

## 2021-11-11 PROCEDURE — 97110 THERAPEUTIC EXERCISES: CPT | Performed by: PHYSICAL THERAPY ASSISTANT

## 2021-11-11 PROCEDURE — 97140 MANUAL THERAPY 1/> REGIONS: CPT | Performed by: PHYSICAL THERAPY ASSISTANT

## 2021-11-11 PROCEDURE — 97112 NEUROMUSCULAR REEDUCATION: CPT | Performed by: PHYSICAL THERAPY ASSISTANT

## 2021-11-11 NOTE — TELEPHONE ENCOUNTER
Patient had called on 11-1-21. He had been lightheaded. Dr. Jackie Earl had him stop his Losartan 50mg. He called back to day His BP has been 214-609 systolic. He states he feel better. He is not scheduled until 4-1-22 for his next appt.

## 2021-11-11 NOTE — FLOWSHEET NOTE
[facility], [address] [place] [address] Kenia Davila, [address]  Phone: [phone], [phone]      Physical Therapy Treatment Note/ Progress Report:     Date:  2021    Patient Name:  Sheila Maldonado    :  1950  MRN: [id number]  Restrictions/Precautions:    Medical/Treatment Diagnosis Information:  · Diagnosis: Left Knee OA s/p Left TKA 10/6/2021  · Treatment Diagnosis: N07.08  Insurance/Certification information:  PT Insurance Information: Medicare  Physician Information:  Referring Practitioner: Kristina Coelho  Has the plan of care been signed (Y/N):        []  Yes  [x]  No     Date of Patient follow up with Physician: Oct 21, 2021    Is this a Progress Report:     []  Yes  [x]  No      If Yes:  Date Range for reporting period:  Initial Eval: 10/11/2021  Beginning: 10/11/2021 --- Endin/10/2021  Beginnin21 --------- Ending 21    Progress report will be due (10 Rx or 30 days whichever is less):      Recertification will be due (POC Duration  / 90 days whichever is less): 2022      Visit # Insurance Allowable Auth Required   In Person [id number] [facility] []  Yes     []  No    Tele Health -  []  Yes     []  No    Total 10       Functional Scale: LEFS: 75% (Score: 20/80)   Date assessed: 10/11/2021   Functional Scale: LEFS: 53% (Score: 38/80)   21     Latex Allergy:  [x]NO      []YES  Preferred Language for Healthcare:   [x]English       []other:    Pain level:  6/10 when up and moving       SUBJECTIVE: Knee is doing okay - patient states that he has been going to the chiropractor and that makes him sore. OBJECTIVE: See eval   Observation:    Test measurements:    2 from zero to 120 flexion 2021       RESTRICTIONS/PRECAUTIONS: Hx HTN, Hyperlipidemia;  Hx stent placment    Exercises/Interventions:   Therapeutic Ex (21283)  Therapeutic Activity (38953)  NMR re-education (41996) Sets/Reps Notes/CUES   Bike 5' L3         Slant Board  HR 3 x 30\"  x30    Standing wt shifts 10 x 10\"    Standing HS curls 3 x 10 bilat alternating   Standing march x20 Single arm   Leg Press 80# x30 DL  60# x20 SL    FSU 6\" 2 x 10    JUAN TKE  JUAN standing abd 45# x20  45# x20 R, L    HS curls 30# x30 R, L         Quad Sets 15 x 5\"    Hamstring Sets     SAQ 3 x 10    SLR 3 x 10    SL SLR  3 x 10   LAQ  x30    Bridge 3\" x20    HL clams x20 ea 2 way         Heel Slides with belt & slide board 3\" x20    Heel Prop 2 x 2'         Long Sit Gastroc Stretch 3  x  30\"    Long Sit Hamstring Stretch 3  x  30\"     Seated knee flex stretch 3  x 30\"                                                                Manual Intervention (77608)     Patella Mobs 5'    PROM Knee Flex/Ext 10'         Hamstring stretch    STM to hamstrings, IT Band, and Quad              Medbridge access code:   Access Code: [email or web address]  URL: [email or web address]  Date: 10/11/2021  Prepared by: Abad Cr    Exercises  Seated Table Hamstring Stretch - 1 x daily - 7 x weekly - 5 reps - 1 sets - 30\" hold  Long Sitting Calf Stretch with Strap - 1 x daily - 7 x weekly - 5 reps - 1 sets - 30\" hold  Supine Quad Set - 1-2 x daily - 7 x weekly - 1-2 sets - 10 reps - 5\" hold  Supine Short Arc Quad - 1 x daily - 7 x weekly - 10 reps - 3 sets  Active Straight Leg Raise with Quad Set - 1 x daily - 7 x weekly - 3 sets - 10 reps  Seated Knee Flexion AAROM - 1 x daily - 7 x weekly - 1 sets - 10 reps - 10\" hold               Patient Education 10' Pt education with HEP and progression of PT along with compliance with HEP to aide with formal PT for optimal outcomes. Education with proper gait training to improve heel strike and TKE        Therapeutic Exercise and NMR EXR  [x] (77782) Provided verbal/tactile cueing for activities related to strengthening, flexibility, endurance, ROM for improvements in LE, proximal hip, and core control with self care, mobility, lifting, ambulation.   [x] (50125) Provided verbal/tactile cueing for activities related to improving balance, coordination, kinesthetic sense, posture, motor skill, proprioception to assist with LE, proximal hip, and core control in self-care, mobility, lifting, ambulation and eccentric single leg control. NMR and Therapeutic Activities:    [x] (90209 or 83258) Provided verbal/tactile cueing for activities related to improving balance, coordination, kinesthetic sense, posture, motor skill, proprioception and motor activation to allow for proper function of core, proximal hip and LE with self-care and ADLs and functional mobility. [x] (82862) Gait Re-education- Provided training and instruction to the patient for proper LE, core and proximal hip recruitment and positioning and eccentric body weight control with ambulation re-education including up and down stairs     Home Exercise Program:    [x] (32706) Reviewed/Progressed HEP activities related to strengthening, flexibility, endurance, ROM of core, proximal hip and LE for functional self-care, mobility, lifting and ambulation/stair navigation   [x] (73297) Reviewed/Progressed HEP activities related to improving balance, coordination, kinesthetic sense, posture, motor skill, proprioception of core, proximal hip and LE for self-care, mobility, lifting, and ambulation/stair navigation      Manual Treatments:  PROM / STM / Oscillations-Mobs:  G-I, II, III, IV (PA's, Inf., Post.)  [x] (55640) Provided manual therapy to mobilize LE, proximal hip and/or LS spine soft tissue/joints for the purpose of modulating pain, promoting relaxation, increasing ROM, reducing/eliminating soft tissue swelling/inflammation/restriction, improving soft tissue extensibility and allowing for proper ROM for normal function with self-care, mobility, lifting and ambulation.      Modalities:  Vaso x 10' for edema and pain control     Charges:  Timed Code Treatment Minutes: 61'   Total Treatment Minutes:  79'   BWC:  TE TIME:  NMR TIME:  MANUAL stated goal: To return to normal walking and functional activity   [x] Progressing: [] Met: [] Not Met: [] Adjusted    Overall Progression Towards Functional goals/ Treatment Progress Update:  [x] Patient is progressing as expected towards functional goals listed. [] Progression is slowed due to complexities/Impairments listed. [] Progression has been slowed due to co-morbidities. [] Plan just implemented, too soon to assess goals progression <30days   [] Goals require adjustment due to lack of progress  [] Patient is not progressing as expected and requires additional follow up with physician  [] Other    Prognosis for POC: [x] Good [] Fair  [] Poor    Patient requires continued skilled intervention: [x] Yes  [] No    Treatment/Activity Tolerance:  [x] Patient able to complete treatment  [] Patient limited by fatigue  [] Patient limited by pain    [] Patient limited by other medical complications  [] Other:     Return to Play: (if applicable)   []  Stage 1: Intro to Strength   []  Stage 2: Return to Run and Strength   []  Stage 3: Return to Jump and Strength   []  Stage 4: Dynamic Strength and Agility   []  Stage 5: Sport Specific Training     []  Ready to Return to Play, Meets All Above Stages   []  Not Ready for Return to Sports   Comments:                         PLAN:   [x] Continue per plan of care [] Alter current plan (see comments above)  [] Plan of care initiated [] Hold pending MD visit [] Discharge    Electronically signed by:  Lydia Polanco PTA ; Kirit Guerrero PT,MPT,ATC     Note: If patient does not return for scheduled/ recommended follow up visits, this note will serve as a discharge from care along with most recent update on progress.

## 2021-11-16 ENCOUNTER — HOSPITAL ENCOUNTER (OUTPATIENT)
Dept: PHYSICAL THERAPY | Age: 71
Setting detail: THERAPIES SERIES
Discharge: HOME | End: 2021-11-16
Payer: MEDICARE

## 2021-11-16 PROCEDURE — 97112 NEUROMUSCULAR REEDUCATION: CPT | Performed by: PHYSICAL THERAPY ASSISTANT

## 2021-11-16 PROCEDURE — 97140 MANUAL THERAPY 1/> REGIONS: CPT | Performed by: PHYSICAL THERAPY ASSISTANT

## 2021-11-16 PROCEDURE — 97110 THERAPEUTIC EXERCISES: CPT | Performed by: PHYSICAL THERAPY ASSISTANT

## 2021-11-16 NOTE — FLOWSHEET NOTE
[facility], [address] [place] [address] Kenia Davila, [address]  Phone: [phone], [phone]      Physical Therapy Treatment Note/ Progress Report:     Date:  2021    Patient Name:  Sheila Maldonado    :  1950  MRN: [id number]  Restrictions/Precautions:    Medical/Treatment Diagnosis Information:  · Diagnosis: Left Knee OA s/p Left TKA 10/6/2021  · Treatment Diagnosis: F16.09  Insurance/Certification information:  PT Insurance Information: Medicare  Physician Information:  Referring Practitioner: Kristina Coelho  Has the plan of care been signed (Y/N):        []  Yes  [x]  No     Date of Patient follow up with Physician: Dec 2, 2021    Is this a Progress Report:     []  Yes  [x]  No      If Yes:  Date Range for reporting period:  Initial Eval: 10/11/2021  Beginning: 10/11/2021 --- Endin/10/2021  Beginnin21 --------- Ending 21    Progress report will be due (10 Rx or 30 days whichever is less):      Recertification will be due (POC Duration  / 90 days whichever is less): 2022      Visit # Insurance Allowable Auth Required   In Person [address] []  Yes     []  No    Tele Health -  []  Yes     []  No    Total 11       Functional Scale: LEFS: 75% (Score: 20/80)   Date assessed: 10/11/2021   Functional Scale: LEFS: 53% (Score: 38/80)   21     Latex Allergy:  [x]NO      []YES  Preferred Language for Healthcare:   [x]English       []other:    Pain level:  6/10 when up and moving       SUBJECTIVE:  Patient reports that his knee is feeling okay - sciatica still is bothersome but overall he feels that his knee is improving in function. OBJECTIVE: See eval   Observation:    Test measurements:    2 from zero to 120 flexion 2021       RESTRICTIONS/PRECAUTIONS: Hx HTN, Hyperlipidemia;  Hx stent placment    Exercises/Interventions:   Therapeutic Ex (88848)  Therapeutic Activity (10191)  NMR re-education (37534) Sets/Reps Notes/CUES   Bike 5' L3 Slant Board  HR 3 x 30\"  x30    SLB 10 x 10\" Airex   Standing march x20 Single arm   Mini squat x20    Leg Press 80# x30 DL  60# x20 SL    FSU 6\" 2 x 10    JUAN TKE  JUAN standing abd 45# x20  45# x20 R, L    HS curls 40# x30 R, L         Quad Sets 15 x 5\"    Hamstring Sets     SAQ 3# 3 x 10    SLR 3 x 10    SL SLR  3 x 10   LAQ  3# 3 x 10    Bridge 3\" x20    HL clams x20 ea 2 way         Heel Slides with belt & slide board 3\" x20    Heel Prop 2 x 2'         Long Sit Gastroc Stretch 3  x  30\"    Long Sit Hamstring Stretch 3  x  30\"     Seated knee flex stretch 3  x 30\"                                                                Manual Intervention (75463)     Patella Mobs 5'    PROM Knee Flex/Ext 10'         Hamstring stretch    STM to hamstrings, IT Band, and Quad              Medbridge access code:   Access Code: [email or web address]  URL: [email or web address]  Date: 10/11/2021  Prepared by: Jannet Marie    Exercises  Seated Table Hamstring Stretch - 1 x daily - 7 x weekly - 5 reps - 1 sets - 30\" hold  Long Sitting Calf Stretch with Strap - 1 x daily - 7 x weekly - 5 reps - 1 sets - 30\" hold  Supine Quad Set - 1-2 x daily - 7 x weekly - 1-2 sets - 10 reps - 5\" hold  Supine Short Arc Quad - 1 x daily - 7 x weekly - 10 reps - 3 sets  Active Straight Leg Raise with Quad Set - 1 x daily - 7 x weekly - 3 sets - 10 reps  Seated Knee Flexion AAROM - 1 x daily - 7 x weekly - 1 sets - 10 reps - 10\" hold               Patient Education 10' Pt education with HEP and progression of PT along with compliance with HEP to aide with formal PT for optimal outcomes. Education with proper gait training to improve heel strike and TKE        Therapeutic Exercise and NMR EXR  [x] (44978) Provided verbal/tactile cueing for activities related to strengthening, flexibility, endurance, ROM for improvements in LE, proximal hip, and core control with self care, mobility, lifting, ambulation.   [x] (97960) Provided verbal/tactile cueing TIME:  UNTIMED MINUTES:  Medicare Total:    -  -  -  -  Visits (11)  Total: $1300      [] EVAL (LOW) 48150 (typically 20 minutes face-to-face)  [] EVAL (MOD) 48394 (typically 30 minutes face-to-face)  [] EVAL (HIGH) 35692 (typically 45 minutes face-to-face)  [] RE-EVAL     [x] ZK(77853) x 2    [] IONTO  [x] NMR (79652) x   1  [] VASO  [x] Manual (78900) x 1    [] Other:  [] TA x      [] Mech Traction (77898)  [] ES(attended) (17324)      [] ES (un) (86493):    ASSESSMENT:  Patient had some stiffness today and it took a while to get stretched out. Focused on extension and quad strength to improve TKE. GOALS:   Short Term Goals: To be achieved in: 2 weeks  1. Independent in HEP and progression per patient tolerance, in order to prevent re-injury. [x] Progressing: [] Met: [] Not Met: [] Adjusted   2. Patient will have a decrease in pain to facilitate improvement in movement, function, and ADLs as indicated by Functional Deficits. [x] Progressing: [] Met: [] Not Met: [] Adjusted     Long Term Goals: To be achieved in: 12 weeks  1. Disability index score of 20% or less for the LEFS to assist with reaching prior level of function. [x] Progressing: [] Met: [] Not Met: [] Adjusted   2. Patient will demonstrate increased AROM to equal the opposite side bilaterally to allow for proper joint functioning as indicated by patients Functional Deficits. [x] Progressing: [] Met: [] Not Met: [] Adjusted   3. Patient will demonstrate an increase in strength of Left Hip and Knee = Right Hip and Knee to allow for proper functional mobility as indicated by patients Functional Deficits. [x] Progressing: [] Met: [] Not Met: [] Adjusted   4. Patient will return to all transfers, work activities, and functional activities without increased symptoms or restriction. [x] Progressing: [] Met: [] Not Met: [] Adjusted   5. Patient will have </=1-2/10 pain with ADL's. [x] Progressing: [] Met: [] Not Met: [] Adjusted   6.  Patient stated goal: To return to normal walking and functional activity   [x] Progressing: [] Met: [] Not Met: [] Adjusted    Overall Progression Towards Functional goals/ Treatment Progress Update:  [x] Patient is progressing as expected towards functional goals listed. [] Progression is slowed due to complexities/Impairments listed. [] Progression has been slowed due to co-morbidities. [] Plan just implemented, too soon to assess goals progression <30days   [] Goals require adjustment due to lack of progress  [] Patient is not progressing as expected and requires additional follow up with physician  [] Other    Prognosis for POC: [x] Good [] Fair  [] Poor    Patient requires continued skilled intervention: [x] Yes  [] No    Treatment/Activity Tolerance:  [x] Patient able to complete treatment  [] Patient limited by fatigue  [] Patient limited by pain    [] Patient limited by other medical complications  [] Other:     Return to Play: (if applicable)   []  Stage 1: Intro to Strength   []  Stage 2: Return to Run and Strength   []  Stage 3: Return to Jump and Strength   []  Stage 4: Dynamic Strength and Agility   []  Stage 5: Sport Specific Training     []  Ready to Return to Play, Meets All Above Stages   []  Not Ready for Return to Sports   Comments:                         PLAN:   [x] Continue per plan of care [] Alter current plan (see comments above)  [] Plan of care initiated [] Hold pending MD visit [] Discharge    Electronically signed by:  Aubrey Hyman PTA ; Michelina Mcardle PT,MPT,ATC     Note: If patient does not return for scheduled/ recommended follow up visits, this note will serve as a discharge from care along with most recent update on progress.

## 2021-11-19 ENCOUNTER — HOSPITAL ENCOUNTER (OUTPATIENT)
Dept: PHYSICAL THERAPY | Age: 71
Setting detail: THERAPIES SERIES
Discharge: HOME | End: 2021-11-19
Payer: MEDICARE

## 2021-11-19 PROCEDURE — 97112 NEUROMUSCULAR REEDUCATION: CPT | Performed by: PHYSICAL THERAPY ASSISTANT

## 2021-11-19 PROCEDURE — 97140 MANUAL THERAPY 1/> REGIONS: CPT | Performed by: PHYSICAL THERAPY ASSISTANT

## 2021-11-19 PROCEDURE — 97110 THERAPEUTIC EXERCISES: CPT | Performed by: PHYSICAL THERAPY ASSISTANT

## 2021-11-19 NOTE — FLOWSHEET NOTE
[facility], [address] [place] [address] [place]ila, [address]  Phone: [phone], [phone]      Physical Therapy Treatment Note/ Progress Report:     Date:  2021    Patient Name:  Con Gitelman    :  1950  MRN: [id number]  Restrictions/Precautions:    Medical/Treatment Diagnosis Information:  · Diagnosis: Left Knee OA s/p Left TKA 10/6/2021  · Treatment Diagnosis: M63.74  Insurance/Certification information:  PT Insurance Information: Medicare  Physician Information:  Referring Practitioner: Almaz Gongora  Has the plan of care been signed (Y/N):        []  Yes  [x]  No     Date of Patient follow up with Physician: Dec 2, 2021    Is this a Progress Report:     []  Yes  [x]  No      If Yes:  Date Range for reporting period:  Initial Eval: 10/11/2021  Beginning: 10/11/2021 --- Endin/10/2021  Beginnin21 --------- Ending 21    Progress report will be due (10 Rx or 30 days whichever is less):      Recertification will be due (POC Duration  / 90 days whichever is less): 2022      Visit # Insurance Allowable Auth Required   In Person [address] []  Yes     []  No    [facility] -  []  Yes     []  No    Total 12       Functional Scale: LEFS: 75% (Score: 20/80)   Date assessed: 10/11/2021   Functional Scale: LEFS: 53% (Score: 38/80)   21     Latex Allergy:  [x]NO      []YES  Preferred Language for Healthcare:   [x]English       []other:    Pain level:  6/10 when up and moving       SUBJECTIVE:  Sat this morning at a presentation for his granddaughter and was in bleachers so he is sore. Overall he feels improved although is frustrated that he is still having pain. DIscussed that at 3 months he should begin to feel improved with pain levels. OBJECTIVE: See eval   Observation:    Test measurements:    2 from zero to 120 flexion 2021   2 from zero to 119 flexion 2021       RESTRICTIONS/PRECAUTIONS: Hx HTN, Hyperlipidemia;  Hx stent placment    Exercises/Interventions:   Therapeutic Ex (21501)  Therapeutic Activity (94339)  NMR re-education (28103) Sets/Reps Notes/CUES   Bike 5' L3         Slant Board  HR 3 x 30\"  x30    SLB 10 x 10\" Airex   Standing march x20 Single arm   Mini squat x20    Leg Press 80# x30 DL  60# x20 SL    FSU 6\" 2 x 10    JUAN TKE  JUAN standing abd 45# x20  45# x20 R, L    HS curls 40# x30 R, L         Quad Sets 15 x 5\"    Hamstring Sets     SAQ 3# 3 x 10    SLR 3 x 10    SL SLR  3 x 10   LAQ  3# 3 x 10    Bridge 3\" x20    HL clams x20 ea 2 way         Heel Slides with belt & slide board 3\" x20    Heel Prop 2 x 2'         Long Sit Gastroc Stretch 3  x  30\"    Long Sit Hamstring Stretch 3  x  30\"     Seated knee flex stretch 3  x 30\"                                                                Manual Intervention (22770)     Patella Mobs 5'    PROM Knee Flex/Ext 10'         Hamstring stretch    STM to hamstrings, IT Band, and Quad              Medbridge access code:   Access Code: [email or web address]  URL: [email or web address]  Date: 10/11/2021  Prepared by: Quinn Melendrez    Exercises  Seated Table Hamstring Stretch - 1 x daily - 7 x weekly - 5 reps - 1 sets - 30\" hold  Long Sitting Calf Stretch with Strap - 1 x daily - 7 x weekly - 5 reps - 1 sets - 30\" hold  Supine Quad Set - 1-2 x daily - 7 x weekly - 1-2 sets - 10 reps - 5\" hold  Supine Short Arc Quad - 1 x daily - 7 x weekly - 10 reps - 3 sets  Active Straight Leg Raise with Quad Set - 1 x daily - 7 x weekly - 3 sets - 10 reps  Seated Knee Flexion AAROM - 1 x daily - 7 x weekly - 1 sets - 10 reps - 10\" hold               Patient Education 10' Pt education with HEP and progression of PT along with compliance with HEP to aide with formal PT for optimal outcomes.  Education with proper gait training to improve heel strike and TKE        Therapeutic Exercise and NMR EXR  [x] (52571) Provided verbal/tactile cueing for activities related to strengthening, flexibility, endurance, ROM for improvements in LE, proximal hip, and core control with self care, mobility, lifting, ambulation. [x] (81253) Provided verbal/tactile cueing for activities related to improving balance, coordination, kinesthetic sense, posture, motor skill, proprioception to assist with LE, proximal hip, and core control in self-care, mobility, lifting, ambulation and eccentric single leg control. NMR and Therapeutic Activities:    [x] (44128 or 31096) Provided verbal/tactile cueing for activities related to improving balance, coordination, kinesthetic sense, posture, motor skill, proprioception and motor activation to allow for proper function of core, proximal hip and LE with self-care and ADLs and functional mobility. [x] (91331) Gait Re-education- Provided training and instruction to the patient for proper LE, core and proximal hip recruitment and positioning and eccentric body weight control with ambulation re-education including up and down stairs     Home Exercise Program:    [x] (40324) Reviewed/Progressed HEP activities related to strengthening, flexibility, endurance, ROM of core, proximal hip and LE for functional self-care, mobility, lifting and ambulation/stair navigation   [x] (13216) Reviewed/Progressed HEP activities related to improving balance, coordination, kinesthetic sense, posture, motor skill, proprioception of core, proximal hip and LE for self-care, mobility, lifting, and ambulation/stair navigation      Manual Treatments:  PROM / STM / Oscillations-Mobs:  G-I, II, III, IV (PA's, Inf., Post.)  [x] (23873) Provided manual therapy to mobilize LE, proximal hip and/or LS spine soft tissue/joints for the purpose of modulating pain, promoting relaxation, increasing ROM, reducing/eliminating soft tissue swelling/inflammation/restriction, improving soft tissue extensibility and allowing for proper ROM for normal function with self-care, mobility, lifting and ambulation.      Modalities: Charges:  Timed Code Treatment Minutes: 61'   Total Treatment Minutes:  79'   BWC:  TE TIME:  NMR TIME:  MANUAL TIME:  UNTIMED MINUTES:  Medicare Total:    -  -  -  -  Visits (12)  Total: $1430      [] EVAL (LOW) 73566 (typically 20 minutes face-to-face)  [] EVAL (MOD) 15241 (typically 30 minutes face-to-face)  [] EVAL (HIGH) 34018 (typically 45 minutes face-to-face)  [] RE-EVAL     [x] CX(23717) x 2    [] IONTO  [x] NMR (48093) x   1  [] VASO  [x] Manual (07351) x 1    [] Other:  [] TA x      [] Mech Traction (77049)  [] ES(attended) (12803)      [] ES (un) (56241):    ASSESSMENT:  Patient had some stiffness today and it took a while to get stretched out. Focused on extension and quad strength to improve TKE. GOALS:   Short Term Goals: To be achieved in: 2 weeks  1. Independent in HEP and progression per patient tolerance, in order to prevent re-injury. [x] Progressing: [] Met: [] Not Met: [] Adjusted   2. Patient will have a decrease in pain to facilitate improvement in movement, function, and ADLs as indicated by Functional Deficits. [x] Progressing: [] Met: [] Not Met: [] Adjusted     Long Term Goals: To be achieved in: 12 weeks  1. Disability index score of 20% or less for the LEFS to assist with reaching prior level of function. [x] Progressing: [] Met: [] Not Met: [] Adjusted   2. Patient will demonstrate increased AROM to equal the opposite side bilaterally to allow for proper joint functioning as indicated by patients Functional Deficits. [x] Progressing: [] Met: [] Not Met: [] Adjusted   3. Patient will demonstrate an increase in strength of Left Hip and Knee = Right Hip and Knee to allow for proper functional mobility as indicated by patients Functional Deficits. [x] Progressing: [] Met: [] Not Met: [] Adjusted   4. Patient will return to all transfers, work activities, and functional activities without increased symptoms or restriction.    [x] Progressing: [] Met: [] Not Met: [] Adjusted   5. Patient will have </=1-2/10 pain with ADL's. [x] Progressing: [] Met: [] Not Met: [] Adjusted   6. Patient stated goal: To return to normal walking and functional activity   [x] Progressing: [] Met: [] Not Met: [] Adjusted    Overall Progression Towards Functional goals/ Treatment Progress Update:  [x] Patient is progressing as expected towards functional goals listed. [] Progression is slowed due to complexities/Impairments listed. [] Progression has been slowed due to co-morbidities. [] Plan just implemented, too soon to assess goals progression <30days   [] Goals require adjustment due to lack of progress  [] Patient is not progressing as expected and requires additional follow up with physician  [] Other    Prognosis for POC: [x] Good [] Fair  [] Poor    Patient requires continued skilled intervention: [x] Yes  [] No    Treatment/Activity Tolerance:  [x] Patient able to complete treatment  [] Patient limited by fatigue  [] Patient limited by pain    [] Patient limited by other medical complications  [] Other:     Return to Play: (if applicable)   []  Stage 1: Intro to Strength   []  Stage 2: Return to Run and Strength   []  Stage 3: Return to Jump and Strength   []  Stage 4: Dynamic Strength and Agility   []  Stage 5: Sport Specific Training     []  Ready to Return to Play, Meets All Above Stages   []  Not Ready for Return to Sports   Comments:                         PLAN:   [x] Continue per plan of care [] Alter current plan (see comments above)  [] Plan of care initiated [] Hold pending MD visit [] Discharge    Electronically signed by:  Doron Almonte, PTA, PTA ; Zaida Phillips PT,MPT,ATC     Note: If patient does not return for scheduled/ recommended follow up visits, this note will serve as a discharge from care along with most recent update on progress.

## 2021-11-22 ENCOUNTER — HOSPITAL ENCOUNTER (OUTPATIENT)
Dept: PHYSICAL THERAPY | Age: 71
Setting detail: THERAPIES SERIES
Discharge: HOME | End: 2021-11-22
Payer: MEDICARE

## 2021-11-22 PROCEDURE — 97112 NEUROMUSCULAR REEDUCATION: CPT | Performed by: PHYSICAL THERAPY ASSISTANT

## 2021-11-22 PROCEDURE — 97140 MANUAL THERAPY 1/> REGIONS: CPT | Performed by: PHYSICAL THERAPY ASSISTANT

## 2021-11-22 PROCEDURE — 97110 THERAPEUTIC EXERCISES: CPT | Performed by: PHYSICAL THERAPY ASSISTANT

## 2021-11-22 NOTE — FLOWSHEET NOTE
[facility], [address] [place] [address] [place], [address]  Phone: [phone], [phone]      Physical Therapy Treatment Note/ Progress Report:     Date:  2021    Patient Name:  Vu Acosta    :  1950  MRN: [id number]  Restrictions/Precautions:    Medical/Treatment Diagnosis Information:  · Diagnosis: Left Knee OA s/p Left TKA 10/6/2021  · Treatment Diagnosis: H66.41  Insurance/Certification information:  PT Insurance Information: Medicare  Physician Information:  Referring Practitioner: Naheed Otto  Has the plan of care been signed (Y/N):        []  Yes  [x]  No     Date of Patient follow up with Physician: Dec 2, 2021    Is this a Progress Report:     []  Yes  [x]  No      If Yes:  Date Range for reporting period:  Initial Eval: 10/11/2021  Beginning: 10/11/2021 --- Endin/10/2021  Beginnin21 --------- Ending 21    Progress report will be due (10 Rx or 30 days whichever is less): 5826     Recertification will be due (POC Duration  / 90 days whichever is less): 2022      Visit # Insurance Allowable Auth Required   In Person [address] []  Yes     []  No    [facility] -  []  Yes     []  No    Total 13       Functional Scale: LEFS: 75% (Score: 20/80)   Date assessed: 10/11/2021   Functional Scale: LEFS: 53% (Score: 38/80)   21     Latex Allergy:  [x]NO      []YES  Preferred Language for Healthcare:   [x]English       []other:    Pain level:  6/10 when up and moving       SUBJECTIVE:  Doing fairly well today - feels he had a few days with more range of motion and at other times felt stiff. OBJECTIVE: See eval   Observation:    Test measurements:    2 from zero to 120 flexion 2021   2 from zero to 119 flexion 2021       RESTRICTIONS/PRECAUTIONS: Hx HTN, Hyperlipidemia;  Hx stent placment    Exercises/Interventions:   Therapeutic Ex (92991)  Therapeutic Activity (81095)  NMR re-education (46244) Sets/Reps Notes/CUES Bike 5' L3         Slant Board  HR 3 x 30\"  x30    SLB 10 x 10\" Airex   Standing march x30 Single arm   Mini squat x20    Leg Press 80# x30 DL  60# x20 SL    FSU 6\" 2 x 10    JUAN TKE  JUAN standing abd 45# x20  45# x20 R, L    HS curls  Knee extension 40# x30 R, L  10# x20         Quad Sets 15 x 5\"    Hamstring Sets     SAQ 3# 3 x 10    SLR 3 x 10    SL SLR  3 x 10   LAQ  3# 3 x 10    Bridge 3\" x20    HL clams x20 ea 2 way Green        Heel Slides with belt & slide board 3\" x20    Heel Prop 2 x 2'         Long Sit Gastroc Stretch 3  x  30\"    Long Sit Hamstring Stretch 3  x  30\"     Seated knee flex stretch 3  x 30\"                                                                Manual Intervention (94237)     Patella Mobs 5'    PROM Knee Flex/Ext 10'         Hamstring stretch    STM to hamstrings, IT Band, and Quad              Medbridge access code:   Access Code: [email or web address]  URL: [email or web address]  Date: 10/11/2021  Prepared by: Shona Eng    Exercises  Seated Table Hamstring Stretch - 1 x daily - 7 x weekly - 5 reps - 1 sets - 30\" hold  Long Sitting Calf Stretch with Strap - 1 x daily - 7 x weekly - 5 reps - 1 sets - 30\" hold  Supine Quad Set - 1-2 x daily - 7 x weekly - 1-2 sets - 10 reps - 5\" hold  Supine Short Arc Quad - 1 x daily - 7 x weekly - 10 reps - 3 sets  Active Straight Leg Raise with Quad Set - 1 x daily - 7 x weekly - 3 sets - 10 reps  Seated Knee Flexion AAROM - 1 x daily - 7 x weekly - 1 sets - 10 reps - 10\" hold               Patient Education 10' Pt education with HEP and progression of PT along with compliance with HEP to aide with formal PT for optimal outcomes.  Education with proper gait training to improve heel strike and TKE        Therapeutic Exercise and NMR EXR  [x] (04555) Provided verbal/tactile cueing for activities related to strengthening, flexibility, endurance, ROM for improvements in LE, proximal hip, and core control with self care, mobility, lifting, ambulation. [x] (97360) Provided verbal/tactile cueing for activities related to improving balance, coordination, kinesthetic sense, posture, motor skill, proprioception to assist with LE, proximal hip, and core control in self-care, mobility, lifting, ambulation and eccentric single leg control. NMR and Therapeutic Activities:    [x] (90020 or 97593) Provided verbal/tactile cueing for activities related to improving balance, coordination, kinesthetic sense, posture, motor skill, proprioception and motor activation to allow for proper function of core, proximal hip and LE with self-care and ADLs and functional mobility. [x] (24599) Gait Re-education- Provided training and instruction to the patient for proper LE, core and proximal hip recruitment and positioning and eccentric body weight control with ambulation re-education including up and down stairs     Home Exercise Program:    [x] (97355) Reviewed/Progressed HEP activities related to strengthening, flexibility, endurance, ROM of core, proximal hip and LE for functional self-care, mobility, lifting and ambulation/stair navigation   [x] (19674) Reviewed/Progressed HEP activities related to improving balance, coordination, kinesthetic sense, posture, motor skill, proprioception of core, proximal hip and LE for self-care, mobility, lifting, and ambulation/stair navigation      Manual Treatments:  PROM / STM / Oscillations-Mobs:  G-I, II, III, IV (PA's, Inf., Post.)  [x] (62482) Provided manual therapy to mobilize LE, proximal hip and/or LS spine soft tissue/joints for the purpose of modulating pain, promoting relaxation, increasing ROM, reducing/eliminating soft tissue swelling/inflammation/restriction, improving soft tissue extensibility and allowing for proper ROM for normal function with self-care, mobility, lifting and ambulation.      Modalities:       Charges:  Timed Code Treatment Minutes: 61'   Total Treatment Minutes:  79'   BWC:  TE TIME:  NMR TIME:  MANUAL TIME:  UNTIMED MINUTES:  Medicare Total:    -  -  -  -  Visits (13)  Total: $1560      [] EVAL (LOW) 67198 (typically 20 minutes face-to-face)  [] EVAL (MOD) 25611 (typically 30 minutes face-to-face)  [] EVAL (HIGH) 28539 (typically 45 minutes face-to-face)  [] RE-EVAL     [x] RT(39777) x 2    [] IONTO  [x] NMR (47978) x   1  [] VASO  [x] Manual (48766) x 1    [] Other:  [] TA x      [] Mech Traction (13534)  [] ES(attended) (75569)      [] ES (un) (13343):    ASSESSMENT:  Patient had some stiffness today and it took a while to get stretched out. Focused on extension and quad strength to improve TKE. GOALS:   Short Term Goals: To be achieved in: 2 weeks  1. Independent in HEP and progression per patient tolerance, in order to prevent re-injury. [x] Progressing: [] Met: [] Not Met: [] Adjusted   2. Patient will have a decrease in pain to facilitate improvement in movement, function, and ADLs as indicated by Functional Deficits. [x] Progressing: [] Met: [] Not Met: [] Adjusted     Long Term Goals: To be achieved in: 12 weeks  1. Disability index score of 20% or less for the LEFS to assist with reaching prior level of function. [x] Progressing: [] Met: [] Not Met: [] Adjusted   2. Patient will demonstrate increased AROM to equal the opposite side bilaterally to allow for proper joint functioning as indicated by patients Functional Deficits. [x] Progressing: [] Met: [] Not Met: [] Adjusted   3. Patient will demonstrate an increase in strength of Left Hip and Knee = Right Hip and Knee to allow for proper functional mobility as indicated by patients Functional Deficits. [x] Progressing: [] Met: [] Not Met: [] Adjusted   4. Patient will return to all transfers, work activities, and functional activities without increased symptoms or restriction. [x] Progressing: [] Met: [] Not Met: [] Adjusted   5. Patient will have </=1-2/10 pain with ADL's.   [x] Progressing: [] Met: [] Not Met: [] Adjusted   6. Patient stated goal: To return to normal walking and functional activity   [x] Progressing: [] Met: [] Not Met: [] Adjusted    Overall Progression Towards Functional goals/ Treatment Progress Update:  [x] Patient is progressing as expected towards functional goals listed. [] Progression is slowed due to complexities/Impairments listed. [] Progression has been slowed due to co-morbidities. [] Plan just implemented, too soon to assess goals progression <30days   [] Goals require adjustment due to lack of progress  [] Patient is not progressing as expected and requires additional follow up with physician  [] Other    Prognosis for POC: [x] Good [] Fair  [] Poor    Patient requires continued skilled intervention: [x] Yes  [] No    Treatment/Activity Tolerance:  [x] Patient able to complete treatment  [] Patient limited by fatigue  [] Patient limited by pain    [] Patient limited by other medical complications  [] Other:     Return to Play: (if applicable)   []  Stage 1: Intro to Strength   []  Stage 2: Return to Run and Strength   []  Stage 3: Return to Jump and Strength   []  Stage 4: Dynamic Strength and Agility   []  Stage 5: Sport Specific Training     []  Ready to Return to Play, Meets All Above Stages   []  Not Ready for Return to Sports   Comments:                         PLAN:   [x] Continue per plan of care [] Alter current plan (see comments above)  [] Plan of care initiated [] Hold pending MD visit [] Discharge    Electronically signed by:  Lydia Polanco, PTA ; Kirit Guerrero PT,MPT,ATC     Note: If patient does not return for scheduled/ recommended follow up visits, this note will serve as a discharge from care along with most recent update on progress.

## 2021-11-29 ENCOUNTER — HOSPITAL ENCOUNTER (OUTPATIENT)
Dept: PHYSICAL THERAPY | Age: 71
Setting detail: THERAPIES SERIES
Discharge: HOME | End: 2021-11-29
Payer: MEDICARE

## 2021-11-29 PROCEDURE — 97110 THERAPEUTIC EXERCISES: CPT

## 2021-11-29 PROCEDURE — 97112 NEUROMUSCULAR REEDUCATION: CPT

## 2021-11-29 PROCEDURE — 97140 MANUAL THERAPY 1/> REGIONS: CPT

## 2021-11-29 NOTE — FLOWSHEET NOTE
Formerly [facility], [address] [place] [address] [address], [address]  Phone: [phone], [phone]      Physical Therapy Treatment Note/ Progress Report:     Date:  2021    Patient Name:  Nitza Currie    :  1950  MRN: [id number]  Restrictions/Precautions:    Medical/Treatment Diagnosis Information:  · Diagnosis: Left Knee OA s/p Left TKA 10/6/2021  · Treatment Diagnosis: B08.83  Insurance/Certification information:  PT Insurance Information: Medicare  Physician Information:  Referring Practitioner: Antonio Chavira  Has the plan of care been signed (Y/N):        []  Yes  [x]  No     Date of Patient follow up with Physician: Dec 2, 2021    Is this a Progress Report:     []  Yes  [x]  No      If Yes:  Date Range for reporting period:  Initial Eval: 10/11/2021  Beginning: 10/11/2021 --- Endin/10/2021  Beginnin21 --------- Ending 21    Progress report will be due (10 Rx or 30 days whichever is less): 94/3/6714     Recertification will be due (POC Duration  / 90 days whichever is less): 2022      Visit # Insurance Allowable Auth Required   In Person Garett Lowe [address] []  Yes     []  No    Tele Health -  []  Yes     []  No    Total 14       Functional Scale: LEFS: 75% (Score: 20/80)   Date assessed: 10/11/2021   Functional Scale: LEFS: 53% (Score: 38/80)   Date assessed: 21     Latex Allergy:  [x]NO      []YES  Preferred Language for Healthcare:   [x]English       []other:    Pain level:  610 when up and moving       SUBJECTIVE:  States he feels stiff but was on his feet a lot this weekend with an auction  OBJECTIVE: See eval   Observation:    Test measurements:    2 from zero to 120 flexion 2021   2 from zero to 119 flexion 2021  PROM 2 °  from zero to 120 °  flexion 2021; AROM 2 °  from zero to 125 °     RESTRICTIONS/PRECAUTIONS: Hx HTN, Hyperlipidemia;  Hx stent placment    Exercises/Interventions:   Therapeutic Ex (39062)  Therapeutic Activity (05945)  NMR re-education (11115) Sets/Reps Notes/CUES   Bike 5' L3         Slant Board  HR 3 x 30\"  x30    SLB 10 x 10\" Airex   Standing march x30 Single arm   Mini squat x20    Leg Press 100# x30 DL  80# x20 SL  80# 10 x 10\" Hover    Lateral Step Up 15 x ea 6\"   FSU 6\" 2 x 10    JUAN TKE  JUAN standing abd 60# x20  45# x20 R, L    HS curls  Knee extension 40# x30 R, L  10# x20         Quad Sets 15 x 5\"    Hamstring Sets     SAQ 3# 3 x 10    SLR 3 x 10    SL SLR  3 x 10   LAQ  3# 3 x 10    Bridge 3\" x20    HL clams x20 ea 2 way Green        Heel Slides with belt & slide board 3\" x20    Heel Prop 2 x 2'         Long Sit Gastroc Stretch 3  x  30\"    Long Sit Hamstring Stretch 3  x  30\"     Seated knee flex stretch 3  x 30\"                                                                Manual Intervention (09629)     Patella Mobs 5'    PROM Knee Flex/Ext 10'                         Medbridge access code:   Access Code: [email or web address]  URL: [email or web address]  Date: 10/11/2021  Prepared by: Kirit Guerrero    Exercises  Seated Table Hamstring Stretch - 1 x daily - 7 x weekly - 5 reps - 1 sets - 30\" hold  Long Sitting Calf Stretch with Strap - 1 x daily - 7 x weekly - 5 reps - 1 sets - 30\" hold  Supine Quad Set - 1-2 x daily - 7 x weekly - 1-2 sets - 10 reps - 5\" hold  Supine Short Arc Quad - 1 x daily - 7 x weekly - 10 reps - 3 sets  Active Straight Leg Raise with Quad Set - 1 x daily - 7 x weekly - 3 sets - 10 reps  Seated Knee Flexion AAROM - 1 x daily - 7 x weekly - 1 sets - 10 reps - 10\" hold               Patient Education 10' Pt education with HEP and progression of PT along with compliance with HEP to aide with formal PT for optimal outcomes.  Education with proper gait training to improve heel strike and TKE        Therapeutic Exercise and NMR EXR  [x] (97028) Provided verbal/tactile cueing for activities related to strengthening, flexibility, endurance, ROM for improvements in LE, proximal hip, and core control with self care, mobility, lifting, ambulation. [x] (95199) Provided verbal/tactile cueing for activities related to improving balance, coordination, kinesthetic sense, posture, motor skill, proprioception to assist with LE, proximal hip, and core control in self-care, mobility, lifting, ambulation and eccentric single leg control. NMR and Therapeutic Activities:    [x] (80444 or 06353) Provided verbal/tactile cueing for activities related to improving balance, coordination, kinesthetic sense, posture, motor skill, proprioception and motor activation to allow for proper function of core, proximal hip and LE with self-care and ADLs and functional mobility. [x] (84401) Gait Re-education- Provided training and instruction to the patient for proper LE, core and proximal hip recruitment and positioning and eccentric body weight control with ambulation re-education including up and down stairs     Home Exercise Program:    [x] (77462) Reviewed/Progressed HEP activities related to strengthening, flexibility, endurance, ROM of core, proximal hip and LE for functional self-care, mobility, lifting and ambulation/stair navigation   [x] (85598) Reviewed/Progressed HEP activities related to improving balance, coordination, kinesthetic sense, posture, motor skill, proprioception of core, proximal hip and LE for self-care, mobility, lifting, and ambulation/stair navigation      Manual Treatments:  PROM / STM / Oscillations-Mobs:  G-I, II, III, IV (PA's, Inf., Post.)  [x] (82424) Provided manual therapy to mobilize LE, proximal hip and/or LS spine soft tissue/joints for the purpose of modulating pain, promoting relaxation, increasing ROM, reducing/eliminating soft tissue swelling/inflammation/restriction, improving soft tissue extensibility and allowing for proper ROM for normal function with self-care, mobility, lifting and ambulation.      Modalities:       Charges:  Timed Code Treatment Minutes: 61'   Total Treatment Minutes:  79'   BWC:  TE TIME:  NMR TIME:  MANUAL TIME:  UNTIMED MINUTES:  Medicare Total:    -  -  -  -  Visits (14)  Total: $1260      [] EVAL (LOW) 07749 (typically 20 minutes face-to-face)  [] EVAL (MOD) 68976 (typically 30 minutes face-to-face)  [] EVAL (HIGH) 20103 (typically 45 minutes face-to-face)  [] RE-EVAL     [x] NB(14125) x 1    [] IONTO  [x] NMR (17526) x   1  [] VASO  [x] Manual (96206) x 1    [] Other:  [] TA x      [] Mech Traction (09729)  [] ES(attended) (34716)      [] ES (un) (25131):    ASSESSMENT:  Patient had some stiffness today and it took a while to get stretched out. Focused on extension and quad strength to improve TKE. GOALS:   Short Term Goals: To be achieved in: 2 weeks  1. Independent in HEP and progression per patient tolerance, in order to prevent re-injury. [x] Progressing: [] Met: [] Not Met: [] Adjusted   2. Patient will have a decrease in pain to facilitate improvement in movement, function, and ADLs as indicated by Functional Deficits. [x] Progressing: [] Met: [] Not Met: [] Adjusted     Long Term Goals: To be achieved in: 12 weeks  1. Disability index score of 20% or less for the LEFS to assist with reaching prior level of function. [x] Progressing: [] Met: [] Not Met: [] Adjusted   2. Patient will demonstrate increased AROM to equal the opposite side bilaterally to allow for proper joint functioning as indicated by patients Functional Deficits. [x] Progressing: [] Met: [] Not Met: [] Adjusted   3. Patient will demonstrate an increase in strength of Left Hip and Knee = Right Hip and Knee to allow for proper functional mobility as indicated by patients Functional Deficits. [x] Progressing: [] Met: [] Not Met: [] Adjusted   4. Patient will return to all transfers, work activities, and functional activities without increased symptoms or restriction. [x] Progressing: [] Met: [] Not Met: [] Adjusted   5. Patient will have </=1-2/10 pain with ADL's.   [x] Progressing: [] Met: [] Not Met: [] Adjusted   6. Patient stated goal: To return to normal walking and functional activity   [x] Progressing: [] Met: [] Not Met: [] Adjusted    Overall Progression Towards Functional goals/ Treatment Progress Update:  [x] Patient is progressing as expected towards functional goals listed. [] Progression is slowed due to complexities/Impairments listed. [] Progression has been slowed due to co-morbidities. [] Plan just implemented, too soon to assess goals progression <30days   [] Goals require adjustment due to lack of progress  [] Patient is not progressing as expected and requires additional follow up with physician  [] Other    Prognosis for POC: [x] Good [] Fair  [] Poor    Patient requires continued skilled intervention: [x] Yes  [] No    Treatment/Activity Tolerance:  [x] Patient able to complete treatment  [] Patient limited by fatigue  [] Patient limited by pain    [] Patient limited by other medical complications  [] Other:     Return to Play: (if applicable)   []  Stage 1: Intro to Strength   []  Stage 2: Return to Run and Strength   []  Stage 3: Return to Jump and Strength   []  Stage 4: Dynamic Strength and Agility   []  Stage 5: Sport Specific Training     []  Ready to Return to Play, Meets All Above Stages   []  Not Ready for Return to Sports   Comments:                         PLAN:   [x] Continue per plan of care [] Alter current plan (see comments above)  [] Plan of care initiated [] Hold pending MD visit [] Discharge    Electronically signed by:  Salbador Malik, PT,MPT,ATC , cert DN    Note: If patient does not return for scheduled/ recommended follow up visits, this note will serve as a discharge from care along with most recent update on progress.

## 2021-12-01 LAB
AGE TIME: 72 YRS
ALBUMIN SERPL-MCNC: 3.7 G/DL (ref 3.5–5)
ANION GAP SERPL CALCULATED.3IONS-SCNC: 8 MMOL/L (ref 7–16)
BUN BLDV-MCNC: 20 MG/DL (ref 7–20)
CALCIUM SERPL-MCNC: 9 MG/DL (ref 8.5–10.1)
CHLORIDE BLD-SCNC: 104 MMOL/L (ref 98–108)
CO2: 29.1 MMOL/L (ref 21–32)
CREAT SERPL-MCNC: 1.02 MG/DL (ref 0.7–1.3)
CREATININE URINE: 125.59 MG/DL (ref 20–370)
GFR AFRICAN AMERICAN: 87 ML/MIN
GFR NON-AFRICAN AMERICAN: 72 ML/MIN
GLUCOSE BLD-MCNC: 72 MG/DL (ref 70–110)
MICROALBUMIN UR-MCNC: 2.9 MG/L (ref 1.3–20)
MICROALBUMIN/CREAT 24H UR: NORMAL MG/G{CREAT}
MICROALBUMIN/CREAT UR-RTO: 2.3 MG/G (ref 0–29)
PHOSPHORUS: 3.8 MG/DL (ref 2.6–4.7)
POTASSIUM SERPL-SCNC: 4.3 MMOL/L (ref 3.6–5)
RENAL FUNCTION PANEL: NORMAL
SODIUM BLD-SCNC: 141 MMOL/L (ref 137–148)
URIC ACID, SERUM: 6.2 MG/DL (ref 3.5–7.2)
VITAMIN D 25-HYDROXY: 48.8 NG/ML (ref 30–100)

## 2021-12-02 LAB — Lab: NORMAL

## 2021-12-03 ENCOUNTER — HOSPITAL ENCOUNTER (OUTPATIENT)
Dept: PHYSICAL THERAPY | Age: 71
Setting detail: THERAPIES SERIES
Discharge: HOME | End: 2021-12-03
Payer: MEDICARE

## 2021-12-03 PROCEDURE — 97112 NEUROMUSCULAR REEDUCATION: CPT | Performed by: PHYSICAL THERAPY ASSISTANT

## 2021-12-03 PROCEDURE — 97140 MANUAL THERAPY 1/> REGIONS: CPT | Performed by: PHYSICAL THERAPY ASSISTANT

## 2021-12-03 PROCEDURE — 97110 THERAPEUTIC EXERCISES: CPT | Performed by: PHYSICAL THERAPY ASSISTANT

## 2021-12-03 NOTE — FLOWSHEET NOTE
Formerly [facility], [address] [place] [address] Kenia Davila, [facility]  Phone: [phone], [phone]      Physical Therapy Treatment Note/ Progress Report:     Date:  12/3/2021    Patient Name:  Mark Sarabia    :  1950  MRN: [id number]  Restrictions/Precautions:    Medical/Treatment Diagnosis Information:  · Diagnosis: Left Knee OA s/p Left TKA 10/6/2021  · Treatment Diagnosis: Z52.01  Insurance/Certification information:  PT Insurance Information: Medicare  Physician Information:  Referring Practitioner: Catherine Orlando  Has the plan of care been signed (Y/N):        []  Yes  [x]  No     Date of Patient follow up with Physician: Dec 2, 2021    Is this a Progress Report:     []  Yes  [x]  No      If Yes:  Date Range for reporting period:  Initial Eval: 10/11/2021  Beginning: 10/11/2021 --- Endin/10/2021  Beginnin21 --------- Ending 21    Progress report will be due (10 Rx or 30 days whichever is less):      Recertification will be due (POC Duration  / 90 days whichever is less): 2022      Visit # Insurance Allowable Auth Required   In Person [address] []  Yes     []  No    [facility] -  []  Yes     []  No    Total 15       Functional Scale: LEFS: 75% (Score: 20/80)   Date assessed: 10/11/2021   Functional Scale: LEFS: 53% (Score: 38/80)   Date assessed: 21     Latex Allergy:  [x]NO      []YES  Preferred Language for Healthcare:   [x]English       []other:    Pain level:  6/10 when up and moving       SUBJECTIVE:  Saw MD yesterday and he was pleased with progress and wants him to continue with PT and gave new order for two times a week for 6 weeks.        OBJECTIVE: See eval   Observation:    Test measurements:    2 from zero to 120 flexion 2021   2 from zero to 119 flexion 2021  PROM 2 °  from zero to 120 °  flexion 2021; AROM 2 °  from zero to 125 °   *  12/3/2021 2 from zero to 116 in supine passive and sitting with patient belt RESTRICTIONS/PRECAUTIONS: Hx HTN, Hyperlipidemia; Hx stent placment    Exercises/Interventions:   Therapeutic Ex (88360)  Therapeutic Activity (29850)  NMR re-education (09003) Sets/Reps Notes/CUES   Bike 5' L3         Slant Board  HR 3 x 30\"  x30    SLB 10 x 10\" Airex   Standing march x30 Single arm   Mini squat x20    Leg Press 100# x30 DL  80# x20 SL  80# 10 x 10\" Hover    Lateral Step Up 15 x ea 6\"   FSU 6\" 2 x 10    JUAN TKE  JUAN standing abd 60# x20  45# x20 R, L    HS curls  Knee extension 40# x30 R, L  10# x20         Quad Sets 15 x 5\"    Hamstring Sets     SAQ 3# 3 x 10    SLR 3 x 10    SL SLR  3 x 10   LAQ  3# 3 x 10    Bridge 3\" x20    HL clams x20 ea 2 way Green        Heel Slides with belt & slide board 3\" x20    Heel Prop 2 x 2'         Long Sit Gastroc Stretch 3  x  30\"    Long Sit Hamstring Stretch 3  x  30\"     Seated knee flex stretch 3  x 30\"                                                                Manual Intervention (83885)     Patella Mobs 5'    PROM Knee Flex/Ext 10'                         Medbridge access code:   Access Code: [email or web address]  URL: [email or web address]  Date: 10/11/2021  Prepared by: Kirit Guerrero    Exercises  Seated Table Hamstring Stretch - 1 x daily - 7 x weekly - 5 reps - 1 sets - 30\" hold  Long Sitting Calf Stretch with Strap - 1 x daily - 7 x weekly - 5 reps - 1 sets - 30\" hold  Supine Quad Set - 1-2 x daily - 7 x weekly - 1-2 sets - 10 reps - 5\" hold  Supine Short Arc Quad - 1 x daily - 7 x weekly - 10 reps - 3 sets  Active Straight Leg Raise with Quad Set - 1 x daily - 7 x weekly - 3 sets - 10 reps  Seated Knee Flexion AAROM - 1 x daily - 7 x weekly - 1 sets - 10 reps - 10\" hold               Patient Education 10' Pt education with HEP and progression of PT along with compliance with HEP to aide with formal PT for optimal outcomes.  Education with proper gait training to improve heel strike and TKE        Therapeutic Exercise and NMR EXR  [x] (76890) Provided verbal/tactile cueing for activities related to strengthening, flexibility, endurance, ROM for improvements in LE, proximal hip, and core control with self care, mobility, lifting, ambulation. [x] (41126) Provided verbal/tactile cueing for activities related to improving balance, coordination, kinesthetic sense, posture, motor skill, proprioception to assist with LE, proximal hip, and core control in self-care, mobility, lifting, ambulation and eccentric single leg control. NMR and Therapeutic Activities:    [x] (80608 or 80492) Provided verbal/tactile cueing for activities related to improving balance, coordination, kinesthetic sense, posture, motor skill, proprioception and motor activation to allow for proper function of core, proximal hip and LE with self-care and ADLs and functional mobility.    [x] (88376) Gait Re-education- Provided training and instruction to the patient for proper LE, core and proximal hip recruitment and positioning and eccentric body weight control with ambulation re-education including up and down stairs     Home Exercise Program:    [x] (38501) Reviewed/Progressed HEP activities related to strengthening, flexibility, endurance, ROM of core, proximal hip and LE for functional self-care, mobility, lifting and ambulation/stair navigation   [x] (28267) Reviewed/Progressed HEP activities related to improving balance, coordination, kinesthetic sense, posture, motor skill, proprioception of core, proximal hip and LE for self-care, mobility, lifting, and ambulation/stair navigation      Manual Treatments:  PROM / STM / Oscillations-Mobs:  G-I, II, III, IV (PA's, Inf., Post.)  [x] (14448) Provided manual therapy to mobilize LE, proximal hip and/or LS spine soft tissue/joints for the purpose of modulating pain, promoting relaxation, increasing ROM, reducing/eliminating soft tissue swelling/inflammation/restriction, improving soft tissue extensibility and allowing for proper ROM for

## 2021-12-06 ENCOUNTER — HOSPITAL ENCOUNTER (OUTPATIENT)
Dept: PHYSICAL THERAPY | Age: 71
Setting detail: THERAPIES SERIES
Discharge: HOME | End: 2021-12-06
Payer: MEDICARE

## 2021-12-06 PROCEDURE — 97140 MANUAL THERAPY 1/> REGIONS: CPT | Performed by: PHYSICAL THERAPY ASSISTANT

## 2021-12-06 PROCEDURE — 97110 THERAPEUTIC EXERCISES: CPT | Performed by: PHYSICAL THERAPY ASSISTANT

## 2021-12-06 PROCEDURE — 97112 NEUROMUSCULAR REEDUCATION: CPT | Performed by: PHYSICAL THERAPY ASSISTANT

## 2021-12-06 NOTE — FLOWSHEET NOTE
[facility], [address] [place] [address] [address], [address]  Phone: [phone], [phone]  Date: 2021          Patient Name; :  Mj Womack; 1950   Dx/ICD Code: Diagnosis: Left Knee OA s/p Left TKA 10/6/2021  Treatment Diagnosis: M17.12     Physician:  Lamar Loomis        Total PT Visits: 16     Measures Previous Current   Pain (0-10) 10/10 6/10   Disability % LEFS:  75% LEFS:  26%   ROM  0-116             Strength                 Specific Functional Improvements & Impressions:  Kristan is making slow steady progress with ROM and strength. His gait is improving. Plan & Recommendations:  [x] Continue rehabilitation due to objective improvement and continued functional deficits with frequency and duration: 2 times a week for 6-8 weeks  [] Progress toward  []GAP, []Work Conditioning, []Independent HEP   [] Discharge due to   [] All goals achieved, [] Maximized \"medical necessity\" [] No subjective or objective improvements      Electronically signed by:  Janine Burgess PTA, Donn Heys PT,MPT,ATC, cert DN  Therapy Plan of Care Re-Certification  This patient has been re-evaluated for physical therapy services and for therapy to continue, Medicare, Medicaid and other insurances require periodic physician review of the treatment plan. Please review the above re-evaluation and verify that you agree with plan of care as established above by signing the attached document and return it to our office or note changes to established plan below  [] Follow treatment plan as above [] Discontinue physical therapy  [] Change plan to:                                 __________________________________________________    Physician Signature:____________________________________ Date:____________  By signing above, therapists plan is approved by physician    If you have any questions or concerns, please don't hesitate to call.   Thank you for your referral.    [facility] office 120 °  flexion 11/29/2021; AROM 2 °  from zero to 125 °   *  12/3/2021 2 from zero to 116 in supine passive and sitting with patient belt   * 12/6/2021 0-116    RESTRICTIONS/PRECAUTIONS: Hx HTN, Hyperlipidemia; Hx stent placment    Exercises/Interventions:   Therapeutic Ex (45050)  Therapeutic Activity (46668)  NMR re-education (52819) Sets/Reps Notes/CUES   Bike 5' L3         Slant Board  HR 3 x 30\"  x30    SLB 10 x 10\" Airex   Standing march x30 Single arm   Mini squat x20 airex    Leg Press 100# x30 DL  80# x20 SL  80# 10 x 10\" Hover    Lateral Step Up 15 x ea 6\"   FSU 6\" 2 x 10    JUAN TKE  JUAN standing abd 60# x20  60# x20 R, L    HS curls  Knee extension 40# x30 R, L  10# x20         Quad Sets 15 x 5\"    Hamstring Sets     SAQ 3# 3 x 10    SLR 3 x 10    SL SLR  3 x 10   LAQ  3# 3 x 10    Bridge 3\" x20    HL clams x20 ea 2 way Green        Heel Slides with belt & slide board 3\" x20    Heel Prop 2 x 2'         Long Sit Gastroc Stretch 3  x  30\"    Long Sit Hamstring Stretch 3  x  30\"     Seated knee flex stretch 3  x 30\"                                                                Manual Intervention (28885)     Patella Mobs 5'    PROM Knee Flex/Ext 10'                         Medbridge access code:   Access Code: [email or web address]  URL: [email or web address]  Date: 10/11/2021  Prepared by: Vanessa Fong    Exercises  Seated Table Hamstring Stretch - 1 x daily - 7 x weekly - 5 reps - 1 sets - 30\" hold  Long Sitting Calf Stretch with Strap - 1 x daily - 7 x weekly - 5 reps - 1 sets - 30\" hold  Supine Quad Set - 1-2 x daily - 7 x weekly - 1-2 sets - 10 reps - 5\" hold  Supine Short Arc Quad - 1 x daily - 7 x weekly - 10 reps - 3 sets  Active Straight Leg Raise with Quad Set - 1 x daily - 7 x weekly - 3 sets - 10 reps  Seated Knee Flexion AAROM - 1 x daily - 7 x weekly - 1 sets - 10 reps - 10\" hold               Patient Education 10' Pt education with HEP and progression of PT along with compliance with HEP to aide with formal PT for optimal outcomes. Education with proper gait training to improve heel strike and TKE        Therapeutic Exercise and NMR EXR  [x] (76159) Provided verbal/tactile cueing for activities related to strengthening, flexibility, endurance, ROM for improvements in LE, proximal hip, and core control with self care, mobility, lifting, ambulation. [x] (85126) Provided verbal/tactile cueing for activities related to improving balance, coordination, kinesthetic sense, posture, motor skill, proprioception to assist with LE, proximal hip, and core control in self-care, mobility, lifting, ambulation and eccentric single leg control. NMR and Therapeutic Activities:    [x] (75606 or 16960) Provided verbal/tactile cueing for activities related to improving balance, coordination, kinesthetic sense, posture, motor skill, proprioception and motor activation to allow for proper function of core, proximal hip and LE with self-care and ADLs and functional mobility.    [x] (24233) Gait Re-education- Provided training and instruction to the patient for proper LE, core and proximal hip recruitment and positioning and eccentric body weight control with ambulation re-education including up and down stairs     Home Exercise Program:    [x] (64798) Reviewed/Progressed HEP activities related to strengthening, flexibility, endurance, ROM of core, proximal hip and LE for functional self-care, mobility, lifting and ambulation/stair navigation   [x] (14455) Reviewed/Progressed HEP activities related to improving balance, coordination, kinesthetic sense, posture, motor skill, proprioception of core, proximal hip and LE for self-care, mobility, lifting, and ambulation/stair navigation      Manual Treatments:  PROM / STM / Oscillations-Mobs:  G-I, II, III, IV (PA's, Inf., Post.)  [x] (39427) Provided manual therapy to mobilize LE, proximal hip and/or LS spine soft tissue/joints for the purpose of modulating pain, promoting relaxation, increasing ROM, reducing/eliminating soft tissue swelling/inflammation/restriction, improving soft tissue extensibility and allowing for proper ROM for normal function with self-care, mobility, lifting and ambulation. Modalities:    Charges:  Timed Code Treatment Minutes: 61'   Total Treatment Minutes:  60'   BWC:  TE TIME:  NMR TIME:  MANUAL TIME:  UNTIMED MINUTES:  Medicare Total:    -  -  -  -  Visits (15)  Total: $1520      [] EVAL (LOW) 28953 (typically 20 minutes face-to-face)  [] EVAL (MOD) 51294 (typically 30 minutes face-to-face)  [] EVAL (HIGH) 27068 (typically 45 minutes face-to-face)  [] RE-EVAL     [x] SI(54964) x 2    [] IONTO  [x] NMR (74696) x   1  [] VASO  [x] Manual (57675) x 1    [] Other:  [] TA x      [] Mech Traction (39600)  [] ES(attended) (17277)      [] ES (un) (14420):    ASSESSMENT:  Patient had some stiffness today and it took a while to get stretched out. Focused on extension and quad strength to improve TKE. GOALS:   Short Term Goals: To be achieved in: 2 weeks  1. Independent in HEP and progression per patient tolerance, in order to prevent re-injury. [] Progressing: [x] Met: [] Not Met: [] Adjusted   2. Patient will have a decrease in pain to facilitate improvement in movement, function, and ADLs as indicated by Functional Deficits. [] Progressing: [x] Met: [] Not Met: [] Adjusted     Long Term Goals: To be achieved in: 12 weeks  1. Disability index score of 20% or less for the LEFS to assist with reaching prior level of function. [x] Progressing: [] Met: [] Not Met: [] Adjusted   2. Patient will demonstrate increased AROM to equal the opposite side bilaterally to allow for proper joint functioning as indicated by patients Functional Deficits. [x] Progressing: [] Met: [] Not Met: [] Adjusted   3.  Patient will demonstrate an increase in strength of Left Hip and Knee = Right Hip and Knee to allow for proper functional mobility as indicated by patients Functional Deficits. [x] Progressing: [] Met: [] Not Met: [] Adjusted   4. Patient will return to all transfers, work activities, and functional activities without increased symptoms or restriction. [x] Progressing: [] Met: [] Not Met: [] Adjusted   5. Patient will have </=1-2/10 pain with ADL's. [x] Progressing: [] Met: [] Not Met: [] Adjusted   6. Patient stated goal: To return to normal walking and functional activity   [x] Progressing: [] Met: [] Not Met: [] Adjusted    Overall Progression Towards Functional goals/ Treatment Progress Update:  [x] Patient is progressing as expected towards functional goals listed. [] Progression is slowed due to complexities/Impairments listed. [] Progression has been slowed due to co-morbidities.   [] Plan just implemented, too soon to assess goals progression <30days   [] Goals require adjustment due to lack of progress  [] Patient is not progressing as expected and requires additional follow up with physician  [] Other    Prognosis for POC: [x] Good [] Fair  [] Poor    Patient requires continued skilled intervention: [x] Yes  [] No    Treatment/Activity Tolerance:  [x] Patient able to complete treatment  [] Patient limited by fatigue  [] Patient limited by pain    [] Patient limited by other medical complications  [] Other:     Return to Play: (if applicable)   []  Stage 1: Intro to Strength   []  Stage 2: Return to Run and Strength   []  Stage 3: Return to Jump and Strength   []  Stage 4: Dynamic Strength and Agility   []  Stage 5: Sport Specific Training     []  Ready to Return to Play, Meets All Above Stages   []  Not Ready for Return to Sports   Comments:                         PLAN:   [x] Continue per plan of care [] Alter current plan (see comments above)  [] Plan of care initiated [] Hold pending MD visit [] Discharge    Electronically signed by:  Masood Mantilla, PTA, Kasie Carney PT, MPT,ATC, cert DN    Note: If patient does not return for scheduled/ recommended follow up visits, this note will serve as a discharge from care along with most recent update on progress.

## 2021-12-09 ENCOUNTER — HOSPITAL ENCOUNTER (OUTPATIENT)
Dept: PHYSICAL THERAPY | Age: 71
Setting detail: THERAPIES SERIES
Discharge: HOME | End: 2021-12-09
Payer: MEDICARE

## 2021-12-09 PROCEDURE — 97112 NEUROMUSCULAR REEDUCATION: CPT

## 2021-12-09 PROCEDURE — 97140 MANUAL THERAPY 1/> REGIONS: CPT

## 2021-12-09 NOTE — FLOWSHEET NOTE
[address], [address] [facility] [address] [place] ([facility] [place] [address] [place] [address]  Phone: [phone]  Fax [phone]      ATHLETIC TRAINING AREA ACTIVITY SHEET  Date:  2021    Patient Name:  Rhea Bro    :  1950  MRN: [id number]  Restrictions/Precautions:    Medical/Treatment Diagnosis Information:  ·   Diagnosis: Left Knee OA s/p Left TKA 10/6/2021  · Treatment Diagnosis: M17.12  ·    Physician Information:    Referring Practitioner: Elyse Rothman    Weeks Post-op  8 wks  12 wks 16 wks 20 wks   24 wks                            Activity Log                                                  DOS/DOI:                                                    Date: 2021    ATC communication \"Still has some pain but its coming along\"   Bike    Elliptical    Treadmill    Airdyne        Slant Board stretch 3 x 30\"   Soleus stretch    Hamstring stretch    ITB stretch        HR X 30       Standing march X 30               Lateral walking (with/w/o TB)        Balance: PEP/Lara board                   SLS          Star excursion load/explode          Extremity reach UE/LE        Leg Press Socrates. 100# x 30                     Hover 80# 10 x 10\"                     Inv. 80# x 20       Calf Press Socrates. Ecc.                        Inv.        JUAN   Flex               ABd 60# x 20              ADd              TKE 60# x 20              Ext        Steps Up 6\" x 20              Up and Over               Down               Lateral X 15 each              Rotation        Squats  mini X 20                 wall                 BOSU         Lunges:  Lunge to Balance                   Balance to Lunge                   Walking        Knee Extension Bilat. 10# x 20                              Ecc.                               Inv. Hamstring Curls Bilat.  40# x 30                              Ecc.                               Inv.

## 2021-12-09 NOTE — FLOWSHEET NOTE
strike and TKE        Therapeutic Exercise and NMR EXR  [x] (54930) Provided verbal/tactile cueing for activities related to strengthening, flexibility, endurance, ROM for improvements in LE, proximal hip, and core control with self care, mobility, lifting, ambulation. [x] (56250) Provided verbal/tactile cueing for activities related to improving balance, coordination, kinesthetic sense, posture, motor skill, proprioception to assist with LE, proximal hip, and core control in self-care, mobility, lifting, ambulation and eccentric single leg control. NMR and Therapeutic Activities:    [x] (11023 or 63785) Provided verbal/tactile cueing for activities related to improving balance, coordination, kinesthetic sense, posture, motor skill, proprioception and motor activation to allow for proper function of core, proximal hip and LE with self-care and ADLs and functional mobility.    [x] (81428) Gait Re-education- Provided training and instruction to the patient for proper LE, core and proximal hip recruitment and positioning and eccentric body weight control with ambulation re-education including up and down stairs     Home Exercise Program:    [x] (24330) Reviewed/Progressed HEP activities related to strengthening, flexibility, endurance, ROM of core, proximal hip and LE for functional self-care, mobility, lifting and ambulation/stair navigation   [x] (85305) Reviewed/Progressed HEP activities related to improving balance, coordination, kinesthetic sense, posture, motor skill, proprioception of core, proximal hip and LE for self-care, mobility, lifting, and ambulation/stair navigation      Manual Treatments:  PROM / STM / Oscillations-Mobs:  G-I, II, III, IV (PA's, Inf., Post.)  [x] (56348) Provided manual therapy to mobilize LE, proximal hip and/or LS spine soft tissue/joints for the purpose of modulating pain, promoting relaxation, increasing ROM, reducing/eliminating soft tissue swelling/inflammation/restriction, improving soft tissue extensibility and allowing for proper ROM for normal function with self-care, mobility, lifting and ambulation. Modalities:    Charges:  Timed Code Treatment Minutes: 61'   Total Treatment Minutes:  60'   BWC:  TE TIME:  NMR TIME:  MANUAL TIME:  UNTIMED MINUTES:  Medicare Total:    -  -  -  -  Visits (17)  Total: $1620      [] EVAL (LOW) 71856 (typically 20 minutes face-to-face)  [] EVAL (MOD) 06037 (typically 30 minutes face-to-face)  [] EVAL (HIGH) 82140 (typically 45 minutes face-to-face)  [] RE-EVAL     [] HZ(69815) x    [] IONTO  [x] NMR (84915) x   1  [] VASO  [x] Manual (49553) x 1    [] Other:  [] TA x      [] Mech Traction (53258)  [] ES(attended) (50970)      [] ES (un) (84226):    ASSESSMENT:  Patient had some stiffness today and it took a while to get stretched out. Focused on extension and quad strength to improve TKE. GOALS:   Short Term Goals: To be achieved in: 2 weeks  1. Independent in HEP and progression per patient tolerance, in order to prevent re-injury. [] Progressing: [x] Met: [] Not Met: [] Adjusted   2. Patient will have a decrease in pain to facilitate improvement in movement, function, and ADLs as indicated by Functional Deficits. [] Progressing: [x] Met: [] Not Met: [] Adjusted     Long Term Goals: To be achieved in: 12 weeks  1. Disability index score of 20% or less for the LEFS to assist with reaching prior level of function. [x] Progressing: [] Met: [] Not Met: [] Adjusted   2. Patient will demonstrate increased AROM to equal the opposite side bilaterally to allow for proper joint functioning as indicated by patients Functional Deficits. [x] Progressing: [] Met: [] Not Met: [] Adjusted   3. Patient will demonstrate an increase in strength of Left Hip and Knee = Right Hip and Knee to allow for proper functional mobility as indicated by patients Functional Deficits. [x] Progressing: [] Met: [] Not Met: [] Adjusted   4.  Patient will return to all transfers, work activities, and functional activities without increased symptoms or restriction. [x] Progressing: [] Met: [] Not Met: [] Adjusted   5. Patient will have </=1-2/10 pain with ADL's. [x] Progressing: [] Met: [] Not Met: [] Adjusted   6. Patient stated goal: To return to normal walking and functional activity   [x] Progressing: [] Met: [] Not Met: [] Adjusted    Overall Progression Towards Functional goals/ Treatment Progress Update:  [x] Patient is progressing as expected towards functional goals listed. [] Progression is slowed due to complexities/Impairments listed. [] Progression has been slowed due to co-morbidities. [] Plan just implemented, too soon to assess goals progression <30days   [] Goals require adjustment due to lack of progress  [] Patient is not progressing as expected and requires additional follow up with physician  [] Other    Prognosis for POC: [x] Good [] Fair  [] Poor    Patient requires continued skilled intervention: [x] Yes  [] No    Treatment/Activity Tolerance:  [x] Patient able to complete treatment  [] Patient limited by fatigue  [] Patient limited by pain    [] Patient limited by other medical complications  [] Other:     Return to Play: (if applicable)   []  Stage 1: Intro to Strength   []  Stage 2: Return to Run and Strength   []  Stage 3: Return to Jump and Strength   []  Stage 4: Dynamic Strength and Agility   []  Stage 5: Sport Specific Training     []  Ready to Return to Play, Meets All Above Stages   []  Not Ready for Return to Sports   Comments:                         PLAN:   [x] Continue per plan of care [] Alter current plan (see comments above)  [] Plan of care initiated [] Hold pending MD visit [] Discharge    Electronically signed by:  Kelly Ibarra, PT, MPT,ATC, cert DN    Note: If patient does not return for scheduled/ recommended follow up visits, this note will serve as a discharge from care along with most recent update on progress.

## 2021-12-14 ENCOUNTER — HOSPITAL ENCOUNTER (OUTPATIENT)
Dept: PHYSICAL THERAPY | Age: 71
Setting detail: THERAPIES SERIES
Discharge: HOME | End: 2021-12-14
Payer: MEDICARE

## 2021-12-14 PROCEDURE — 97112 NEUROMUSCULAR REEDUCATION: CPT

## 2021-12-14 PROCEDURE — 97140 MANUAL THERAPY 1/> REGIONS: CPT

## 2021-12-14 NOTE — FLOWSHEET NOTE
[facility], [address] [place] [address] Hal[address], [address]  Phone: [phone], [phone]      Physical Therapy Treatment Note/ Progress Report:     Date:  2021    Patient Name:  Antoni Brown    :  1950  MRN: [id number]  Restrictions/Precautions:    Medical/Treatment Diagnosis Information:  · Diagnosis: Left Knee OA s/p Left TKA 10/6/2021  · Treatment Diagnosis: C78.88  Insurance/Certification information:  PT Insurance Information: Medicare  Physician Information:  Referring Practitioner: Pantera Terry  Has the plan of care been signed (Y/N):        []  Yes  [x]  No     Date of Patient follow up with Physician: Dec 2, 2021    Is this a Progress Report:     []  Yes  [x]  No      If Yes:  Date Range for reporting period:    Beginnin2021 ====Endin2021    Progress report will be due (10 Rx or 30 days whichever is less): 363     Recertification will be due (POC Duration  / 90 days whichever is less): 2022      Visit # Insurance Allowable Auth Required   In Person [place] 40 []  Yes     []  No    Tele Health -  []  Yes     []  No    Total 18         Functional Scale:  LEFS:  26% (Score:  59/80)   Date assessed:  2021       Latex Allergy:  [x]NO      []YES  Preferred Language for Healthcare:   [x]English       []other:    Pain level:  6/10 when up and moving      SUBJECTIVE:   Patient reports that he is feeling better today than he did last visit. Patient states that he was on his feet more today which he attributes to his increased stiffness. OBJECTIVE: See eval   Observation:    Test measurements:    2 from zero to 120 flexion 2021   2 from zero to 119 flexion 2021  PROM 2 °  from zero to 120 °  flexion 2021; AROM 2 °  from zero to 125 °   *  12/3/2021 2 from zero to 116 in supine passive and sitting with patient belt   * 2021 0-116    RESTRICTIONS/PRECAUTIONS: Hx HTN, Hyperlipidemia;  Hx stent placment    Exercises/Interventions:   Therapeutic Ex (51140)  Therapeutic Activity (66706)  NMR re-education (07134) Sets/Reps Notes/CUES   Bike 5' L3         Slant Board  HR 3 x 30\"  x30    SLB 10 x 10\" Airex   Standing march x30 Single arm   Mini squat x20 airex    Leg Press 100# x30 DL  80# x20 SL  80# 10 x 10\" Hover    Lateral Step Up 15 x ea 6\"   FSU  2 x 10 6\"    [address] TKE  JUAN standing abd 60# x20  60# x20 R, L    HS curls  Knee extension 40# x30 R, L  10# x20         Quad Sets 15 x 5\"         SAQ 3 x 10 3#   SLR 3 x 10 1#   SL SLR  3 x 101#   LAQ  3 x 10 3#   Bridge 3\" x20    HL clams x20 ea 2 way Green        Heel Slides with belt & slide board 3\" x20    Heel Prop 2 x 2'         Long Sit Gastroc Stretch 3  x  30\"    Long Sit Hamstring Stretch 3  x  30\"     Seated knee flex stretch 3  x 30\"                                                                Manual Intervention (12038)     A/P tibiofemoral joint mobs 5'    Long axis distraction  3'       PROM Knee Flex/Ext 7'                    Medbridge access code:   Access Code: [email or web address]  URL: [email or web address]  Date: 10/11/2021  Prepared by: Abad Cr    Exercises  Seated Table Hamstring Stretch - 1 x daily - 7 x weekly - 5 reps - 1 sets - 30\" hold  Long Sitting Calf Stretch with Strap - 1 x daily - 7 x weekly - 5 reps - 1 sets - 30\" hold  Supine Quad Set - 1-2 x daily - 7 x weekly - 1-2 sets - 10 reps - 5\" hold  Supine Short Arc Quad - 1 x daily - 7 x weekly - 10 reps - 3 sets  Active Straight Leg Raise with Quad Set - 1 x daily - 7 x weekly - 3 sets - 10 reps  Seated Knee Flexion AAROM - 1 x daily - 7 x weekly - 1 sets - 10 reps - 10\" hold               Patient Education 5' Pt education with HEP and progression of PT along with compliance with HEP to aide with formal PT for optimal outcomes.  Education with proper gait training to improve heel strike and TKE        Therapeutic Exercise and NMR EXR  [x] (72247) Provided verbal/tactile cueing for activities related to strengthening, flexibility, endurance, ROM for improvements in LE, proximal hip, and core control with self care, mobility, lifting, ambulation. [x] (91417) Provided verbal/tactile cueing for activities related to improving balance, coordination, kinesthetic sense, posture, motor skill, proprioception to assist with LE, proximal hip, and core control in self-care, mobility, lifting, ambulation and eccentric single leg control. NMR and Therapeutic Activities:    [x] (19790 or 33836) Provided verbal/tactile cueing for activities related to improving balance, coordination, kinesthetic sense, posture, motor skill, proprioception and motor activation to allow for proper function of core, proximal hip and LE with self-care and ADLs and functional mobility.    [x] (59762) Gait Re-education- Provided training and instruction to the patient for proper LE, core and proximal hip recruitment and positioning and eccentric body weight control with ambulation re-education including up and down stairs     Home Exercise Program:    [x] (10152) Reviewed/Progressed HEP activities related to strengthening, flexibility, endurance, ROM of core, proximal hip and LE for functional self-care, mobility, lifting and ambulation/stair navigation   [x] (39863) Reviewed/Progressed HEP activities related to improving balance, coordination, kinesthetic sense, posture, motor skill, proprioception of core, proximal hip and LE for self-care, mobility, lifting, and ambulation/stair navigation      Manual Treatments:  PROM / STM / Oscillations-Mobs:  G-I, II, III, IV (PA's, Inf., Post.)  [x] (34933) Provided manual therapy to mobilize LE, proximal hip and/or LS spine soft tissue/joints for the purpose of modulating pain, promoting relaxation, increasing ROM, reducing/eliminating soft tissue swelling/inflammation/restriction, improving soft tissue extensibility and allowing for proper ROM for normal function with self-care, mobility, lifting and ambulation. Modalities:    Charges:  Timed Code Treatment Minutes: 30'   Total Treatment Minutes:  60'   BWC:  TE TIME:  NMR TIME:  MANUAL TIME:  UNTIMED MINUTES:  Medicare Total:    -  -  -  -  Visits (18)  Total: $1720      [] EVAL (LOW) 56293 (typically 20 minutes face-to-face)  [] EVAL (MOD) 02185 (typically 30 minutes face-to-face)  [] EVAL (HIGH) 22712 (typically 45 minutes face-to-face)  [] RE-EVAL     [] OF(67520) x    [] IONTO  [x] NMR (98989) x   1  [] VASO  [x] Manual (43443) x 1    [] Other:  [] TA x      [] Mech Traction (43349)  [] ES(attended) (86644)      [] ES (un) (33270):    ASSESSMENT:  Patient continues to have some notable stiffness on this date. Today's session was focused on improving LE strength and knee joint mobility. GOALS:   Short Term Goals: To be achieved in: 2 weeks  1. Independent in HEP and progression per patient tolerance, in order to prevent re-injury. [] Progressing: [x] Met: [] Not Met: [] Adjusted   2. Patient will have a decrease in pain to facilitate improvement in movement, function, and ADLs as indicated by Functional Deficits. [] Progressing: [x] Met: [] Not Met: [] Adjusted     Long Term Goals: To be achieved in: 12 weeks  1. Disability index score of 20% or less for the LEFS to assist with reaching prior level of function. [x] Progressing: [] Met: [] Not Met: [] Adjusted   2. Patient will demonstrate increased AROM to equal the opposite side bilaterally to allow for proper joint functioning as indicated by patients Functional Deficits. [x] Progressing: [] Met: [] Not Met: [] Adjusted   3. Patient will demonstrate an increase in strength of Left Hip and Knee = Right Hip and Knee to allow for proper functional mobility as indicated by patients Functional Deficits. [x] Progressing: [] Met: [] Not Met: [] Adjusted   4.  Patient will return to all transfers, work activities, and functional activities without increased symptoms or restriction. [x] Progressing: [] Met: [] Not Met: [] Adjusted   5. Patient will have </=1-2/10 pain with ADL's. [x] Progressing: [] Met: [] Not Met: [] Adjusted   6. Patient stated goal: To return to normal walking and functional activity   [x] Progressing: [] Met: [] Not Met: [] Adjusted    Overall Progression Towards Functional goals/ Treatment Progress Update:  [x] Patient is progressing as expected towards functional goals listed. [] Progression is slowed due to complexities/Impairments listed. [] Progression has been slowed due to co-morbidities. [] Plan just implemented, too soon to assess goals progression <30days   [] Goals require adjustment due to lack of progress  [] Patient is not progressing as expected and requires additional follow up with physician  [] Other    Prognosis for POC: [x] Good [] Fair  [] Poor    Patient requires continued skilled intervention: [x] Yes  [] No    Treatment/Activity Tolerance:  [x] Patient able to complete treatment  [] Patient limited by fatigue  [] Patient limited by pain    [] Patient limited by other medical complications  [] Other:     Return to Play: (if applicable)   []  Stage 1: Intro to Strength   []  Stage 2: Return to Run and Strength   []  Stage 3: Return to Jump and Strength   []  Stage 4: Dynamic Strength and Agility   []  Stage 5: Sport Specific Training     []  Ready to Return to Play, Meets All Above Stages   []  Not Ready for Return to Sports   Comments:                         PLAN:   [x] Continue per plan of care [] Alter current plan (see comments above)  [] Plan of care initiated [] Hold pending MD visit [] Discharge    Electronically signed by:  Wai Arango, PT, MPT,ATC, cert DN    Note: If patient does not return for scheduled/ recommended follow up visits, this note will serve as a discharge from care along with most recent update on progress.

## 2021-12-17 ENCOUNTER — HOSPITAL ENCOUNTER (OUTPATIENT)
Dept: PHYSICAL THERAPY | Age: 71
Setting detail: THERAPIES SERIES
Discharge: HOME | End: 2021-12-17
Payer: MEDICARE

## 2021-12-17 PROCEDURE — 97140 MANUAL THERAPY 1/> REGIONS: CPT | Performed by: PHYSICAL THERAPY ASSISTANT

## 2021-12-17 PROCEDURE — 97112 NEUROMUSCULAR REEDUCATION: CPT | Performed by: PHYSICAL THERAPY ASSISTANT

## 2021-12-17 PROCEDURE — 97110 THERAPEUTIC EXERCISES: CPT | Performed by: PHYSICAL THERAPY ASSISTANT

## 2021-12-17 NOTE — FLOWSHEET NOTE
[facility], [address] [place] [address], [address]  Phone: [phone], [phone]      Physical Therapy Treatment Note/ Progress Report:     Date:  2021    Patient Name:  Rhea Bro    :  1950  MRN: [id number]  Restrictions/Precautions:    Medical/Treatment Diagnosis Information:  · Diagnosis: Left Knee OA s/p Left TKA 10/6/2021  · Treatment Diagnosis: P61.34  Insurance/Certification information:  PT Insurance Information: Medicare  Physician Information:  Referring Practitioner: Simi Hernandez  Has the plan of care been signed (Y/N):        []  Yes  [x]  No     Date of Patient follow up with Physician: Dec 2, 2021    Is this a Progress Report:     []  Yes  [x]  No      If Yes:  Date Range for reporting period:    Beginnin2021 ====Endin2021    Progress report will be due (10 Rx or 30 days whichever is less): 3/7/3388     Recertification will be due (POC Duration  / 90 days whichever is less): 2022      Visit # Insurance Allowable Auth Required   In Person [place] 8 []  Yes     []  No    Tele Health -  []  Yes     []  No    Total 19         Functional Scale:  LEFS:  26% (Score:  59/80)   Date assessed:  2021       Latex Allergy:  [x]NO      []YES  Preferred Language for Healthcare:   [x]English       []other:    Pain level:  6/10 when up and moving      SUBJECTIVE:   Sore and achy today which he feels is related to the rain coming in. OBJECTIVE: See eval   Observation:    Test measurements:    2 from zero to 120 flexion 2021   2 from zero to 119 flexion 2021  PROM 2 °  from zero to 120 °  flexion 2021; AROM 2 °  from zero to 125 °   *  12/3/2021 2 from zero to 116 in supine passive and sitting with patient belt   * 2021 0-116  2021 - 0-120    RESTRICTIONS/PRECAUTIONS: Hx HTN, Hyperlipidemia;  Hx stent placment    Exercises/Interventions:   Therapeutic Ex (26305)  Therapeutic Activity (07769)  NMR re-education (18784) Sets/Reps Notes/CUES   Bike 5' L3         Slant Board  HR 3 x 30\"  x30    SLB 10 x 10\" Airex   Standing march X30 airex  Single arm   Mini squat x20 airex    Leg Press 100# x30 DL  80# x20 SL  80# 10 x 10\" Hover    Lateral Step Up 15 x ea 6\"   FSU  2 x 10 6\"    [address] [place] [address] TKE  JUAN standing abd 60# x20  60# x30 R, L    HS curls  Knee extension 40# x30 R, L  10# x20         Quad Sets 15 x 5\"         SAQ 3 x 10 3#   SLR 3 x 10 1#   SL SLR  3 x 101#   LAQ   3#   Bridge 3\" x20    HL clams x20 ea 2 way Green        Heel Slides with belt & slide board 3\" x20    Heel Prop 2 x 2'         Long Sit Gastroc Stretch 3  x  30\"    Long Sit Hamstring Stretch 3  x  30\"     Seated knee flex stretch 3  x 30\"                                                                Manual Intervention (54438)     A/P tibiofemoral joint mobs 5'    Long axis distraction  3'       PROM Knee Flex/Ext 7'                    Medbridge access code:   Access Code: [email or web address]  URL: [email or web address]  Date: 10/11/2021  Prepared by: Ritika Jordan    Exercises  Seated Table Hamstring Stretch - 1 x daily - 7 x weekly - 5 reps - 1 sets - 30\" hold  Long Sitting Calf Stretch with Strap - 1 x daily - 7 x weekly - 5 reps - 1 sets - 30\" hold  Supine Quad Set - 1-2 x daily - 7 x weekly - 1-2 sets - 10 reps - 5\" hold  Supine Short Arc Quad - 1 x daily - 7 x weekly - 10 reps - 3 sets  Active Straight Leg Raise with Quad Set - 1 x daily - 7 x weekly - 3 sets - 10 reps  Seated Knee Flexion AAROM - 1 x daily - 7 x weekly - 1 sets - 10 reps - 10\" hold               Patient Education 5' Pt education with HEP and progression of PT along with compliance with HEP to aide with formal PT for optimal outcomes.  Education with proper gait training to improve heel strike and TKE        Therapeutic Exercise and NMR EXR  [x] (15679) Provided verbal/tactile cueing for activities related to strengthening, flexibility, endurance, ROM for improvements in LE, proximal hip, and core control with self care, mobility, lifting, ambulation. [x] (77310) Provided verbal/tactile cueing for activities related to improving balance, coordination, kinesthetic sense, posture, motor skill, proprioception to assist with LE, proximal hip, and core control in self-care, mobility, lifting, ambulation and eccentric single leg control. NMR and Therapeutic Activities:    [x] (79366 or 50238) Provided verbal/tactile cueing for activities related to improving balance, coordination, kinesthetic sense, posture, motor skill, proprioception and motor activation to allow for proper function of core, proximal hip and LE with self-care and ADLs and functional mobility. [x] (36971) Gait Re-education- Provided training and instruction to the patient for proper LE, core and proximal hip recruitment and positioning and eccentric body weight control with ambulation re-education including up and down stairs     Home Exercise Program:    [x] (96215) Reviewed/Progressed HEP activities related to strengthening, flexibility, endurance, ROM of core, proximal hip and LE for functional self-care, mobility, lifting and ambulation/stair navigation   [x] (90341) Reviewed/Progressed HEP activities related to improving balance, coordination, kinesthetic sense, posture, motor skill, proprioception of core, proximal hip and LE for self-care, mobility, lifting, and ambulation/stair navigation      Manual Treatments:  PROM / STM / Oscillations-Mobs:  G-I, II, III, IV (PA's, Inf., Post.)  [x] (26191) Provided manual therapy to mobilize LE, proximal hip and/or LS spine soft tissue/joints for the purpose of modulating pain, promoting relaxation, increasing ROM, reducing/eliminating soft tissue swelling/inflammation/restriction, improving soft tissue extensibility and allowing for proper ROM for normal function with self-care, mobility, lifting and ambulation.      Modalities: Vaso x 10' for edema and pain control with Charges:  Timed Code Treatment Minutes: 61'   Total Treatment Minutes:  79'   BWC:  TE TIME:  NMR TIME:  MANUAL TIME:  UNTIMED MINUTES:  Medicare Total:    -  -  -  -  Visits (19)  Total: $1850      [] EVAL (LOW) 82283 (typically 20 minutes face-to-face)  [] EVAL (MOD) 93349 (typically 30 minutes face-to-face)  [] EVAL (HIGH) 67006 (typically 45 minutes face-to-face)  [] RE-EVAL     [x] LL(90850) x  2  [] IONTO  [x] NMR (39422) x   1  [] VASO  [x] Manual (08516) x 1    [] Other:  [] TA x      [] Mech Traction (76311)  [] ES(attended) (88608)      [] ES (un) (08356):    ASSESSMENT:  Patient continues to have some notable stiffness on this date. Today's session was focused on improving LE strength and knee joint mobility. GOALS:   Short Term Goals: To be achieved in: 2 weeks  1. Independent in HEP and progression per patient tolerance, in order to prevent re-injury. [] Progressing: [x] Met: [] Not Met: [] Adjusted   2. Patient will have a decrease in pain to facilitate improvement in movement, function, and ADLs as indicated by Functional Deficits. [] Progressing: [x] Met: [] Not Met: [] Adjusted     Long Term Goals: To be achieved in: 12 weeks  1. Disability index score of 20% or less for the LEFS to assist with reaching prior level of function. [x] Progressing: [] Met: [] Not Met: [] Adjusted   2. Patient will demonstrate increased AROM to equal the opposite side bilaterally to allow for proper joint functioning as indicated by patients Functional Deficits. [x] Progressing: [] Met: [] Not Met: [] Adjusted   3. Patient will demonstrate an increase in strength of Left Hip and Knee = Right Hip and Knee to allow for proper functional mobility as indicated by patients Functional Deficits. [x] Progressing: [] Met: [] Not Met: [] Adjusted   4. Patient will return to all transfers, work activities, and functional activities without increased symptoms or restriction.    [x] Progressing: [] Met: [] Not Met: [] Adjusted   5. Patient will have </=1-2/10 pain with ADL's. [x] Progressing: [] Met: [] Not Met: [] Adjusted   6. Patient stated goal: To return to normal walking and functional activity   [x] Progressing: [] Met: [] Not Met: [] Adjusted    Overall Progression Towards Functional goals/ Treatment Progress Update:  [x] Patient is progressing as expected towards functional goals listed. [] Progression is slowed due to complexities/Impairments listed. [] Progression has been slowed due to co-morbidities. [] Plan just implemented, too soon to assess goals progression <30days   [] Goals require adjustment due to lack of progress  [] Patient is not progressing as expected and requires additional follow up with physician  [] Other    Prognosis for POC: [x] Good [] Fair  [] Poor    Patient requires continued skilled intervention: [x] Yes  [] No    Treatment/Activity Tolerance:  [x] Patient able to complete treatment  [] Patient limited by fatigue  [] Patient limited by pain    [] Patient limited by other medical complications  [] Other:     Return to Play: (if applicable)   []  Stage 1: Intro to Strength   []  Stage 2: Return to Run and Strength   []  Stage 3: Return to Jump and Strength   []  Stage 4: Dynamic Strength and Agility   []  Stage 5: Sport Specific Training     []  Ready to Return to Play, Meets All Above Stages   []  Not Ready for Return to Sports   Comments:                         PLAN:   [x] Continue per plan of care [] Alter current plan (see comments above)  [] Plan of care initiated [] Hold pending MD visit [] Discharge    Electronically signed by:  Stephan Hernandez PTA    Note: If patient does not return for scheduled/ recommended follow up visits, this note will serve as a discharge from care along with most recent update on progress.

## 2021-12-20 ENCOUNTER — HOSPITAL ENCOUNTER (OUTPATIENT)
Dept: PHYSICAL THERAPY | Age: 71
Setting detail: THERAPIES SERIES
Discharge: HOME | End: 2021-12-20
Payer: MEDICARE

## 2021-12-20 PROCEDURE — 97112 NEUROMUSCULAR REEDUCATION: CPT | Performed by: PHYSICAL THERAPY ASSISTANT

## 2021-12-20 PROCEDURE — 97140 MANUAL THERAPY 1/> REGIONS: CPT | Performed by: PHYSICAL THERAPY ASSISTANT

## 2021-12-20 PROCEDURE — 97110 THERAPEUTIC EXERCISES: CPT | Performed by: PHYSICAL THERAPY ASSISTANT

## 2021-12-20 NOTE — FLOWSHEET NOTE
[facility], [address] [facility] [place], [address]  Phone: [phone], [phone]      Physical Therapy Treatment Note/ Progress Report:     Date:  2021    Patient Name:  Alcides Casillas    :  1950  MRN: [id number]  Restrictions/Precautions:    Medical/Treatment Diagnosis Information:  · Diagnosis: Left Knee OA s/p Left TKA 10/6/2021  · Treatment Diagnosis: H89.20  Insurance/Certification information:  PT Insurance Information: Medicare  Physician Information:  Referring Practitioner: Da Tom  Has the plan of care been signed (Y/N):        []  Yes  [x]  No     Date of Patient follow up with Physician: Dec 2, 2021    Is this a Progress Report:     []  Yes  [x]  No      If Yes:  Date Range for reporting period:    Beginnin2021 ====Endin2021    Progress report will be due (10 Rx or 30 days whichever is less): 5309     Recertification will be due (POC Duration  / 90 days whichever is less): 2022      Visit # Insurance Allowable Auth Required   In Person [facility] []  Yes     []  No    Tele Health -  []  Yes     []  No    Total 20         Functional Scale:  LEFS:  26% (Score:  59/80)   Date assessed:  2021       Latex Allergy:  [x]NO      []YES  Preferred Language for Healthcare:   [x]English       []other:    Pain level:  6/10 when up and moving      SUBJECTIVE:  Doing well. 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 Felt good again on . OBJECTIVE: See eval   Observation:    Test measurements:    2 from zero to 120 flexion 2021   2 from zero to 119 flexion 2021  PROM 2 °  from zero to 120 °  flexion 2021; AROM 2 °  from zero to 125 °   *  12/3/2021 2 from zero to 116 in supine passive and sitting with patient belt   * 2021 0-116  2021 - 0-120    RESTRICTIONS/PRECAUTIONS: Hx HTN, Hyperlipidemia;  Hx stent placment    Exercises/Interventions:   Therapeutic Ex (85366)  Therapeutic Activity (10179)  NMR re-education (95270) Sets/Reps Notes/CUES   Bike 5' L3         Slant Board  HR 3 x 30\"  x30    SLB 10 x 10\" Airex   Standing march X30 airex  Single arm   Mini squat x20 airex    Leg Press 100# x30 DL  80# x20 SL  80# 10 x 10\" Hover    Lateral Step Up 15 x ea 6\"   FSU  2 x 10 6\"    [address] TKE  JUAN standing abd 75# x20  60# x30 R, L    HS curls  Knee extension 40# x30 R, L  10# x20         Quad Sets 15 x 5\"         SAQ 3 x 10 3#   SLR 3 x 10 1#   SL SLR  3 x 101#   LAQ   3#   Bridge 3\" x20    HL clams x20 ea 2 way Green        Heel Slides with belt & slide board 3\" x20    Heel Prop 2 x 2'         Long Sit Gastroc Stretch 3  x  30\"    Long Sit Hamstring Stretch 3  x  30\"     Seated knee flex stretch 3  x 30\"                                                                Manual Intervention ([id number])     A/P tibiofemoral joint mobs 5'    Long axis distraction  3'       PROM Knee Flex/Ext 7'                    Medbridge access code:   Access Code: [email or web address]  URL: [email or web address]  Date: 10/11/2021  Prepared by: Justin Factor    Exercises  Seated Table Hamstring Stretch - 1 x daily - 7 x weekly - 5 reps - 1 sets - 30\" hold  Long Sitting Calf Stretch with Strap - 1 x daily - 7 x weekly - 5 reps - 1 sets - 30\" hold  Supine Quad Set - 1-2 x daily - 7 x weekly - 1-2 sets - 10 reps - 5\" hold  Supine Short Arc Quad - 1 x daily - 7 x weekly - 10 reps - 3 sets  Active Straight Leg Raise with Quad Set - 1 x daily - 7 x weekly - 3 sets - 10 reps  Seated Knee Flexion AAROM - 1 x daily - 7 x weekly - 1 sets - 10 reps - 10\" hold               Patient Education 5' Pt education with HEP and progression of PT along with compliance with HEP to aide with formal PT for optimal outcomes.  Education with proper gait training to improve heel strike and TKE        Therapeutic Exercise and NMR EXR  [x] (89368) Provided verbal/tactile cueing for activities related to strengthening, flexibility, endurance, ROM for improvements in LE, proximal hip, and core control with self care, mobility, lifting, ambulation. [x] (31073) Provided verbal/tactile cueing for activities related to improving balance, coordination, kinesthetic sense, posture, motor skill, proprioception to assist with LE, proximal hip, and core control in self-care, mobility, lifting, ambulation and eccentric single leg control. NMR and Therapeutic Activities:    [x] (01816 or 54303) Provided verbal/tactile cueing for activities related to improving balance, coordination, kinesthetic sense, posture, motor skill, proprioception and motor activation to allow for proper function of core, proximal hip and LE with self-care and ADLs and functional mobility.    [x] (91273) Gait Re-education- Provided training and instruction to the patient for proper LE, core and proximal hip recruitment and positioning and eccentric body weight control with ambulation re-education including up and down stairs     Home Exercise Program:    [x] (50758) Reviewed/Progressed HEP activities related to strengthening, flexibility, endurance, ROM of core, proximal hip and LE for functional self-care, mobility, lifting and ambulation/stair navigation   [x] (49754) Reviewed/Progressed HEP activities related to improving balance, coordination, kinesthetic sense, posture, motor skill, proprioception of core, proximal hip and LE for self-care, mobility, lifting, and ambulation/stair navigation      Manual Treatments:  PROM / STM / Oscillations-Mobs:  G-I, II, III, IV (PA's, Inf., Post.)  [x] (01744) Provided manual therapy to mobilize LE, proximal hip and/or LS spine soft tissue/joints for the purpose of modulating pain, promoting relaxation, increasing ROM, reducing/eliminating soft tissue swelling/inflammation/restriction, improving soft tissue extensibility and allowing for proper ROM for normal function with self-care, mobility, lifting and ambulation. Modalities: Vaso x 10' for edema and pain control with    Charges:  Timed Code Treatment Minutes: 61'   Total Treatment Minutes:  79'   BWC:  TE TIME:  NMR TIME:  MANUAL TIME:  UNTIMED MINUTES:  Medicare Total:    -  -  -  -  Visits (20)  Total: $1980      [] EVAL (LOW) 42713 (typically 20 minutes face-to-face)  [] EVAL (MOD) 54931 (typically 30 minutes face-to-face)  [] EVAL (HIGH) 68029 (typically 45 minutes face-to-face)  [] RE-EVAL     [x] GO(14685) x  2  [] IONTO  [x] NMR (32590) x   1  [] VASO  [x] Manual (41068) x 1    [] Other:  [] TA x      [] Mech Traction (86083)  [] ES(attended) (71715)      [] ES (un) (21624):    ASSESSMENT:  Patient continues to have some notable stiffness on this date. Today's session was focused on improving LE strength and knee joint mobility. GOALS:   Short Term Goals: To be achieved in: 2 weeks  1. Independent in HEP and progression per patient tolerance, in order to prevent re-injury. [] Progressing: [x] Met: [] Not Met: [] Adjusted   2. Patient will have a decrease in pain to facilitate improvement in movement, function, and ADLs as indicated by Functional Deficits. [] Progressing: [x] Met: [] Not Met: [] Adjusted     Long Term Goals: To be achieved in: 12 weeks  1. Disability index score of 20% or less for the LEFS to assist with reaching prior level of function. [x] Progressing: [] Met: [] Not Met: [] Adjusted   2. Patient will demonstrate increased AROM to equal the opposite side bilaterally to allow for proper joint functioning as indicated by patients Functional Deficits. [x] Progressing: [] Met: [] Not Met: [] Adjusted   3. Patient will demonstrate an increase in strength of Left Hip and Knee = Right Hip and Knee to allow for proper functional mobility as indicated by patients Functional Deficits. [x] Progressing: [] Met: [] Not Met: [] Adjusted   4.  Patient will return to all transfers, work activities, and functional activities without increased symptoms or restriction. [x] Progressing: [] Met: [] Not Met: [] Adjusted   5. Patient will have </=1-2/10 pain with ADL's. [x] Progressing: [] Met: [] Not Met: [] Adjusted   6. Patient stated goal: To return to normal walking and functional activity   [x] Progressing: [] Met: [] Not Met: [] Adjusted    Overall Progression Towards Functional goals/ Treatment Progress Update:  [x] Patient is progressing as expected towards functional goals listed. [] Progression is slowed due to complexities/Impairments listed. [] Progression has been slowed due to co-morbidities. [] Plan just implemented, too soon to assess goals progression <30days   [] Goals require adjustment due to lack of progress  [] Patient is not progressing as expected and requires additional follow up with physician  [] Other    Prognosis for POC: [x] Good [] Fair  [] Poor    Patient requires continued skilled intervention: [x] Yes  [] No    Treatment/Activity Tolerance:  [x] Patient able to complete treatment  [] Patient limited by fatigue  [] Patient limited by pain    [] Patient limited by other medical complications  [] Other:     Return to Play: (if applicable)   []  Stage 1: Intro to Strength   []  Stage 2: Return to Run and Strength   []  Stage 3: Return to Jump and Strength   []  Stage 4: Dynamic Strength and Agility   []  Stage 5: Sport Specific Training     []  Ready to Return to Play, Meets All Above Stages   []  Not Ready for Return to Sports   Comments:                         PLAN:   [x] Continue per plan of care [] Alter current plan (see comments above)  [] Plan of care initiated [] Hold pending MD visit [] Discharge    Electronically signed by:  Clinton Tafoya PTA    Note: If patient does not return for scheduled/ recommended follow up visits, this note will serve as a discharge from care along with most recent update on progress.

## 2021-12-22 ENCOUNTER — HOSPITAL ENCOUNTER (OUTPATIENT)
Dept: PHYSICAL THERAPY | Age: 71
Setting detail: THERAPIES SERIES
Discharge: HOME | End: 2021-12-22
Payer: MEDICARE

## 2021-12-22 PROCEDURE — 97140 MANUAL THERAPY 1/> REGIONS: CPT | Performed by: PHYSICAL THERAPY ASSISTANT

## 2021-12-22 PROCEDURE — 97016 VASOPNEUMATIC DEVICE THERAPY: CPT | Performed by: PHYSICAL THERAPY ASSISTANT

## 2021-12-22 NOTE — FLOWSHEET NOTE
[facility], [address] [facility] [place], [address]  Phone: [phone], [phone]      Physical Therapy Treatment Note/ Progress Report:     Date:  2021    Patient Name:  Jenny Santos    :  1950  MRN: [id number]  Restrictions/Precautions:    Medical/Treatment Diagnosis Information:  · Diagnosis: Left Knee OA s/p Left TKA 10/6/2021  · Treatment Diagnosis: C45.65  Insurance/Certification information:  PT Insurance Information: Medicare  Physician Information:  Referring Practitioner: Pebbles Samayoa  Has the plan of care been signed (Y/N):        []  Yes  [x]  No     Date of Patient follow up with Physician: Dec 2, 2021    Is this a Progress Report:     []  Yes  [x]  No      If Yes:  Date Range for reporting period:    Beginnin2021 ====Endin2021    Progress report will be due (10 Rx or 30 days whichever is less):      Recertification will be due (POC Duration  / 90 days whichever is less): 2022      Visit # Insurance Allowable Auth Required   In Person [address] [place] [address] []  Yes     []  No    Tele Health -  []  Yes     []  No    Total 21         Functional Scale:  LEFS:  26% (Score:  59/80)   Date assessed:  2021       Latex Allergy:  [x]NO      []YES  Preferred Language for Healthcare:   [x]English       []other:    Pain level:  6/10 when up and moving      SUBJECTIVE:  Doing well. OBJECTIVE: See eval   Observation:    Test measurements:    2 from zero to 120 flexion 2021   2 from zero to 119 flexion 2021  PROM 2 °  from zero to 120 °  flexion 2021; AROM 2 °  from zero to 125 °   *  12/3/2021 2 from zero to 116 in supine passive and sitting with patient belt   * 2021 0-116  2021 - 0-120    RESTRICTIONS/PRECAUTIONS: Hx HTN, Hyperlipidemia;  Hx stent placment    Exercises/Interventions:   Therapeutic Ex (76342)  Therapeutic Activity (80650)  NMR re-education (03300) Sets/Reps Notes/CUES   Bike 5' L3 Slant Board  HR 3 x 30\"  x30    SLB 10 x 10\" Airex   Standing march X30 airex  Single arm   Mini squat x20 airex    Leg Press 100# x30 DL  80# x20 SL  80# 10 x 10\" Hover    Lateral Step Up 15 x ea 6\"   FSU  2 x 10 6\"    [address] TKE  JUAN standing abd 75# x20  60# x30 R, L    HS curls  Knee extension 40# x30 R, L  10# x20         Quad Sets 15 x 5\"         SAQ 3 x 10 3#   SLR 3 x 10 1#   SL SLR  3 x 101#   LAQ   3#   Bridge 3\" x20    HL clams x20 ea 2 way Green        Heel Slides with belt & slide board 3\" x20    Heel Prop 2 x 2'         Long Sit Gastroc Stretch 3  x  30\"    Long Sit Hamstring Stretch 3  x  30\"     Seated knee flex stretch 3  x 30\"                                                                Manual Intervention (33353)     A/P tibiofemoral joint mobs 5'    Long axis distraction  3'       PROM Knee Flex/Ext 7'                    Medbridge access code:   Access Code: [email or web address]  URL: [email or web address]  Date: 10/11/2021  Prepared by: Silvia Montgomery    Exercises  Seated Table Hamstring Stretch - 1 x daily - 7 x weekly - 5 reps - 1 sets - 30\" hold  Long Sitting Calf Stretch with Strap - 1 x daily - 7 x weekly - 5 reps - 1 sets - 30\" hold  Supine Quad Set - 1-2 x daily - 7 x weekly - 1-2 sets - 10 reps - 5\" hold  Supine Short Arc Quad - 1 x daily - 7 x weekly - 10 reps - 3 sets  Active Straight Leg Raise with Quad Set - 1 x daily - 7 x weekly - 3 sets - 10 reps  Seated Knee Flexion AAROM - 1 x daily - 7 x weekly - 1 sets - 10 reps - 10\" hold               Patient Education 5' Pt education with HEP and progression of PT along with compliance with HEP to aide with formal PT for optimal outcomes.  Education with proper gait training to improve heel strike and TKE        Therapeutic Exercise and NMR EXR  [x] (20444) Provided verbal/tactile cueing for activities related to strengthening, flexibility, endurance, ROM for improvements in LE, proximal hip, and core control with self care, mobility, lifting, ambulation. [x] (70761) Provided verbal/tactile cueing for activities related to improving balance, coordination, kinesthetic sense, posture, motor skill, proprioception to assist with LE, proximal hip, and core control in self-care, mobility, lifting, ambulation and eccentric single leg control. NMR and Therapeutic Activities:    [x] (43281 or 32219) Provided verbal/tactile cueing for activities related to improving balance, coordination, kinesthetic sense, posture, motor skill, proprioception and motor activation to allow for proper function of core, proximal hip and LE with self-care and ADLs and functional mobility. [x] (46100) Gait Re-education- Provided training and instruction to the patient for proper LE, core and proximal hip recruitment and positioning and eccentric body weight control with ambulation re-education including up and down stairs     Home Exercise Program:    [x] (11164) Reviewed/Progressed HEP activities related to strengthening, flexibility, endurance, ROM of core, proximal hip and LE for functional self-care, mobility, lifting and ambulation/stair navigation   [x] (84352) Reviewed/Progressed HEP activities related to improving balance, coordination, kinesthetic sense, posture, motor skill, proprioception of core, proximal hip and LE for self-care, mobility, lifting, and ambulation/stair navigation      Manual Treatments:  PROM / STM / Oscillations-Mobs:  G-I, II, III, IV (PA's, Inf., Post.)  [x] (49794) Provided manual therapy to mobilize LE, proximal hip and/or LS spine soft tissue/joints for the purpose of modulating pain, promoting relaxation, increasing ROM, reducing/eliminating soft tissue swelling/inflammation/restriction, improving soft tissue extensibility and allowing for proper ROM for normal function with self-care, mobility, lifting and ambulation.      Modalities: Vaso x 10' for edema and pain control with    Charges:  Timed Code Treatment Minutes: 30   Total Treatment Minutes:  79'   BWC:  TE TIME:  NMR TIME:  MANUAL TIME:  UNTIMED MINUTES:  Medicare Total:    -  -  -  -  Visits (21)  Total: $2080      [] EVAL (LOW) 75356 (typically 20 minutes face-to-face)  [] EVAL (MOD) 68681 (typically 30 minutes face-to-face)  [] EVAL (HIGH) 48255 (typically 45 minutes face-to-face)  [] RE-EVAL     [] KU(00809) x    [] IONTO  [] NMR (64233) x     [x] VASO 1  [x] Manual (91148) x 1    [] Other:  [] TA x      [] Mech Traction (67621)  [] ES(attended) (98700)      [] ES (un) (67489):    ASSESSMENT:  Patient continues to have some notable stiffness on this date. Today's session was focused on improving LE strength and knee joint mobility. GOALS:   Short Term Goals: To be achieved in: 2 weeks  1. Independent in HEP and progression per patient tolerance, in order to prevent re-injury. [] Progressing: [x] Met: [] Not Met: [] Adjusted   2. Patient will have a decrease in pain to facilitate improvement in movement, function, and ADLs as indicated by Functional Deficits. [] Progressing: [x] Met: [] Not Met: [] Adjusted     Long Term Goals: To be achieved in: 12 weeks  1. Disability index score of 20% or less for the LEFS to assist with reaching prior level of function. [x] Progressing: [] Met: [] Not Met: [] Adjusted   2. Patient will demonstrate increased AROM to equal the opposite side bilaterally to allow for proper joint functioning as indicated by patients Functional Deficits. [x] Progressing: [] Met: [] Not Met: [] Adjusted   3. Patient will demonstrate an increase in strength of Left Hip and Knee = Right Hip and Knee to allow for proper functional mobility as indicated by patients Functional Deficits. [x] Progressing: [] Met: [] Not Met: [] Adjusted   4. Patient will return to all transfers, work activities, and functional activities without increased symptoms or restriction. [x] Progressing: [] Met: [] Not Met: [] Adjusted   5. Patient will have </=1-2/10 pain with ADL's.   [x] Progressing: [] Met: [] Not Met: [] Adjusted   6. Patient stated goal: To return to normal walking and functional activity   [x] Progressing: [] Met: [] Not Met: [] Adjusted    Overall Progression Towards Functional goals/ Treatment Progress Update:  [x] Patient is progressing as expected towards functional goals listed. [] Progression is slowed due to complexities/Impairments listed. [] Progression has been slowed due to co-morbidities. [] Plan just implemented, too soon to assess goals progression <30days   [] Goals require adjustment due to lack of progress  [] Patient is not progressing as expected and requires additional follow up with physician  [] Other    Prognosis for POC: [x] Good [] Fair  [] Poor    Patient requires continued skilled intervention: [x] Yes  [] No    Treatment/Activity Tolerance:  [x] Patient able to complete treatment  [] Patient limited by fatigue  [] Patient limited by pain    [] Patient limited by other medical complications  [] Other:     Return to Play: (if applicable)   []  Stage 1: Intro to Strength   []  Stage 2: Return to Run and Strength   []  Stage 3: Return to Jump and Strength   []  Stage 4: Dynamic Strength and Agility   []  Stage 5: Sport Specific Training     []  Ready to Return to Play, Meets All Above Stages   []  Not Ready for Return to Sports   Comments:                         PLAN:   [x] Continue per plan of care [] Alter current plan (see comments above)  [] Plan of care initiated [] Hold pending MD visit [] Discharge    Electronically signed by:  Liliana Woody PTA    Note: If patient does not return for scheduled/ recommended follow up visits, this note will serve as a discharge from care along with most recent update on progress.

## 2021-12-22 NOTE — FLOWSHEET NOTE
[address], [address] [facility] [address], [address] [place] [address]  Phone: [phone]  Fax [phone]      ATHLETIC TRAINING AREA ACTIVITY SHEET  Date:  2021    Patient Name:  Mj Womack    :  1950  MRN: [id number]  Restrictions/Precautions:    Medical/Treatment Diagnosis Information:  ·   Diagnosis: Left Knee OA s/p Left TKA 10/6/2021  · Treatment Diagnosis: M17.12     Physician Information:    Referring Practitioner: Lamar Loomis    Weeks Post-op  8 wks  12 wks 16 wks 20 wks   24 wks                            Activity Log                                                  DOS/DOI: 10/6/2021                                                   Date: 21   ATC communication Feels a little stiff but doing ok    Bike 5 mins level 3            Quad Sets 5\" x 15   SAQ 3 x 10   SLR 3 X 10   Sl SLR 3 x 10   Bridge 3\" X 20   HL Clams GTB X 20 each   Heel Slides  3\" x 20   Heel Prop  2 x 2\"   Long Sit Calf stretch 3 x 30\"   Long Sit Hamstring  3 x 30\"   Seated Knee Flex stretch 3 x 30\"       Slant Board stretch 3 x 30\"       HR X 30       Standing march X 30 airex       Balance: PEP/Lara board                   SLS 10 x 10\"         Star excursion load/explode          Extremity reach UE/LE        Leg Press Socrates. 100# x 30                     Hover 80# 10 x 10\"                     Inv. 80# x 20       Calf Press Socrates. Ecc.                        Inv.        JUAN   Flex               ABd 60# x 30              ADd              TKE 75# x 20              Ext        Steps Up 6\" x 20              Up and Over               Down               Lateral 6\" X 15 each              Rotation        Squats  mini X 20                 wall                 BOSU         Lunges:  Lunge to Balance                   Balance to Lunge                   Walking        Knee Extension Bilat.                  10# x 20                              Ecc. Inv.        Hamstring Curls Bilat. 40# x 30                              Ecc.                               Inv.        Soleus Press Bilat.                            Ecc.                           Inv.                        Modality                                           Electronically signed by: Darío Mckoy ATC

## 2021-12-27 ENCOUNTER — HOSPITAL ENCOUNTER (OUTPATIENT)
Dept: PHYSICAL THERAPY | Age: 71
Setting detail: THERAPIES SERIES
Discharge: HOME | End: 2021-12-27
Payer: MEDICARE

## 2021-12-27 PROCEDURE — 97110 THERAPEUTIC EXERCISES: CPT | Performed by: PHYSICAL THERAPY ASSISTANT

## 2021-12-27 PROCEDURE — 97140 MANUAL THERAPY 1/> REGIONS: CPT | Performed by: PHYSICAL THERAPY ASSISTANT

## 2021-12-27 PROCEDURE — 97112 NEUROMUSCULAR REEDUCATION: CPT | Performed by: PHYSICAL THERAPY ASSISTANT

## 2021-12-27 NOTE — FLOWSHEET NOTE
Formerly [facility], [facility], [address] [place] [address] Halina Davila [address], [address]  Phone: [phone], [phone]      Physical Therapy Treatment Note/ Progress Report:     Date:  2021    Patient Name:  Alcides Casillas    :  1950  MRN: [id number]  Restrictions/Precautions:    Medical/Treatment Diagnosis Information:  · Diagnosis: Left Knee OA s/p Left TKA 10/6/2021  · Treatment Diagnosis: R38.20  Insurance/Certification information:  PT Insurance Information: Medicare  Physician Information:  Referring Practitioner: Da Tom  Has the plan of care been signed (Y/N):        []  Yes  [x]  No     Date of Patient follow up with Physician: Dec 2, 2021    Is this a Progress Report:     []  Yes  [x]  No      If Yes:  Date Range for reporting period:    Beginnin2021 ====Endin2021    Progress report will be due (10 Rx or 30 days whichever is less): 6454     Recertification will be due (POC Duration  / 90 days whichever is less): 2022      Visit # Insurance Allowable Auth Required   In Person [address] []  Yes     []  No    Tele Health -  []  Yes     []  No    Total 22         Functional Scale:  LEFS:  26% (Score:  59/80)   Date assessed:  2021       Latex Allergy:  [x]NO      []YES  Preferred Language for Healthcare:   [x]English       []other:    Pain level:  6/10 when up and moving      SUBJECTIVE:  Doing well. OBJECTIVE: See eval   Observation:    Test measurements:    2 from zero to 120 flexion 2021   2 from zero to 119 flexion 2021  PROM 2 °  from zero to 120 °  flexion 2021; AROM 2 °  from zero to 125 °   *  12/3/2021 2 from zero to 116 in supine passive and sitting with patient belt   * 2021 0-116  2021 - 0-120    RESTRICTIONS/PRECAUTIONS: Hx HTN, Hyperlipidemia;  Hx stent placment    Exercises/Interventions:   Therapeutic Ex (16930)  Therapeutic Activity (32337)  NMR re-education (69745) Sets/Reps Notes/CUES   Bike 5' L3 Slant Board  HR 3 x 30\"  x30    SLB 10 x 10\" Airex   Standing march X30 airex  Single arm   Mini squat x20 airex    Leg Press 100# x30 DL  80# x20 SL  80# 10 x 10\" Hover    Lateral Step Up 15 x ea 6\"   FSU  2 x 10 6\"    [address] TKE  JUAN standing abd 75# x20  60# x30 R, L    HS curls  Knee extension 40# x30 R, L  10# x20         Quad Sets 15 x 5\"         SAQ 3 x 10 3#   SLR 3 x 10 2#   SL SLR  3 x 102#   LAQ   3#   Bridge 3\" x20    HL clams x20 ea 2 way Green        Heel Slides with belt & slide board 3\" x20    Heel Prop 2 x 2'         Long Sit Gastroc Stretch 3  x  30\"    Long Sit Hamstring Stretch 3  x  30\"     Seated knee flex stretch 3  x 30\"                                                                Manual Intervention (02051)     A/P tibiofemoral joint mobs 5'    Long axis distraction  3'       PROM Knee Flex/Ext 7'                    Medbridge access code:   Access Code: [email or web address]  URL: [email or web address]  Date: 10/11/2021  Prepared by: Janaeia Cool    Exercises  Seated Table Hamstring Stretch - 1 x daily - 7 x weekly - 5 reps - 1 sets - 30\" hold  Long Sitting Calf Stretch with Strap - 1 x daily - 7 x weekly - 5 reps - 1 sets - 30\" hold  Supine Quad Set - 1-2 x daily - 7 x weekly - 1-2 sets - 10 reps - 5\" hold  Supine Short Arc Quad - 1 x daily - 7 x weekly - 10 reps - 3 sets  Active Straight Leg Raise with Quad Set - 1 x daily - 7 x weekly - 3 sets - 10 reps  Seated Knee Flexion AAROM - 1 x daily - 7 x weekly - 1 sets - 10 reps - 10\" hold               Patient Education 5' Pt education with HEP and progression of PT along with compliance with HEP to aide with formal PT for optimal outcomes.  Education with proper gait training to improve heel strike and TKE        Therapeutic Exercise and NMR EXR  [x] (23021) Provided verbal/tactile cueing for activities related to strengthening, flexibility, endurance, ROM for improvements in LE, proximal hip, and core control with self care, mobility, lifting, ambulation. [x] (36632) Provided verbal/tactile cueing for activities related to improving balance, coordination, kinesthetic sense, posture, motor skill, proprioception to assist with LE, proximal hip, and core control in self-care, mobility, lifting, ambulation and eccentric single leg control. NMR and Therapeutic Activities:    [x] (47820 or 72604) Provided verbal/tactile cueing for activities related to improving balance, coordination, kinesthetic sense, posture, motor skill, proprioception and motor activation to allow for proper function of core, proximal hip and LE with self-care and ADLs and functional mobility. [x] (83298) Gait Re-education- Provided training and instruction to the patient for proper LE, core and proximal hip recruitment and positioning and eccentric body weight control with ambulation re-education including up and down stairs     Home Exercise Program:    [x] (51945) Reviewed/Progressed HEP activities related to strengthening, flexibility, endurance, ROM of core, proximal hip and LE for functional self-care, mobility, lifting and ambulation/stair navigation   [x] (66544) Reviewed/Progressed HEP activities related to improving balance, coordination, kinesthetic sense, posture, motor skill, proprioception of core, proximal hip and LE for self-care, mobility, lifting, and ambulation/stair navigation      Manual Treatments:  PROM / STM / Oscillations-Mobs:  G-I, II, III, IV (PA's, Inf., Post.)  [x] (14240) Provided manual therapy to mobilize LE, proximal hip and/or LS spine soft tissue/joints for the purpose of modulating pain, promoting relaxation, increasing ROM, reducing/eliminating soft tissue swelling/inflammation/restriction, improving soft tissue extensibility and allowing for proper ROM for normal function with self-care, mobility, lifting and ambulation.      Modalities: Vaso x 10' for edema and pain control with    Charges:  Timed Code Treatment Minutes: 60   Total Treatment ADL's.  [x] Progressing: [] Met: [] Not Met: [] Adjusted   6. Patient stated goal: To return to normal walking and functional activity   [x] Progressing: [] Met: [] Not Met: [] Adjusted    Overall Progression Towards Functional goals/ Treatment Progress Update:  [x] Patient is progressing as expected towards functional goals listed. [] Progression is slowed due to complexities/Impairments listed. [] Progression has been slowed due to co-morbidities. [] Plan just implemented, too soon to assess goals progression <30days   [] Goals require adjustment due to lack of progress  [] Patient is not progressing as expected and requires additional follow up with physician  [] Other    Prognosis for POC: [x] Good [] Fair  [] Poor    Patient requires continued skilled intervention: [x] Yes  [] No    Treatment/Activity Tolerance:  [x] Patient able to complete treatment  [] Patient limited by fatigue  [] Patient limited by pain    [] Patient limited by other medical complications  [] Other:     Return to Play: (if applicable)   []  Stage 1: Intro to Strength   []  Stage 2: Return to Run and Strength   []  Stage 3: Return to Jump and Strength   []  Stage 4: Dynamic Strength and Agility   []  Stage 5: Sport Specific Training     []  Ready to Return to Play, Meets All Above Stages   []  Not Ready for Return to Sports   Comments:                         PLAN:   [x] Continue per plan of care [] Alter current plan (see comments above)  [] Plan of care initiated [] Hold pending MD visit [] Discharge    Electronically signed by:  Isac Arredondo PTA    Note: If patient does not return for scheduled/ recommended follow up visits, this note will serve as a discharge from care along with most recent update on progress.

## 2021-12-29 ENCOUNTER — HOSPITAL ENCOUNTER (OUTPATIENT)
Dept: PHYSICAL THERAPY | Age: 71
Setting detail: THERAPIES SERIES
Discharge: HOME | End: 2021-12-29
Payer: MEDICARE

## 2021-12-29 PROCEDURE — 97110 THERAPEUTIC EXERCISES: CPT

## 2021-12-29 PROCEDURE — 97140 MANUAL THERAPY 1/> REGIONS: CPT

## 2021-12-29 PROCEDURE — 97112 NEUROMUSCULAR REEDUCATION: CPT

## 2021-12-29 NOTE — FLOWSHEET NOTE
[facility], [address] [place] [address] Halina Davila 43, [address]  Phone: [phone], [phone]      Physical Therapy Treatment Note/ Progress Report:     Date:  2021    Patient Name:  Boom Soliman    :  1950  MRN: [id number]  Restrictions/Precautions:    Medical/Treatment Diagnosis Information:  · Diagnosis: Left Knee OA s/p Left TKA 10/6/2021  · Treatment Diagnosis: D39.52  Insurance/Certification information:  PT Insurance Information: Medicare  Physician Information:  Referring Practitioner: Glenn Urbina  Has the plan of care been signed (Y/N):        []  Yes  [x]  No     Date of Patient follow up with Physician: Dec 2, 2021    Is this a Progress Report:     []  Yes  [x]  No      If Yes:  Date Range for reporting period:    Beginnin2021 ====Endin2021    Progress report will be due (10 Rx or 30 days whichever is less):      Recertification will be due (POC Duration  / 90 days whichever is less): 2022      Visit # Insurance Allowable Auth Required   In Person [address] [facility] []  Yes     []  No    Tele Health -  []  Yes     []  No    Total 23         Functional Scale:  LEFS:  26% (Score:  59/80)   Date assessed:  2021       Latex Allergy:  [x]NO      []YES  Preferred Language for Healthcare:   [x]English       []other:    Pain level:  6/10 when up and moving      SUBJECTIVE: Patient reports he was a little sore after last time, especially in the front of the knee along the front. Other than that, no complaints. Needs PN next week     OBJECTIVE: See eval   Observation:    Test measurements:    2 from zero to 120 flexion 2021   2 from zero to 119 flexion 2021  PROM 2 °  from zero to 120 °  flexion 2021; AROM 2 °  from zero to 125 °   *  12/3/2021 2 from zero to 116 in supine passive and sitting with patient belt   * 2021 0-116  2021 - 0-123    RESTRICTIONS/PRECAUTIONS: Hx HTN, Hyperlipidemia;  Hx stent placment    Exercises/Interventions:   Therapeutic Ex (73362)  Therapeutic Activity (10008)  NMR re-education (59162) Sets/Reps Notes/CUES   Bike 5' L3         Slant Board  HR 3 x 30\"  x30    SLB 10 x 10\" Airex   Standing march X30 airex  Single arm   Mini squat x20 airex    Leg Press 100# x30 DL  80# x20 SL  80# 10 x 10\" Hover    Lateral Step Up 15 x ea 6\"   FSU  2 x 10 6\"    [facility] TKE  JUAN standing abd 75# x20  60# x30 R, L    HS curls  Knee extension 40# x30 R, L  10# x20         Quad Sets 15 x 5\"         SAQ 3 x 10 3#   SLR 3 x 10 2#   SL SLR  3 x 102#   LAQ   3#   Bridge 3\" x20    HL clams x20 ea 2 way Green        Heel Slides with belt & slide board 3\" x20    Heel Prop 2 x 2'         Long Sit Gastroc Stretch 3  x  30\"    Long Sit Hamstring Stretch 3  x  30\"     Seated knee flex stretch 3  x 30\"                                                                Manual Intervention (95667)     A/P tibiofemoral joint mobs 3'    Long axis distraction         PROM Knee Flex/Ext 8'                    Medbridge access code:   Access Code: [email or web address]  URL: [email or web address]  Date: 10/11/2021  Prepared by: Rochelle Norris    Exercises  Seated Table Hamstring Stretch - 1 x daily - 7 x weekly - 5 reps - 1 sets - 30\" hold  Long Sitting Calf Stretch with Strap - 1 x daily - 7 x weekly - 5 reps - 1 sets - 30\" hold  Supine Quad Set - 1-2 x daily - 7 x weekly - 1-2 sets - 10 reps - 5\" hold  Supine Short Arc Quad - 1 x daily - 7 x weekly - 10 reps - 3 sets  Active Straight Leg Raise with Quad Set - 1 x daily - 7 x weekly - 3 sets - 10 reps  Seated Knee Flexion AAROM - 1 x daily - 7 x weekly - 1 sets - 10 reps - 10\" hold               Patient Education 5' Pt education with HEP and progression of PT along with compliance with HEP to aide with formal PT for optimal outcomes.  Education with proper gait training to improve heel strike and TKE        Therapeutic Exercise and NMR EXR  [x] (25974) Provided verbal/tactile cueing for activities related to strengthening, flexibility, endurance, ROM for improvements in LE, proximal hip, and core control with self care, mobility, lifting, ambulation. [x] (51766) Provided verbal/tactile cueing for activities related to improving balance, coordination, kinesthetic sense, posture, motor skill, proprioception to assist with LE, proximal hip, and core control in self-care, mobility, lifting, ambulation and eccentric single leg control. NMR and Therapeutic Activities:    [x] (91988 or 76511) Provided verbal/tactile cueing for activities related to improving balance, coordination, kinesthetic sense, posture, motor skill, proprioception and motor activation to allow for proper function of core, proximal hip and LE with self-care and ADLs and functional mobility.    [x] (93576) Gait Re-education- Provided training and instruction to the patient for proper LE, core and proximal hip recruitment and positioning and eccentric body weight control with ambulation re-education including up and down stairs     Home Exercise Program:    [x] (71881) Reviewed/Progressed HEP activities related to strengthening, flexibility, endurance, ROM of core, proximal hip and LE for functional self-care, mobility, lifting and ambulation/stair navigation   [x] (93576) Reviewed/Progressed HEP activities related to improving balance, coordination, kinesthetic sense, posture, motor skill, proprioception of core, proximal hip and LE for self-care, mobility, lifting, and ambulation/stair navigation      Manual Treatments:  PROM / STM / Oscillations-Mobs:  G-I, II, III, IV (PA's, Inf., Post.)  [x] (59636) Provided manual therapy to mobilize LE, proximal hip and/or LS spine soft tissue/joints for the purpose of modulating pain, promoting relaxation, increasing ROM, reducing/eliminating soft tissue swelling/inflammation/restriction, improving soft tissue extensibility and allowing for proper ROM for normal function with self-care, mobility, lifting and ambulation. Modalities: Vaso x 10' for edema and pain control with    Charges:  Timed Code Treatment Minutes: 60   Total Treatment Minutes:  70'   BWC:  TE TIME:  NMR TIME:  MANUAL TIME:  UNTIMED MINUTES:  Medicare Total:    -  -  -  -  Visits (22)  Total: $2210      [] EVAL (LOW) 96424 (typically 20 minutes face-to-face)  [] EVAL (MOD) 24011 (typically 30 minutes face-to-face)  [] EVAL (HIGH) 55888 (typically 45 minutes face-to-face)  [] RE-EVAL     [x] JM(54762) x 2   [] IONTO  [x] NMR (78599) x   1  [x] VASO 1  [x] Manual (89460) x 1    [] Other:  [] TA x      [] Mech Traction (88074)  [] ES(attended) (00634)      [] ES (un) (76941):    ASSESSMENT:  Patient continues to struggle with terminal knee extension. He demonstrates some ER at the hip, which makes TKE more difficult. GOALS:   Short Term Goals: To be achieved in: 2 weeks  1. Independent in HEP and progression per patient tolerance, in order to prevent re-injury. [] Progressing: [x] Met: [] Not Met: [] Adjusted   2. Patient will have a decrease in pain to facilitate improvement in movement, function, and ADLs as indicated by Functional Deficits. [] Progressing: [x] Met: [] Not Met: [] Adjusted     Long Term Goals: To be achieved in: 12 weeks  1. Disability index score of 20% or less for the LEFS to assist with reaching prior level of function. [x] Progressing: [] Met: [] Not Met: [] Adjusted   2. Patient will demonstrate increased AROM to equal the opposite side bilaterally to allow for proper joint functioning as indicated by patients Functional Deficits. [x] Progressing: [] Met: [] Not Met: [] Adjusted   3. Patient will demonstrate an increase in strength of Left Hip and Knee = Right Hip and Knee to allow for proper functional mobility as indicated by patients Functional Deficits. [x] Progressing: [] Met: [] Not Met: [] Adjusted   4.  Patient will return to all transfers, work activities, and functional activities without increased symptoms or restriction. [x] Progressing: [] Met: [] Not Met: [] Adjusted   5. Patient will have </=1-2/10 pain with ADL's. [x] Progressing: [] Met: [] Not Met: [] Adjusted   6. Patient stated goal: To return to normal walking and functional activity   [x] Progressing: [] Met: [] Not Met: [] Adjusted    Overall Progression Towards Functional goals/ Treatment Progress Update:  [x] Patient is progressing as expected towards functional goals listed. [] Progression is slowed due to complexities/Impairments listed. [] Progression has been slowed due to co-morbidities. [] Plan just implemented, too soon to assess goals progression <30days   [] Goals require adjustment due to lack of progress  [] Patient is not progressing as expected and requires additional follow up with physician  [] Other    Prognosis for POC: [x] Good [] Fair  [] Poor    Patient requires continued skilled intervention: [x] Yes  [] No    Treatment/Activity Tolerance:  [x] Patient able to complete treatment  [] Patient limited by fatigue  [] Patient limited by pain    [] Patient limited by other medical complications  [] Other:     Return to Play: (if applicable)   []  Stage 1: Intro to Strength   []  Stage 2: Return to Run and Strength   []  Stage 3: Return to Jump and Strength   []  Stage 4: Dynamic Strength and Agility   []  Stage 5: Sport Specific Training     []  Ready to Return to Play, Meets All Above Stages   []  Not Ready for Return to Sports   Comments:                         PLAN:   [x] Continue per plan of care [] Alter current plan (see comments above)  [] Plan of care initiated [] Hold pending MD visit [] Discharge    Electronically signed by:  Edna Schroeder PT    Note: If patient does not return for scheduled/ recommended follow up visits, this note will serve as a discharge from care along with most recent update on progress.

## 2022-01-03 ENCOUNTER — HOSPITAL ENCOUNTER (OUTPATIENT)
Dept: PHYSICAL THERAPY | Age: 72
Setting detail: THERAPIES SERIES
Discharge: HOME | End: 2022-01-03
Payer: MEDICARE

## 2022-01-03 PROCEDURE — 97140 MANUAL THERAPY 1/> REGIONS: CPT | Performed by: PHYSICAL THERAPY ASSISTANT

## 2022-01-03 PROCEDURE — 97112 NEUROMUSCULAR REEDUCATION: CPT | Performed by: PHYSICAL THERAPY ASSISTANT

## 2022-01-03 PROCEDURE — 97110 THERAPEUTIC EXERCISES: CPT | Performed by: PHYSICAL THERAPY ASSISTANT

## 2022-01-03 NOTE — FLOWSHEET NOTE
[facility], [address] [place], [address]  Phone: [phone], [phone]    Date: 1/3/2022          Patient Name; :  Rosanne Carballo; 1950   · Dx/ICD Code: Diagnosis: Left Knee OA s/p Left TKA 10/6/2021  · Treatment Diagnosis: M17.12      Physician: Anders Millan        Total PT Visits: 24     Measures Previous Current   Pain (0-10) 10/10 3-4/10   Disability % LEFS: 75% LEFS: 19%   ROM Lacking 11-94  0-123             Strength                 Specific Functional Improvements & Impressions:  Zoraida Whitley is progressing well with ROM, strength and functional activities. He does continue to ernandez stiffness. He gets intermittent twinges of pain if he is up for prolonged periods. 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 He is on a medication for his neuropathy. He uses ice for any breakthrough discomfort which helps him manage his symptoms. Plan & Recommendations:  [x] Continue rehabilitation due to objective improvement and continued functional deficits with frequency and duration:1- times a week for 3-4 weeks to improve stiffness and endurance  [] Progress toward  []GAP, []Work Conditioning, []Independent HEP   [] Discharge due to   [] All goals achieved, [] Maximized \"medical necessity\" [] No subjective or objective improvements      Electronically signed by:  Carolyn Bui PTA; Haydee Meza PT,MPT,ATC, cert DN        Therapy Plan of Care Re-Certification  This patient has been re-evaluated for physical therapy services and for therapy to continue, Medicare, Medicaid and other insurances require periodic physician review of the treatment plan.  Please review the above re-evaluation and verify that you agree with plan of care as established above by signing the attached document and return it to our office or note changes to established plan below  [] Follow treatment plan as above [] Discontinue physical eval   Observation:    Test measurements:    2 from zero to 120 flexion 11/11/2021   2 from zero to 119 flexion 11/19/2021  PROM 2 °  from zero to 120 °  flexion 11/29/2021; AROM 2 °  from zero to 125 °   *  12/3/2021 2 from zero to 116 in supine passive and sitting with patient belt   * 12/6/2021 0-116  12/29/2021 - 0-123    RESTRICTIONS/PRECAUTIONS: Hx HTN, Hyperlipidemia; Hx stent placment    Exercises/Interventions:   Therapeutic Ex (66289)  Therapeutic Activity (64513)  NMR re-education (51915) Sets/Reps Notes/CUES   Bike 5' L3         Slant Board  HR 3 x 30\"  x30    SLB 10 x 10\" Airex   Standing march X30 airex  Single arm        Step and hold  5\" x10 for and x10 lateral         Mini squat x20 airex    Leg Press 100# x30 DL  80# x20 SL  80# 10 x 10\" Hover    Lateral Step Up 20 x ea 6\"   FSU  2 x 10 6\"    [facility] [place] TKE  JUAN standing abd 75# x20  60# x30 R, L    HS curls  Knee extension 40# x30 R, L  10# x20         Quad Sets 15 x 5\"         SAQ 3 x 10 3#   SLR 3 x 10 2#   SL SLR  3 x 102#   LAQ   3#   Bridge 3\" x20    HL clams x20 ea 2 way Green        Heel Slides with belt & slide board 3\" x20    Heel Prop 2 x 2'         Long Sit Gastroc Stretch 3  x  30\"    Long Sit Hamstring Stretch 3  x  30\"     Seated knee flex stretch 3  x 30\"                                                                Manual Intervention (74851)     A/P tibiofemoral joint mobs 3'    Long axis distraction         PROM Knee Flex/Ext 8'                    Medbridge access code:   Access Code: [email or web address]  URL: [email or web address]  Date: 10/11/2021  Prepared by: Liane Joseph    Exercises  Seated Table Hamstring Stretch - 1 x daily - 7 x weekly - 5 reps - 1 sets - 30\" hold  Long Sitting Calf Stretch with Strap - 1 x daily - 7 x weekly - 5 reps - 1 sets - 30\" hold  Supine Quad Set - 1-2 x daily - 7 x weekly - 1-2 sets - 10 reps - 5\" hold  Supine Short Arc Quad - 1 x daily - 7 x weekly - 10 reps - 3 sets  Active Straight Leg Raise with Quad Set - 1 x daily - 7 x weekly - 3 sets - 10 reps  Seated Knee Flexion AAROM - 1 x daily - 7 x weekly - 1 sets - 10 reps - 10\" hold               Patient Education 5' Pt education with HEP and progression of PT along with compliance with HEP to aide with formal PT for optimal outcomes. Education with proper gait training to improve heel strike and TKE        Therapeutic Exercise and NMR EXR  [x] (80777) Provided verbal/tactile cueing for activities related to strengthening, flexibility, endurance, ROM for improvements in LE, proximal hip, and core control with self care, mobility, lifting, ambulation. [x] (38247) Provided verbal/tactile cueing for activities related to improving balance, coordination, kinesthetic sense, posture, motor skill, proprioception to assist with LE, proximal hip, and core control in self-care, mobility, lifting, ambulation and eccentric single leg control. NMR and Therapeutic Activities:    [x] (53015 or 39242) Provided verbal/tactile cueing for activities related to improving balance, coordination, kinesthetic sense, posture, motor skill, proprioception and motor activation to allow for proper function of core, proximal hip and LE with self-care and ADLs and functional mobility.    [x] (45911) Gait Re-education- Provided training and instruction to the patient for proper LE, core and proximal hip recruitment and positioning and eccentric body weight control with ambulation re-education including up and down stairs     Home Exercise Program:    [x] (39963) Reviewed/Progressed HEP activities related to strengthening, flexibility, endurance, ROM of core, proximal hip and LE for functional self-care, mobility, lifting and ambulation/stair navigation   [x] (32102) Reviewed/Progressed HEP activities related to improving balance, coordination, kinesthetic sense, posture, motor skill, proprioception of core, proximal hip and LE for self-care, mobility, lifting, and ambulation/stair navigation      Manual Treatments:  PROM / STM / Oscillations-Mobs:  G-I, II, III, IV (PA's, Inf., Post.)  [x] (15339) Provided manual therapy to mobilize LE, proximal hip and/or LS spine soft tissue/joints for the purpose of modulating pain, promoting relaxation, increasing ROM, reducing/eliminating soft tissue swelling/inflammation/restriction, improving soft tissue extensibility and allowing for proper ROM for normal function with self-care, mobility, lifting and ambulation. Modalities: Vaso x 10' for edema and pain control with    Charges:  Timed Code Treatment Minutes: 60   Total Treatment Minutes:  70'   BWC:  TE TIME:  NMR TIME:  MANUAL TIME:  UNTIMED MINUTES:  Medicare Total:    -  -  -  -  2022 - 1 visit $130    2021 - Visits (23)  Total: $2210      [] EVAL (LOW) 33625 (typically 20 minutes face-to-face)  [] EVAL (MOD) 45737 (typically 30 minutes face-to-face)  [] EVAL (HIGH) 99398 (typically 45 minutes face-to-face)  [] RE-EVAL     [x] FO(33752) x 2   [] IONTO  [x] NMR (53065) x   1  [x] VASO 1  [x] Manual (72484) x 1    [] Other:  [] TA x      [] Mech Traction (34736)  [] ES(attended) (02420)      [] ES (un) (94136):    ASSESSMENT:  Patient continues to struggle with terminal knee extension. He demonstrates some ER at the hip, which makes TKE more difficult. GOALS:   Short Term Goals: To be achieved in: 2 weeks  1. Independent in HEP and progression per patient tolerance, in order to prevent re-injury. [] Progressing: [x] Met: [] Not Met: [] Adjusted   2. Patient will have a decrease in pain to facilitate improvement in movement, function, and ADLs as indicated by Functional Deficits. [] Progressing: [x] Met: [] Not Met: [] Adjusted     Long Term Goals: To be achieved in: 12 weeks  1. Disability index score of 20% or less for the LEFS to assist with reaching prior level of function. [x] Progressing: [] Met: [] Not Met: [] Adjusted   2.  Patient will demonstrate increased AROM to equal the opposite side bilaterally to allow for proper joint functioning as indicated by patients Functional Deficits. [x] Progressing: [] Met: [] Not Met: [] Adjusted   3. Patient will demonstrate an increase in strength of Left Hip and Knee = Right Hip and Knee to allow for proper functional mobility as indicated by patients Functional Deficits. [x] Progressing: [] Met: [] Not Met: [] Adjusted   4. Patient will return to all transfers, work activities, and functional activities without increased symptoms or restriction. [x] Progressing: [] Met: [] Not Met: [] Adjusted   5. Patient will have </=1-2/10 pain with ADL's. [x] Progressing: [] Met: [] Not Met: [] Adjusted   6. Patient stated goal: To return to normal walking and functional activity   [x] Progressing: [] Met: [] Not Met: [] Adjusted    Overall Progression Towards Functional goals/ Treatment Progress Update:  [x] Patient is progressing as expected towards functional goals listed. [] Progression is slowed due to complexities/Impairments listed. [] Progression has been slowed due to co-morbidities.   [] Plan just implemented, too soon to assess goals progression <30days   [] Goals require adjustment due to lack of progress  [] Patient is not progressing as expected and requires additional follow up with physician  [] Other    Prognosis for POC: [x] Good [] Fair  [] Poor    Patient requires continued skilled intervention: [x] Yes  [] No    Treatment/Activity Tolerance:  [x] Patient able to complete treatment  [] Patient limited by fatigue  [] Patient limited by pain    [] Patient limited by other medical complications  [] Other:     Return to Play: (if applicable)   []  Stage 1: Intro to Strength   []  Stage 2: Return to Run and Strength   []  Stage 3: Return to Jump and Strength   []  Stage 4: Dynamic Strength and Agility   []  Stage 5: Sport Specific Training     []  Ready to Return to Play, Meets All Above Stages   []  Not Ready for Return to Sports   Comments:                         PLAN:   [x] Continue per plan of care [] Alter current plan (see comments above)  [] Plan of care initiated [] Hold pending MD visit [] Discharge    Electronically signed by:  Carolyn Bui PTA; Haydee Meza PT,MPT,ATC,cert DN    Note: If patient does not return for scheduled/ recommended follow up visits, this note will serve as a discharge from care along with most recent update on progress.

## 2022-01-06 ENCOUNTER — HOSPITAL ENCOUNTER (OUTPATIENT)
Dept: PHYSICAL THERAPY | Age: 72
Setting detail: THERAPIES SERIES
Discharge: HOME | End: 2022-01-06
Payer: MEDICARE

## 2022-01-06 PROCEDURE — 97112 NEUROMUSCULAR REEDUCATION: CPT | Performed by: PHYSICAL THERAPY ASSISTANT

## 2022-01-06 PROCEDURE — 97110 THERAPEUTIC EXERCISES: CPT | Performed by: PHYSICAL THERAPY ASSISTANT

## 2022-01-06 PROCEDURE — 97140 MANUAL THERAPY 1/> REGIONS: CPT | Performed by: PHYSICAL THERAPY ASSISTANT

## 2022-01-06 NOTE — FLOWSHEET NOTE
[facility], [address] [place] [address] [place], [address]  Phone: [phone], [phone]      Physical Therapy Treatment Note/ Progress Report:     Date:  2022    Patient Name:  Abundio Epperson    :  1950  MRN: [id number]  Restrictions/Precautions:    Medical/Treatment Diagnosis Information:  · Diagnosis: Left Knee OA s/p Left TKA 10/6/2021  · Treatment Diagnosis: K70.85  Insurance/Certification information:  PT Insurance Information: Medicare  Physician Information:  Referring Practitioner: Ravindra Larsen  Has the plan of care been signed (Y/N):        []  Yes  [x]  No     Date of Patient follow up with Physician: Dec 2, 2021    Is this a Progress Report:     []  Yes  [x]  No      If Yes:  Date Range for reporting period:      Beginnin/3/2022 --- Endin/3/2022    Progress report will be due (10 Rx or 30 days whichever is less): 9451     Recertification will be due (POC Duration  / 90 days whichever is less): 2/3/2022      Visit # Insurance Allowable Auth Required   In Person 2022 Med Nec []  Yes     []  No    Tele Health -  []  Yes     []  No    Total 24         Functional Scale:  LEFS:  26% (Score:  59/80)   Date assessed:  2021  Functional Scale:  LEFS:  19% (Score:  65/80)   Date:  1/3/2022       Latex Allergy:  [x]NO      []YES  Preferred Language for Healthcare:   [x]English       []other:    Pain level:  3-4/10 when up and moving      SUBJECTIVE: Has been a little more sore since last visit. OBJECTIVE: See eval   Observation:    Test measurements:    2 from zero to 120 flexion 2021   2 from zero to 119 flexion 2021  PROM 2 °  from zero to 120 °  flexion 2021; AROM 2 °  from zero to 125 °   *  12/3/2021 2 from zero to 116 in supine passive and sitting with patient belt   * 2021 0-116  2021 - 0-123    RESTRICTIONS/PRECAUTIONS: Hx HTN, Hyperlipidemia;  Hx stent placment    Exercises/Interventions: Therapeutic Ex (57694)  Therapeutic Activity (35103)  NMR re-education (73235) Sets/Reps Notes/CUES   Bike 5' L3         Slant Board  HR 3 x 30\"  x30    SLB 10 x 10\" Airex   Standing march X30 airex  Single arm        Step and hold          Mini squat x20 airex    Leg Press 100# x30 DL  80# x20 SL  80# 10 x 10\" Hover    Lateral Step Up 20 x ea 6\"   FSU  2 x 10 6\"    [address] TKE  JUAN standing abd 75# x20  60# x30 R, L    HS curls  Knee extension 40# x30 R, L  10# x20         Quad Sets 15 x 5\"         SAQ 3 x 10 3#   SLR 3 x 10 2#   SL SLR  3 x 102#   LAQ   3#   Bridge 3\" x20    HL clams x20 ea 2 way Green        Heel Slides with belt & slide board 3\" x20    Heel Prop 2 x 2'         Long Sit Gastroc Stretch 3  x  30\"    Long Sit Hamstring Stretch 3  x  30\"     Seated knee flex stretch 3  x 30\"                                                                Manual Intervention ([phone])     A/P tibiofemoral joint mobs 3'    Long axis distraction         PROM Knee Flex/Ext 8'                    Medbridge access code:   Access Code: [email or web address]  URL: [email or web address]  Date: 10/11/2021  Prepared by: Amisha Do    Exercises  Seated Table Hamstring Stretch - 1 x daily - 7 x weekly - 5 reps - 1 sets - 30\" hold  Long Sitting Calf Stretch with Strap - 1 x daily - 7 x weekly - 5 reps - 1 sets - 30\" hold  Supine Quad Set - 1-2 x daily - 7 x weekly - 1-2 sets - 10 reps - 5\" hold  Supine Short Arc Quad - 1 x daily - 7 x weekly - 10 reps - 3 sets  Active Straight Leg Raise with Quad Set - 1 x daily - 7 x weekly - 3 sets - 10 reps  Seated Knee Flexion AAROM - 1 x daily - 7 x weekly - 1 sets - 10 reps - 10\" hold               Patient Education 5' Pt education with HEP and progression of PT along with compliance with HEP to aide with formal PT for optimal outcomes.  Education with proper gait training to improve heel strike and TKE        Therapeutic Exercise and NMR EXR  [x] (71745) Provided verbal/tactile cueing for activities related to strengthening, flexibility, endurance, ROM for improvements in LE, proximal hip, and core control with self care, mobility, lifting, ambulation. [x] (20134) Provided verbal/tactile cueing for activities related to improving balance, coordination, kinesthetic sense, posture, motor skill, proprioception to assist with LE, proximal hip, and core control in self-care, mobility, lifting, ambulation and eccentric single leg control. NMR and Therapeutic Activities:    [x] (84241 or 76561) Provided verbal/tactile cueing for activities related to improving balance, coordination, kinesthetic sense, posture, motor skill, proprioception and motor activation to allow for proper function of core, proximal hip and LE with self-care and ADLs and functional mobility.    [x] (84921) Gait Re-education- Provided training and instruction to the patient for proper LE, core and proximal hip recruitment and positioning and eccentric body weight control with ambulation re-education including up and down stairs     Home Exercise Program:    [x] (52426) Reviewed/Progressed HEP activities related to strengthening, flexibility, endurance, ROM of core, proximal hip and LE for functional self-care, mobility, lifting and ambulation/stair navigation   [x] (65190) Reviewed/Progressed HEP activities related to improving balance, coordination, kinesthetic sense, posture, motor skill, proprioception of core, proximal hip and LE for self-care, mobility, lifting, and ambulation/stair navigation      Manual Treatments:  PROM / STM / Oscillations-Mobs:  G-I, II, III, IV (PA's, Inf., Post.)  [x] (00857) Provided manual therapy to mobilize LE, proximal hip and/or LS spine soft tissue/joints for the purpose of modulating pain, promoting relaxation, increasing ROM, reducing/eliminating soft tissue swelling/inflammation/restriction, improving soft tissue extensibility and allowing for proper ROM for normal function with self-care, mobility, lifting and ambulation. Modalities: Vaso x 10' for edema and pain control with    Charges:  Timed Code Treatment Minutes: 60   Total Treatment Minutes:  70'   BWC:  TE TIME:  NMR TIME:  MANUAL TIME:  UNTIMED MINUTES:  Medicare Total:    -  -  -  -  2022 - 2 visit $260    2021 - Visits (23)  Total: $2210      [] EVAL (LOW) 91825 (typically 20 minutes face-to-face)  [] EVAL (MOD) 89725 (typically 30 minutes face-to-face)  [] EVAL (HIGH) 77823 (typically 45 minutes face-to-face)  [] RE-EVAL     [x] CX(48534) x 2   [] IONTO  [x] NMR (11939) x   1  [x] VASO 1  [x] Manual (82943) x 1    [] Other:  [] TA x      [] Mech Traction (93939)  [] ES(attended) (78272)      [] ES (un) (07117):    ASSESSMENT:  Patient continues to struggle with terminal knee extension. He demonstrates some ER at the hip, which makes TKE more difficult. GOALS:   Short Term Goals: To be achieved in: 2 weeks  1. Independent in HEP and progression per patient tolerance, in order to prevent re-injury. [] Progressing: [x] Met: [] Not Met: [] Adjusted   2. Patient will have a decrease in pain to facilitate improvement in movement, function, and ADLs as indicated by Functional Deficits. [] Progressing: [x] Met: [] Not Met: [] Adjusted     Long Term Goals: To be achieved in: 12 weeks  1. Disability index score of 20% or less for the LEFS to assist with reaching prior level of function. [x] Progressing: [] Met: [] Not Met: [] Adjusted   2. Patient will demonstrate increased AROM to equal the opposite side bilaterally to allow for proper joint functioning as indicated by patients Functional Deficits. [x] Progressing: [] Met: [] Not Met: [] Adjusted   3. Patient will demonstrate an increase in strength of Left Hip and Knee = Right Hip and Knee to allow for proper functional mobility as indicated by patients Functional Deficits. [x] Progressing: [] Met: [] Not Met: [] Adjusted   4.  Patient will return to all transfers, work activities, and functional activities without increased symptoms or restriction. [x] Progressing: [] Met: [] Not Met: [] Adjusted   5. Patient will have </=1-2/10 pain with ADL's. [x] Progressing: [] Met: [] Not Met: [] Adjusted   6. Patient stated goal: To return to normal walking and functional activity   [x] Progressing: [] Met: [] Not Met: [] Adjusted    Overall Progression Towards Functional goals/ Treatment Progress Update:  [x] Patient is progressing as expected towards functional goals listed. [] Progression is slowed due to complexities/Impairments listed. [] Progression has been slowed due to co-morbidities. [] Plan just implemented, too soon to assess goals progression <30days   [] Goals require adjustment due to lack of progress  [] Patient is not progressing as expected and requires additional follow up with physician  [] Other    Prognosis for POC: [x] Good [] Fair  [] Poor    Patient requires continued skilled intervention: [x] Yes  [] No    Treatment/Activity Tolerance:  [x] Patient able to complete treatment  [] Patient limited by fatigue  [] Patient limited by pain    [] Patient limited by other medical complications  [] Other:     Return to Play: (if applicable)   []  Stage 1: Intro to Strength   []  Stage 2: Return to Run and Strength   []  Stage 3: Return to Jump and Strength   []  Stage 4: Dynamic Strength and Agility   []  Stage 5: Sport Specific Training     []  Ready to Return to Play, Meets All Above Stages   []  Not Ready for Return to Sports   Comments:                         PLAN:   [x] Continue per plan of care [] Alter current plan (see comments above)  [] Plan of care initiated [] Hold pending MD visit [] Discharge    Electronically signed by:  Kristofer Arriola PTA; Grey Nguyen PT,MPT,ATC,cert DN    Note: If patient does not return for scheduled/ recommended follow up visits, this note will serve as a discharge from care along with most recent update on progress.

## 2022-01-10 ENCOUNTER — HOSPITAL ENCOUNTER (OUTPATIENT)
Dept: PHYSICAL THERAPY | Age: 72
Setting detail: THERAPIES SERIES
Discharge: HOME | End: 2022-01-10
Payer: MEDICARE

## 2022-01-10 PROCEDURE — 97112 NEUROMUSCULAR REEDUCATION: CPT | Performed by: PHYSICAL THERAPY ASSISTANT

## 2022-01-10 PROCEDURE — 97140 MANUAL THERAPY 1/> REGIONS: CPT | Performed by: PHYSICAL THERAPY ASSISTANT

## 2022-01-10 PROCEDURE — 97110 THERAPEUTIC EXERCISES: CPT | Performed by: PHYSICAL THERAPY ASSISTANT

## 2022-01-10 NOTE — FLOWSHEET NOTE
[facility], [address] [place] [address] Kenia Davila, [address]  Phone: [phone], [phone]      Physical Therapy Treatment Note/ Progress Report:     Date:  1/10/2022    Patient Name:  Srinath Riggs    :  1950  MRN: [id number]  Restrictions/Precautions:    Medical/Treatment Diagnosis Information:  · Diagnosis: Left Knee OA s/p Left TKA 10/6/2021  · Treatment Diagnosis: S64.44  Insurance/Certification information:  PT Insurance Information: Medicare  Physician Information:  Referring Practitioner: Leo Gregory  Has the plan of care been signed (Y/N):        []  Yes  [x]  No     Date of Patient follow up with Physician: Dec 2, 2021    Is this a Progress Report:     []  Yes  [x]  No      If Yes:  Date Range for reporting period:      Beginnin/3/2022 --- Endin/3/2022    Progress report will be due (10 Rx or 30 days whichever is less): 1317     Recertification will be due (POC Duration  / 90 days whichever is less): 2/3/2022      Visit # Insurance Allowable Auth Required   In Person 3 - 2022  23 - 2021 Med Nec []  Yes     []  No    Tele Health -  []  Yes     []  No    Total 24         Functional Scale:  LEFS:  26% (Score:  59/80)   Date assessed:  2021  Functional Scale:  LEFS:  19% (Score:  65/80)   Date:  1/3/2022       Latex Allergy:  [x]NO      []YES  Preferred Language for Healthcare:   [x]English       []other:    Pain level:  3-4/10 when up and moving      SUBJECTIVE: Doing pretty well. Still intermittent soreness. Overall doing better. Feels 80-85% of normal at this point.       OBJECTIVE: See eval   Observation:    Test measurements:    2 from zero to 120 flexion 2021   2 from zero to 119 flexion 2021  PROM 2 °  from zero to 120 °  flexion 2021; AROM 2 °  from zero to 125 °   *  12/3/2021 2 from zero to 116 in supine passive and sitting with patient belt   * 2021 0-116  2021 - 0-123    RESTRICTIONS/PRECAUTIONS: Hx HTN, Hyperlipidemia; Hx stent placment    Exercises/Interventions:   Therapeutic Ex (84583)  Therapeutic Activity (81703)  NMR re-education (75903) Sets/Reps Notes/CUES   Bike 5' L3         Slant Board  HR 3 x 30\"  x30    SLB 10 x 10\" Airex   Standing march X30 airex  Single arm        Step and hold          Mini squat x20 airex    Leg Press 100# x30 DL  80# x20 SL  80# 10 x 10\" Hover  80# x20 Ecc lower    Lateral Step Up 20 x ea 6\"   FSU  2 x 10 6\"    [address] TKE  JUAN standing abd 75# x20  60# x30 R, L    HS curls  Knee extension 40# x30 R, L  20# x20         Quad Sets 15 x 5\"         SAQ 3 x 10 3#   SLR 3 x 10 2#   SL SLR  3 x 102#   LAQ   3#   Bridge 3\" x20    HL clams x20 ea 2 way Green        Heel Slides with belt & slide board 3\" x20    Heel Prop 2 x 2'         Long Sit Gastroc Stretch 3  x  30\"    Long Sit Hamstring Stretch 3  x  30\"     Seated knee flex stretch 3  x 30\"                                                                Manual Intervention ([id number])     A/P tibiofemoral joint mobs 3'    Long axis distraction         PROM Knee Flex/Ext 8'                    [address] access code:  Updated 1/10/2022 Access Code: [email or web address]  URL: [email or web address]  Date: 10/11/2021  Prepared by: Faina Gee    Exercises  Seated Table Hamstring Stretch - 1 x daily - 7 x weekly - 5 reps - 1 sets - 30\" hold  Long Sitting Calf Stretch with Strap - 1 x daily - 7 x weekly - 5 reps - 1 sets - 30\" hold  Supine Quad Set - 1-2 x daily - 7 x weekly - 1-2 sets - 10 reps - 5\" hold  Supine Short Arc Quad - 1 x daily - 7 x weekly - 10 reps - 3 sets  Active Straight Leg Raise with Quad Set - 1 x daily - 7 x weekly - 3 sets - 10 reps  Seated Knee Flexion AAROM - 1 x daily - 7 x weekly - 1 sets - 10 reps - 10\" hold               Patient Education 5' Pt education with HEP and progression of PT along with compliance with HEP to aide with formal PT for optimal outcomes.  Education with proper gait training to improve heel strike and TKE Therapeutic Exercise and NMR EXR  [x] (55632) Provided verbal/tactile cueing for activities related to strengthening, flexibility, endurance, ROM for improvements in LE, proximal hip, and core control with self care, mobility, lifting, ambulation. [x] (38770) Provided verbal/tactile cueing for activities related to improving balance, coordination, kinesthetic sense, posture, motor skill, proprioception to assist with LE, proximal hip, and core control in self-care, mobility, lifting, ambulation and eccentric single leg control. NMR and Therapeutic Activities:    [x] (03134 or 37928) Provided verbal/tactile cueing for activities related to improving balance, coordination, kinesthetic sense, posture, motor skill, proprioception and motor activation to allow for proper function of core, proximal hip and LE with self-care and ADLs and functional mobility.    [x] (15740) Gait Re-education- Provided training and instruction to the patient for proper LE, core and proximal hip recruitment and positioning and eccentric body weight control with ambulation re-education including up and down stairs     Home Exercise Program:    [x] (14889) Reviewed/Progressed HEP activities related to strengthening, flexibility, endurance, ROM of core, proximal hip and LE for functional self-care, mobility, lifting and ambulation/stair navigation   [x] (73812) Reviewed/Progressed HEP activities related to improving balance, coordination, kinesthetic sense, posture, motor skill, proprioception of core, proximal hip and LE for self-care, mobility, lifting, and ambulation/stair navigation      Manual Treatments:  PROM / STM / Oscillations-Mobs:  G-I, II, III, IV (PA's, Inf., Post.)  [x] (20445) Provided manual therapy to mobilize LE, proximal hip and/or LS spine soft tissue/joints for the purpose of modulating pain, promoting relaxation, increasing ROM, reducing/eliminating soft tissue swelling/inflammation/restriction, improving soft tissue extensibility and allowing for proper ROM for normal function with self-care, mobility, lifting and ambulation. Modalities: Vaso x 10' for edema and pain control with    Charges:  Timed Code Treatment Minutes: 60   Total Treatment Minutes:  70'   BWC:  TE TIME:  NMR TIME:  MANUAL TIME:  UNTIMED MINUTES:  Medicare Total:    -  -  -  -  2022 - 3 visit $390    2021 - Visits (23)  Total: $2210      [] EVAL (LOW) 15765 (typically 20 minutes face-to-face)  [] EVAL (MOD) 01335 (typically 30 minutes face-to-face)  [] EVAL (HIGH) 28969 (typically 45 minutes face-to-face)  [] RE-EVAL     [x] EE(34441) x 2   [] IONTO  [x] NMR (57586) x   1  [x] VASO 1  [x] Manual (10151) x 1    [] Other:  [] TA x      [] Mech Traction (58699)  [] ES(attended) (39182)      [] ES (un) (30013):    ASSESSMENT:  Patient continues to struggle with terminal knee extension. He demonstrates some ER at the hip, which makes TKE more difficult. GOALS:   Short Term Goals: To be achieved in: 2 weeks  1. Independent in HEP and progression per patient tolerance, in order to prevent re-injury. [] Progressing: [x] Met: [] Not Met: [] Adjusted   2. Patient will have a decrease in pain to facilitate improvement in movement, function, and ADLs as indicated by Functional Deficits. [] Progressing: [x] Met: [] Not Met: [] Adjusted     Long Term Goals: To be achieved in: 12 weeks  1. Disability index score of 20% or less for the LEFS to assist with reaching prior level of function. [x] Progressing: [] Met: [] Not Met: [] Adjusted   2. Patient will demonstrate increased AROM to equal the opposite side bilaterally to allow for proper joint functioning as indicated by patients Functional Deficits. [x] Progressing: [] Met: [] Not Met: [] Adjusted   3. Patient will demonstrate an increase in strength of Left Hip and Knee = Right Hip and Knee to allow for proper functional mobility as indicated by patients Functional Deficits.    [x] Progressing: [] Met: discharge from care along with most recent update on progress.

## 2022-01-12 ENCOUNTER — HOSPITAL ENCOUNTER (OUTPATIENT)
Dept: PHYSICAL THERAPY | Age: 72
Setting detail: THERAPIES SERIES
Discharge: HOME | End: 2022-01-12
Payer: MEDICARE

## 2022-01-12 PROCEDURE — 97112 NEUROMUSCULAR REEDUCATION: CPT | Performed by: PHYSICAL THERAPY ASSISTANT

## 2022-01-12 PROCEDURE — 97110 THERAPEUTIC EXERCISES: CPT | Performed by: PHYSICAL THERAPY ASSISTANT

## 2022-01-12 PROCEDURE — 97140 MANUAL THERAPY 1/> REGIONS: CPT | Performed by: PHYSICAL THERAPY ASSISTANT

## 2022-01-12 NOTE — FLOWSHEET NOTE
[facility], [address] [place] [address] Halina Davila [address], [address]  Phone: [phone], [phone]    Date: 2022          Patient Name; :  Ming Cochran; 1950   Dx/ICD Code: Diagnosis: Left Knee OA s/p Left TKA 10/6/2021  Treatment Diagnosis: M17.12       Physician: Jackie Suresh      Total PT Visits: 27     Measures Previous Current   Pain (0-10) 10/10 3-4/10   Disability % LEFS:  75% LEFS:  19%   ROM Lacking 11 degrees to 94 degrees flexion 0-123 degrees              Strength                 Specific Functional Improvements & Impressions:  Yessi Camacho is progressing well in therapy with ROM and strength. 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 He notes most of his discomfort at night while in bed. He gets intermittent discomfort throughout the day. He is aware of the importance of continuing with his HEP at least 4 days a week in order to maintain his functional ROM and progress his strength. Plan & Recommendations:  [] Continue rehabilitation due to objective improvement and continued functional deficits with frequency and duration:   [] Progress toward  []GAP, []Work Conditioning, []Independent HEP   [x] Discharge due to   [x] All goals achieved, [] Maximized \"medical necessity\" [] No subjective or objective improvements      Electronically signed by:  Milka Martines PTA, Moise Alberta PT,MPT,ATC, cert DN  Therapy Plan of Care Re-Certification  This patient has been re-evaluated for physical therapy services and for therapy to continue, Medicare, Medicaid and other insurances require periodic physician review of the treatment plan.  Please review the above re-evaluation and verify that you agree with plan of care as established above by signing the attached document and return it to our office or note changes to established plan below  [] Follow treatment plan as above [] Discontinue physical therapy  [] Change plan to: __________________________________________________    Physician Signature:____________________________________ Date:____________  By signing above, therapists plan is approved by physician    If you have any questions or concerns, please don't hesitate to call. Thank you for your referral.    Rafaela Hernandez [address] office  ([phone])     Fax [phone]         Physical Therapy Treatment Note/ Progress Report:     Date:  2022    Patient Name:  Ovi Hussein    :  1950  MRN: [id number]  Restrictions/Precautions:    Medical/Treatment Diagnosis Information:  · Diagnosis: Left Knee OA s/p Left TKA 10/6/2021  · Treatment Diagnosis: A60.00  Insurance/Certification information:  PT Insurance Information: Medicare  Physician Information:  Referring Practitioner: Marija Gunderson  Has the plan of care been signed (Y/N):        []  Yes  [x]  No     Date of Patient follow up with Physician: Dec 2, 2021    Is this a Progress Report:     []  Yes  [x]  No      If Yes:  Date Range for reporting period:        Beginnin2022 --- Endin2022    Progress report will be due (10 Rx or 30 days whichever is less): 9989     Recertification will be due (POC Duration  / 90 days whichever is less): 2022      Visit # [id number] Matilde Villegas Required   In Person 2022 Med Nec []  Yes     []  No    Tele Health -  []  Yes     []  No    Total 27         Functional Scale:  LEFS:  19% (Score:  65/80)   Date:  1/3/2022       Latex Allergy:  [x]NO      []YES  Preferred Language for Healthcare:   [x]English       []other:    Pain level:  3-4/10 when up and moving      SUBJECTIVE: 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      OBJECTIVE: See eval   Observation:    Test measurements:    2 from zero to 120 flexion 2021   2 from zero to 119 flexion 2021  PROM 2 °  from zero to 120 °  flexion 2021; AROM 2 °  from zero to 125 °   *  12/3/2021 2 from zero to 116 in supine passive and sitting with patient belt   * 12/6/2021 0-116  12/29/2021 - 0-123    RESTRICTIONS/PRECAUTIONS: Hx HTN, Hyperlipidemia; Hx stent placment    Exercises/Interventions:   Therapeutic Ex (85351)  Therapeutic Activity (30780)  NMR re-education (93017) Sets/Reps Notes/CUES   Bike 5' L3         Slant Board  HR 3 x 30\"  x30    SLB 10 x 10\" Airex   Standing march X30 airex  Single arm        Step and hold          Mini squat x20 airex    Leg Press 100# x30 DL  60# x20 SL  80# 10 x 10\" Hover  80# x20 Ecc lower    Lateral Step Up 20 x ea 6\"   FSU  2 x 10 6\"    [facility] TKE  JUAN standing abd 75# x20  60# x30 R, L    HS curls  Knee extension 40# x30 R, L  20# x20         Quad Sets 15 x 5\"         SAQ 3 x 10 3#   SLR 3 x 10 2#   SL SLR  3 x 102#   LAQ   3#   Bridge 3\" x20    SL clams x20 ea 2 way Green        Heel Slides with belt & slide board 3\" x20    Heel Prop 2 x 2'         Long Sit Gastroc Stretch 3  x  30\"    Long Sit Hamstring Stretch 3  x  30\"     Seated knee flex stretch 3  x 30\"                                                                Manual Intervention ([id number])     A/P tibiofemoral joint mobs 3'    Long axis distraction         PROM Knee Flex/Ext 8'                    [address] access code:  Updated 1/10/2022 Access Code: [email or web address]  URL: [email or web address]  Date: 10/11/2021  Prepared by: Cody Manrique    Exercises  Seated Table Hamstring Stretch - 1 x daily - 7 x weekly - 5 reps - 1 sets - 30\" hold  Long Sitting Calf Stretch with Strap - 1 x daily - 7 x weekly - 5 reps - 1 sets - 30\" hold  Supine Quad Set - 1-2 x daily - 7 x weekly - 1-2 sets - 10 reps - 5\" hold  Supine Short Arc Quad - 1 x daily - 7 x weekly - 10 reps - 3 sets  Active Straight Leg Raise with Quad Set - 1 x daily - 7 x weekly - 3 sets - 10 reps  Seated Knee Flexion AAROM - 1 x daily - 7 x weekly - 1 sets - 10 reps - 10\" hold               Patient Education 5' Pt education with HEP and progression of PT along with compliance with HEP to aide with formal PT for optimal outcomes. Education with proper gait training to improve heel strike and TKE        Therapeutic Exercise and NMR EXR  [x] (77682) Provided verbal/tactile cueing for activities related to strengthening, flexibility, endurance, ROM for improvements in LE, proximal hip, and core control with self care, mobility, lifting, ambulation. [x] (86040) Provided verbal/tactile cueing for activities related to improving balance, coordination, kinesthetic sense, posture, motor skill, proprioception to assist with LE, proximal hip, and core control in self-care, mobility, lifting, ambulation and eccentric single leg control. NMR and Therapeutic Activities:    [x] (26554 or 74492) Provided verbal/tactile cueing for activities related to improving balance, coordination, kinesthetic sense, posture, motor skill, proprioception and motor activation to allow for proper function of core, proximal hip and LE with self-care and ADLs and functional mobility.    [x] (43842) Gait Re-education- Provided training and instruction to the patient for proper LE, core and proximal hip recruitment and positioning and eccentric body weight control with ambulation re-education including up and down stairs     Home Exercise Program:    [x] (67870) Reviewed/Progressed HEP activities related to strengthening, flexibility, endurance, ROM of core, proximal hip and LE for functional self-care, mobility, lifting and ambulation/stair navigation   [x] (39713) Reviewed/Progressed HEP activities related to improving balance, coordination, kinesthetic sense, posture, motor skill, proprioception of core, proximal hip and LE for self-care, mobility, lifting, and ambulation/stair navigation      Manual Treatments:  PROM / STM / Oscillations-Mobs:  G-I, II, III, IV (PA's, Inf., Post.)  [x] (81671) Provided manual therapy to mobilize LE, proximal hip and/or LS spine soft tissue/joints for the purpose of modulating pain, promoting relaxation, increasing ROM, reducing/eliminating soft tissue swelling/inflammation/restriction, improving soft tissue extensibility and allowing for proper ROM for normal function with self-care, mobility, lifting and ambulation. Modalities: Vaso x 10' for edema and pain control with    Charges:  Timed Code Treatment Minutes: 60   Total Treatment Minutes:  70'   BWC:  TE TIME:  NMR TIME:  MANUAL TIME:  UNTIMED MINUTES:  Medicare Total:    -  -  -  -  2022 - 4 visit $520    2021 - Visits (23)  Total: $2210      [] EVAL (LOW) 70041 (typically 20 minutes face-to-face)  [] EVAL (MOD) 89751 (typically 30 minutes face-to-face)  [] EVAL (HIGH) 30110 (typically 45 minutes face-to-face)  [] RE-EVAL     [x] UG(21671) x 2   [] IONTO  [x] NMR (65947) x   1  [x] VASO 1  [x] Manual (67993) x 1    [] Other:  [] TA x      [] Mech Traction (95326)  [] ES(attended) (98811)      [] ES (un) (26797):    ASSESSMENT:  Patient continues to struggle with terminal knee extension. He demonstrates some ER at the hip, which makes TKE more difficult. GOALS:   Short Term Goals: To be achieved in: 2 weeks  1. Independent in HEP and progression per patient tolerance, in order to prevent re-injury. [] Progressing: [x] Met: [] Not Met: [] Adjusted   2. Patient will have a decrease in pain to facilitate improvement in movement, function, and ADLs as indicated by Functional Deficits. [] Progressing: [x] Met: [] Not Met: [] Adjusted     Long Term Goals: To be achieved in: 12 weeks  1. Disability index score of 20% or less for the LEFS to assist with reaching prior level of function. [] Progressing: [x] Met: [] Not Met: [] Adjusted   2. Patient will demonstrate increased AROM to equal the opposite side bilaterally to allow for proper joint functioning as indicated by patients Functional Deficits. [] Progressing: [x] Met: [] Not Met: [] Adjusted   3.  Patient will demonstrate an increase in strength of Left Hip and Knee = Right Hip and Knee to allow for proper functional mobility as indicated by patients Functional Deficits. [] Progressing: [x] Met: [] Not Met: [] Adjusted   4. Patient will return to all transfers, work activities, and functional activities without increased symptoms or restriction. [] Progressing: [x] Met: [] Not Met: [] Adjusted   5. Patient will have </=1-2/10 pain with ADL's. [x] Progressing: [] Met: [] Not Met: [] Adjusted   6. Patient stated goal: To return to normal walking and functional activity   [x] Progressing: [] Met: [] Not Met: [] Adjusted    Overall Progression Towards Functional goals/ Treatment Progress Update:  [x] Patient is progressing as expected towards functional goals listed. [] Progression is slowed due to complexities/Impairments listed. [] Progression has been slowed due to co-morbidities.   [] Plan just implemented, too soon to assess goals progression <30days   [] Goals require adjustment due to lack of progress  [] Patient is not progressing as expected and requires additional follow up with physician  [] Other    Prognosis for POC: [x] Good [] Fair  [] Poor    Patient requires continued skilled intervention: [x] Yes  [] No    Treatment/Activity Tolerance:  [x] Patient able to complete treatment  [] Patient limited by fatigue  [] Patient limited by pain    [] Patient limited by other medical complications  [] Other:     Return to Play: (if applicable)   []  Stage 1: Intro to Strength   []  Stage 2: Return to Run and Strength   []  Stage 3: Return to Jump and Strength   []  Stage 4: Dynamic Strength and Agility   []  Stage 5: Sport Specific Training     []  Ready to Return to Play, Meets All Above Stages   []  Not Ready for Return to Sports   Comments:                         PLAN:   [x] Continue per plan of care [] Alter current plan (see comments above)  [] Plan of care initiated [] Hold pending MD visit [] Discharge    Electronically signed by:  Randall Carcamo, PTA; Liane Joseph PT,MPT,ATC,cert DN    Note: If patient does not return for scheduled/ recommended follow up visits, this note will serve as a discharge from care along with most recent update on progress.

## 2022-01-13 ENCOUNTER — APPOINTMENT (OUTPATIENT)
Dept: PHYSICAL THERAPY | Age: 72
End: 2022-01-13
Payer: MEDICARE

## 2022-01-18 ENCOUNTER — HOSPITAL ENCOUNTER (OUTPATIENT)
Dept: PHYSICAL THERAPY | Age: 72
Setting detail: THERAPIES SERIES
Discharge: HOME | End: 2022-01-18
Payer: MEDICARE

## 2022-01-18 PROCEDURE — 97140 MANUAL THERAPY 1/> REGIONS: CPT | Performed by: PHYSICAL THERAPY ASSISTANT

## 2022-01-18 PROCEDURE — 97112 NEUROMUSCULAR REEDUCATION: CPT | Performed by: PHYSICAL THERAPY ASSISTANT

## 2022-01-18 PROCEDURE — 97110 THERAPEUTIC EXERCISES: CPT | Performed by: PHYSICAL THERAPY ASSISTANT

## 2022-01-18 NOTE — FLOWSHEET NOTE
[facility], [address] [place] [address] [place], [address]  Phone: [phone], [phone]      Physical Therapy Treatment Note/ Progress Report:     Date:  2022    Patient Name:  Abundio Epperson    :  1950  MRN: [id number]  Restrictions/Precautions:    Medical/Treatment Diagnosis Information:  · Diagnosis: Left Knee OA s/p Left TKA 10/6/2021  · Treatment Diagnosis: J67.12  Insurance/Certification information:  PT Insurance Information: Medicare  Physician Information:  Referring Practitioner: John Alexis  Has the plan of care been signed (Y/N):        []  Yes  [x]  No     Date of Patient follow up with Physician: Dec 2, 2021    Is this a Progress Report:     []  Yes  [x]  No      If Yes:  Date Range for reporting period:      Beginnin/3/2022 --- Endin/3/2022    Progress report will be due (10 Rx or 30 days whichever is less):      Recertification will be due (POC Duration  / 90 days whichever is less): 2/3/2022      Visit # Insurance Allowable Auth Required   In Person 2022 Med Nec []  Yes     []  No    Tele Health -  []  Yes     []  No    Total 27         Functional Scale:  LEFS:  26% (Score:  59/80)   Date assessed:  2021  Functional Scale:  LEFS:  19% (Score:  65/80)   Date:  1/3/2022       Latex Allergy:  [x]NO      []YES  Preferred Language for Healthcare:   [x]English       []other:    Pain level:  3-4/10 when up and moving      SUBJECTIVE: Patient saw MD and he felt he should continue with PT for a while at 1x week. OBJECTIVE: See eval   Observation:    Test measurements:    2 from zero to 120 flexion 2021   2 from zero to 119 flexion 2021  PROM 2 °  from zero to 120 °  flexion 2021; AROM 2 °  from zero to 125 °   *  12/3/2021 2 from zero to 116 in supine passive and sitting with patient belt   * 2021 0-116  2021 - 0-123    RESTRICTIONS/PRECAUTIONS: Hx HTN, Hyperlipidemia;  Hx stent placment    Exercises/Interventions:   Therapeutic Ex (52397)  Therapeutic Activity (35337)  NMR re-education (83609) Sets/Reps Notes/CUES   Bike 5' L3         Slant Board  HR 3 x 30\"  x30    SLB 10 x 10\" Airex   Standing march x30 airex          Step and hold          Mini squat x20 airex    Leg Press 100# x30 DL  80# x20 SL  80# 10 x 10\" Hover  80# x20 Ecc lower    Lateral Step Up 20 x ea 6\"   FSU  2 x 10 6\"    [facility] TKE  JUAN standing abd 75# x20  60# x30 R, L    HS curls  Knee extension 40# x30 R, L  20# x30         SAQ 3 x 10 3#   SLR 3 x 10 2#   SL SLR  3 x 102#   LAQ   3#   Bridge 3\" x20    SL clams x30 ea 2 way Green        Heel Slides with belt & slide board 3\" x20    Heel Prop          Long Sit Gastroc Stretch 3  x  30\"    Long Sit Hamstring Stretch 3  x  30\"     Seated knee flex stretch 3  x 30\"                                                                Manual Intervention (75117)     A/P tibiofemoral joint mobs 3'    Long axis distraction         PROM Knee Flex/Ext 8'                    [address] access code:  Updated 1/10/2022 Access Code: [email or web address]  URL: [email or web address]  Date: 10/11/2021  Prepared by: Francisco Landa    Exercises  Seated Table Hamstring Stretch - 1 x daily - 7 x weekly - 5 reps - 1 sets - 30\" hold  Long Sitting Calf Stretch with Strap - 1 x daily - 7 x weekly - 5 reps - 1 sets - 30\" hold  Supine Quad Set - 1-2 x daily - 7 x weekly - 1-2 sets - 10 reps - 5\" hold  Supine Short Arc Quad - 1 x daily - 7 x weekly - 10 reps - 3 sets  Active Straight Leg Raise with Quad Set - 1 x daily - 7 x weekly - 3 sets - 10 reps  Seated Knee Flexion AAROM - 1 x daily - 7 x weekly - 1 sets - 10 reps - 10\" hold               Patient Education 5' Pt education with HEP and progression of PT along with compliance with HEP to aide with formal PT for optimal outcomes.  Education with proper gait training to improve heel strike and TKE        Therapeutic Exercise and NMR EXR  [x] (35890) Provided verbal/tactile cueing for activities related to strengthening, flexibility, endurance, ROM for improvements in LE, proximal hip, and core control with self care, mobility, lifting, ambulation. [x] (32194) Provided verbal/tactile cueing for activities related to improving balance, coordination, kinesthetic sense, posture, motor skill, proprioception to assist with LE, proximal hip, and core control in self-care, mobility, lifting, ambulation and eccentric single leg control. NMR and Therapeutic Activities:    [x] (22918 or 47222) Provided verbal/tactile cueing for activities related to improving balance, coordination, kinesthetic sense, posture, motor skill, proprioception and motor activation to allow for proper function of core, proximal hip and LE with self-care and ADLs and functional mobility.    [x] (25958) Gait Re-education- Provided training and instruction to the patient for proper LE, core and proximal hip recruitment and positioning and eccentric body weight control with ambulation re-education including up and down stairs     Home Exercise Program:    [x] (45568) Reviewed/Progressed HEP activities related to strengthening, flexibility, endurance, ROM of core, proximal hip and LE for functional self-care, mobility, lifting and ambulation/stair navigation   [x] (61969) Reviewed/Progressed HEP activities related to improving balance, coordination, kinesthetic sense, posture, motor skill, proprioception of core, proximal hip and LE for self-care, mobility, lifting, and ambulation/stair navigation      Manual Treatments:  PROM / STM / Oscillations-Mobs:  G-I, II, III, IV (PA's, Inf., Post.)  [x] (87014) Provided manual therapy to mobilize LE, proximal hip and/or LS spine soft tissue/joints for the purpose of modulating pain, promoting relaxation, increasing ROM, reducing/eliminating soft tissue swelling/inflammation/restriction, improving soft tissue extensibility and allowing for proper ROM for normal function with self-care, mobility, lifting and ambulation. Modalities: Vaso x 10' for edema and pain control with    Charges:  Timed Code Treatment Minutes: 60   Total Treatment Minutes:  70'   BWC:  TE TIME:  NMR TIME:  MANUAL TIME:  UNTIMED MINUTES:  Medicare Total:    -  -  -  -  2022 - 5 visit $650    2021 - Visits (23)  Total: $2210      [] EVAL (LOW) 89027 (typically 20 minutes face-to-face)  [] EVAL (MOD) 20048 (typically 30 minutes face-to-face)  [] EVAL (HIGH) 93530 (typically 45 minutes face-to-face)  [] RE-EVAL     [x] AT(98062) x 2   [] IONTO  [x] NMR (53104) x   1  [] VASO   [x] Manual (79922) x 1    [] Other:  [] TA x      [] Mech Traction (67642)  [] ES(attended) (29342)      [] ES (un) (27310):    ASSESSMENT:  Patient continues to struggle with terminal knee extension. He demonstrates some ER at the hip, which makes TKE more difficult. GOALS:   Short Term Goals: To be achieved in: 2 weeks  1. Independent in HEP and progression per patient tolerance, in order to prevent re-injury. [] Progressing: [x] Met: [] Not Met: [] Adjusted   2. Patient will have a decrease in pain to facilitate improvement in movement, function, and ADLs as indicated by Functional Deficits. [] Progressing: [x] Met: [] Not Met: [] Adjusted     Long Term Goals: To be achieved in: 12 weeks  1. Disability index score of 20% or less for the LEFS to assist with reaching prior level of function. [] Progressing: [x] Met: [] Not Met: [] Adjusted   2. Patient will demonstrate increased AROM to equal the opposite side bilaterally to allow for proper joint functioning as indicated by patients Functional Deficits. [] Progressing: [x] Met: [] Not Met: [] Adjusted   3. Patient will demonstrate an increase in strength of Left Hip and Knee = Right Hip and Knee to allow for proper functional mobility as indicated by patients Functional Deficits. [] Progressing: [x] Met: [] Not Met: [] Adjusted   4.  Patient will return to all serve as a discharge from care along with most recent update on progress.

## 2022-01-24 DIAGNOSIS — G89.29 CHRONIC MIDLINE LOW BACK PAIN WITH BILATERAL SCIATICA: ICD-10-CM

## 2022-01-24 DIAGNOSIS — M54.41 CHRONIC MIDLINE LOW BACK PAIN WITH BILATERAL SCIATICA: ICD-10-CM

## 2022-01-24 DIAGNOSIS — M54.42 CHRONIC MIDLINE LOW BACK PAIN WITH BILATERAL SCIATICA: ICD-10-CM

## 2022-01-24 NOTE — TELEPHONE ENCOUNTER
Date of last refill of this med was 9/21/21, # of pills given 90 and # of refills given 2. Their next appointment is 4/1/22, the last date patient was seen was 9/21/21. Does patient have medication agreement on file? Yes  Has drug screen been done in last 12 months if needed?  no

## 2022-01-25 RX ORDER — TRAMADOL HYDROCHLORIDE 50 MG/1
TABLET ORAL
Qty: 90 TABLET | Refills: 1 | Status: SHIPPED | OUTPATIENT
Start: 2022-01-25 | End: 2022-04-08

## 2022-01-26 ENCOUNTER — HOSPITAL ENCOUNTER (OUTPATIENT)
Dept: PHYSICAL THERAPY | Age: 72
Setting detail: THERAPIES SERIES
Discharge: HOME | End: 2022-01-26
Payer: MEDICARE

## 2022-01-26 PROCEDURE — 97110 THERAPEUTIC EXERCISES: CPT | Performed by: PHYSICAL THERAPY ASSISTANT

## 2022-01-26 PROCEDURE — 97112 NEUROMUSCULAR REEDUCATION: CPT | Performed by: PHYSICAL THERAPY ASSISTANT

## 2022-01-26 PROCEDURE — 97140 MANUAL THERAPY 1/> REGIONS: CPT | Performed by: PHYSICAL THERAPY ASSISTANT

## 2022-01-26 NOTE — FLOWSHEET NOTE
[facility], [address] [place] [address] [place], [address]  Phone: [phone], [phone]      Physical Therapy Treatment Note/ Progress Report:     Date:  2022    Patient Name:  Regan Andino    :  1950  MRN: [id number]  Restrictions/Precautions:    Medical/Treatment Diagnosis Information:  · Diagnosis: Left Knee OA s/p Left TKA 10/6/2021  · Treatment Diagnosis: J57.39  Insurance/Certification information:  PT Insurance Information: Medicare  Physician Information:  Referring Practitioner: Lalito Carrasquillo  Has the plan of care been signed (Y/N):        []  Yes  [x]  No     Date of Patient follow up with Physician: Dec 2, 2021    Is this a Progress Report:     []  Yes  [x]  No      If Yes:  Date Range for reporting period:      Beginnin/3/2022 --- Endin/3/2022    Progress report will be due (10 Rx or 30 days whichever is less):      Recertification will be due (POC Duration  / 90 days whichever is less): 2/3/2022      Visit # Insurance Allowable Auth Required   In Person 2022 Med Nec []  Yes     []  No    Tele Health -  []  Yes     []  No    Total 30         Functional Scale:  LEFS:  26% (Score:  59/80)   Date assessed:  2021  Functional Scale:  LEFS:  19% (Score:  65/80)   Date:  1/3/2022       Latex Allergy:  [x]NO      []YES  Preferred Language for Healthcare:   [x]English       []other:    Pain level:  3-4/10 when up and moving      SUBJECTIVE: Was sore yesterday but today he is feeling much better   OBJECTIVE: See eval   Observation:    Test measurements:    2 from zero to 120 flexion 2021   2 from zero to 119 flexion 2021  PROM 2 °  from zero to 120 °  flexion 2021; AROM 2 °  from zero to 125 °   *  12/3/2021 2 from zero to 116 in supine passive and sitting with patient belt   * 2021 0-116  2021 - 0-123    RESTRICTIONS/PRECAUTIONS: Hx HTN, Hyperlipidemia;  Hx stent placment    Exercises/Interventions: Therapeutic Ex (40654)  Therapeutic Activity (89882)  NMR re-education (89071) Sets/Reps Notes/CUES   Bike 5' L3         Slant Board  HR 3 x 30\"  x30    SLB 10 x 10\" Airex   Standing march x30 airex          Step and hold          Mini squat x20 airex    Leg Press 100# x30 DL  80# x20 SL  80# 10 x 10\" Hover  80# x20 Ecc lower    Lateral Step Up 20 x ea 6\"   FSU  2 x 10 6\"    [address] TKE  JUAN standing abd 75# x20  60# x30 R, L    HS curls  Knee extension 40# x30 R, L  20# x30         SAQ 3 x 10 3#   SLR 3 x 10 2#   SL SLR  3 x 102#   LAQ   3#   Bridge 3\" x20    SL clams x30 ea 2 way [address]        Heel Slides with belt & slide board 3\" x20    Heel Prop          Long Sit Gastroc Stretch 3  x  30\"    Long Sit Hamstring Stretch 3  x  30\"     Seated knee flex stretch 3  x 30\"                                                                Manual Intervention (86588)     A/P tibiofemoral joint mobs 3'    Long axis distraction         PROM Knee Flex/Ext 8'                    [address] access code:  Updated 1/10/2022 Access Code: [email or web address]  URL: [email or web address]  Date: 10/11/2021  Prepared by: Francisco Landa    Exercises  Seated Table Hamstring Stretch - 1 x daily - 7 x weekly - 5 reps - 1 sets - 30\" hold  Long Sitting Calf Stretch with Strap - 1 x daily - 7 x weekly - 5 reps - 1 sets - 30\" hold  Supine Quad Set - 1-2 x daily - 7 x weekly - 1-2 sets - 10 reps - 5\" hold  Supine Short Arc Quad - 1 x daily - 7 x weekly - 10 reps - 3 sets  Active Straight Leg Raise with Quad Set - 1 x daily - 7 x weekly - 3 sets - 10 reps  Seated Knee Flexion AAROM - 1 x daily - 7 x weekly - 1 sets - 10 reps - 10\" hold               Patient Education 5' Pt education with HEP and progression of PT along with compliance with HEP to aide with formal PT for optimal outcomes.  Education with proper gait training to improve heel strike and TKE        Therapeutic Exercise and NMR EXR  [x] (97066) Provided verbal/tactile cueing for activities related to strengthening, flexibility, endurance, ROM for improvements in LE, proximal hip, and core control with self care, mobility, lifting, ambulation. [x] (58418) Provided verbal/tactile cueing for activities related to improving balance, coordination, kinesthetic sense, posture, motor skill, proprioception to assist with LE, proximal hip, and core control in self-care, mobility, lifting, ambulation and eccentric single leg control. NMR and Therapeutic Activities:    [x] (34801 or 11204) Provided verbal/tactile cueing for activities related to improving balance, coordination, kinesthetic sense, posture, motor skill, proprioception and motor activation to allow for proper function of core, proximal hip and LE with self-care and ADLs and functional mobility.    [x] (70764) Gait Re-education- Provided training and instruction to the patient for proper LE, core and proximal hip recruitment and positioning and eccentric body weight control with ambulation re-education including up and down stairs     Home Exercise Program:    [x] (29759) Reviewed/Progressed HEP activities related to strengthening, flexibility, endurance, ROM of core, proximal hip and LE for functional self-care, mobility, lifting and ambulation/stair navigation   [x] (85536) Reviewed/Progressed HEP activities related to improving balance, coordination, kinesthetic sense, posture, motor skill, proprioception of core, proximal hip and LE for self-care, mobility, lifting, and ambulation/stair navigation      Manual Treatments:  PROM / STM / Oscillations-Mobs:  G-I, II, III, IV (PA's, Inf., Post.)  [x] (64854) Provided manual therapy to mobilize LE, proximal hip and/or LS spine soft tissue/joints for the purpose of modulating pain, promoting relaxation, increasing ROM, reducing/eliminating soft tissue swelling/inflammation/restriction, improving soft tissue extensibility and allowing for proper ROM for normal function with self-care, mobility, lifting and ambulation. Modalities: Vaso x 10' for edema and pain control with    Charges:  Timed Code Treatment Minutes: 60   Total Treatment Minutes:  70'   BWC:  TE TIME:  NMR TIME:  MANUAL TIME:  UNTIMED MINUTES:  Medicare Total:    -  -  -  -  2022 - 5 visit $650    2021 - Visits (23)  Total: $2210      [] EVAL (LOW) 68661 (typically 20 minutes face-to-face)  [] EVAL (MOD) 93526 (typically 30 minutes face-to-face)  [] EVAL (HIGH) 68790 (typically 45 minutes face-to-face)  [] RE-EVAL     [x] HJ(93691) x 2   [] IONTO  [x] NMR (68713) x   1  [] VASO   [x] Manual (18512) x 1    [] Other:  [] TA x      [] Mech Traction (20184)  [] ES(attended) (83976)      [] ES (un) (29454):    ASSESSMENT:  Patient continues to struggle with terminal knee extension. He demonstrates some ER at the hip, which makes TKE more difficult. GOALS:   Short Term Goals: To be achieved in: 2 weeks  1. Independent in HEP and progression per patient tolerance, in order to prevent re-injury. [] Progressing: [x] Met: [] Not Met: [] Adjusted   2. Patient will have a decrease in pain to facilitate improvement in movement, function, and ADLs as indicated by Functional Deficits. [] Progressing: [x] Met: [] Not Met: [] Adjusted     Long Term Goals: To be achieved in: 12 weeks  1. Disability index score of 20% or less for the LEFS to assist with reaching prior level of function. [] Progressing: [x] Met: [] Not Met: [] Adjusted   2. Patient will demonstrate increased AROM to equal the opposite side bilaterally to allow for proper joint functioning as indicated by patients Functional Deficits. [] Progressing: [x] Met: [] Not Met: [] Adjusted   3. Patient will demonstrate an increase in strength of Left Hip and Knee = Right Hip and Knee to allow for proper functional mobility as indicated by patients Functional Deficits. [] Progressing: [x] Met: [] Not Met: [] Adjusted   4.  Patient will return to all transfers, work activities, and functional activities without increased symptoms or restriction. [] Progressing: [x] Met: [] Not Met: [] Adjusted   5. Patient will have </=1-2/10 pain with ADL's. [x] Progressing: [] Met: [] Not Met: [] Adjusted   6. Patient stated goal: To return to normal walking and functional activity   [x] Progressing: [] Met: [] Not Met: [] Adjusted    Overall Progression Towards Functional goals/ Treatment Progress Update:  [x] Patient is progressing as expected towards functional goals listed. [] Progression is slowed due to complexities/Impairments listed. [] Progression has been slowed due to co-morbidities.   [] Plan just implemented, too soon to assess goals progression <30days   [] Goals require adjustment due to lack of progress  [] Patient is not progressing as expected and requires additional follow up with physician  [] Other    Prognosis for POC: [x] Good [] Fair  [] Poor    Patient requires continued skilled intervention: [x] Yes  [] No    Treatment/Activity Tolerance:  [x] Patient able to complete treatment  [] Patient limited by fatigue  [] Patient limited by pain    [] Patient limited by other medical complications  [] Other:     Return to Play: (if applicable)   []  Stage 1: Intro to Strength   []  Stage 2: Return to Run and Strength   []  Stage 3: Return to Jump and Strength   []  Stage 4: Dynamic Strength and Agility   []  Stage 5: Sport Specific Training     []  Ready to Return to Play, Meets All Above Stages   []  Not Ready for Return to Sports   Comments:                         PLAN: 1x week -2-4 weeks to wean to HEP   [] Continue per plan of care [x] Jerman Wolfe current plan (MD requested patient continue for a few more visits)  [] Plan of care initiated [] Hold pending MD visit [] Discharge    Electronically signed by:  Teddy Brown PTA; ZAID Forbes PT,MPT,ATC,cert DN    Note: If patient does not return for scheduled/ recommended follow up visits, this note will serve as a discharge from care along with most recent update on progress.

## 2022-02-02 ENCOUNTER — HOSPITAL ENCOUNTER (OUTPATIENT)
Dept: PHYSICAL THERAPY | Age: 72
Setting detail: THERAPIES SERIES
Discharge: HOME | End: 2022-02-02
Payer: MEDICARE

## 2022-02-02 PROCEDURE — 97110 THERAPEUTIC EXERCISES: CPT

## 2022-02-02 PROCEDURE — 97112 NEUROMUSCULAR REEDUCATION: CPT

## 2022-02-02 PROCEDURE — 97140 MANUAL THERAPY 1/> REGIONS: CPT

## 2022-02-02 NOTE — FLOWSHEET NOTE
[facility], [address] [place] [address] Ken[place] Davila, [address]  Phone: [phone], [phone]      Physical Therapy Treatment Note/ Progress Report:     Date:  2022    Patient Name:  Dayna Breaux    :  1950  MRN: [id number]  Restrictions/Precautions:    Medical/Treatment Diagnosis Information:  · Diagnosis: Left Knee OA s/p Left TKA 10/6/2021  · Treatment Diagnosis: H88.44  Insurance/Certification information:  PT Insurance Information: Medicare  Physician Information:  Referring Practitioner: Suanne Prader  Has the plan of care been signed (Y/N):        []  Yes  [x]  No     Date of Patient follow up with Physician: Dec 2, 2021    Is this a Progress Report:     []  Yes  [x]  No      If Yes:  Date Range for reporting period:        Beginnin2022 --- Endin2022    Progress report will be due (10 Rx or 30 days whichever is less): 4739    Recertification will be due (POC Duration  / 90 days whichever is less): 2022     Visit # [id number] Matilde Villegas Required   In Person 2022 Med Nec []  Yes     []  No    Tele Health -  []  Yes     []  No    Total 29         Functional Scale:  LEFS:  19% (Score:  65/80)   Date:  1/3/2022     Latex Allergy:  [x]NO      []YES  Preferred Language for Healthcare:   [x]English       []other:    Pain level:  3-4/10 when up and moving      SUBJECTIVE: Patient reports that his knee is feeling well. He states that his other knee is bothering him more. OBJECTIVE: See eval   Observation:    Test measurements:    2 from zero to 120 flexion 2021   2 from zero to 119 flexion 2021  PROM 2 °  from zero to 120 °  flexion 2021; AROM 2 °  from zero to 125 °   *  12/3/2021 2 from zero to 116 in supine passive and sitting with patient belt   * 2021 0-116  2021 - 0-123    RESTRICTIONS/PRECAUTIONS: Hx HTN, Hyperlipidemia;  Hx stent placment    Exercises/Interventions:   Therapeutic Ex (13041)  Therapeutic Activity (52472)  NMR re-education (28556) Sets/Reps Notes/CUES   Bike 5' L3         Slant Board  HR 3 x 30\"  x30    SLB 10 x 10\" Airex                Squats at the bar x20 airex    Leg Press 100# x30 DL  80# x20 SL  80# 10 x 10\" Hover  80# x20 Ecc lower    Lateral Step Up 20 x ea 8\"   FSU  2 x 10 8\"    [address] TKE  JUAN standing abd 75# x20  75# x30 R, L    HS curls  Knee extension 40# x30 R, L  20# x30         SAQ 3 x 10 3#   SLR 3 x 10 2#   SL SLR  3 x 102#   LAQ  3 x 10 3#   Bridge 3\" x20    SL clams x30 ea 2 way Green        Heel Slides with belt 3\" x20         Long Sit Gastroc Stretch 3  x  30\"    Long Sit Hamstring Stretch 3  x  30\"     Seated knee flex stretch 3  x 30\"                                                                Manual Intervention (98666)     A/P tibiofemoral joint mobs 3'            PROM Knee Ext/flex 5'          Manual quad stretch 5'         [email or web address] access code:  Updated 1/10/2022 Access Code: [email or web address]  URL: [email or web address]  Date: 10/11/2021  Prepared by: Manish Sam    Exercises  Seated Table Hamstring Stretch - 1 x daily - 7 x weekly - 5 reps - 1 sets - 30\" hold  Long Sitting Calf Stretch with Strap - 1 x daily - 7 x weekly - 5 reps - 1 sets - 30\" hold  Supine Quad Set - 1-2 x daily - 7 x weekly - 1-2 sets - 10 reps - 5\" hold  Supine Short Arc Quad - 1 x daily - 7 x weekly - 10 reps - 3 sets  Active Straight Leg Raise with Quad Set - 1 x daily - 7 x weekly - 3 sets - 10 reps  Seated Knee Flexion AAROM - 1 x daily - 7 x weekly - 1 sets - 10 reps - 10\" hold               Patient Education 5' Pt education with HEP and progression of PT along with compliance with HEP to aide with formal PT for optimal outcomes.  Education with proper gait training to improve heel strike and TKE        Therapeutic Exercise and NMR EXR  [x] (89216) Provided verbal/tactile cueing for activities related to strengthening, flexibility, endurance, ROM for improvements in LE, Charges:  Timed Code Treatment Minutes: 61'   Total Treatment Minutes:  79'   BWC:  TE TIME:  NMR TIME:  MANUAL TIME:  UNTIMED MINUTES:  Medicare Total:    -  -  -  -  2022 - 6 visit $780    2021 - Visits (23)  Total: $2210      [] EVAL (LOW) 65324 (typically 20 minutes face-to-face)  [] EVAL (MOD) 70573 (typically 30 minutes face-to-face)  [] EVAL (HIGH) 60863 (typically 45 minutes face-to-face)  [] RE-EVAL     [x] VG(28696) x 2    [] IONTO  [x] NMR (71207) x 1   [] VASO   [x] Manual (57689) x 1     [] Other:  [] TA x       [] Mech Traction (27144)  [] ES(attended) (43359)      [] ES (un) (70859):    ASSESSMENT:  Patient tolerated treatment well on this date as noted by no complaints following today's session. He continues to demonstrate difficulties with terminal knee extension with ambulation secondary to ROM limitations but demonstrates improvements with tolerance to functional activities. GOALS:   Short Term Goals: To be achieved in: 2 weeks  1. Independent in HEP and progression per patient tolerance, in order to prevent re-injury. [] Progressing: [x] Met: [] Not Met: [] Adjusted   2. Patient will have a decrease in pain to facilitate improvement in movement, function, and ADLs as indicated by Functional Deficits. [] Progressing: [x] Met: [] Not Met: [] Adjusted     Long Term Goals: To be achieved in: 12 weeks  1. Disability index score of 20% or less for the LEFS to assist with reaching prior level of function. [] Progressing: [x] Met: [] Not Met: [] Adjusted   2. Patient will demonstrate increased AROM to equal the opposite side bilaterally to allow for proper joint functioning as indicated by patients Functional Deficits. [] Progressing: [x] Met: [] Not Met: [] Adjusted   3. Patient will demonstrate an increase in strength of Left Hip and Knee = Right Hip and Knee to allow for proper functional mobility as indicated by patients Functional Deficits.    [] Progressing: [x] Met: [] Not Met: [] Adjusted   4. Patient will return to all transfers, work activities, and functional activities without increased symptoms or restriction. [] Progressing: [x] Met: [] Not Met: [] Adjusted   5. Patient will have </=1-2/10 pain with ADL's. [x] Progressing: [] Met: [] Not Met: [] Adjusted   6. Patient stated goal: To return to normal walking and functional activity   [x] Progressing: [] Met: [] Not Met: [] Adjusted    Overall Progression Towards Functional goals/ Treatment Progress Update:  [x] Patient is progressing as expected towards functional goals listed. [] Progression is slowed due to complexities/Impairments listed. [] Progression has been slowed due to co-morbidities.   [] Plan just implemented, too soon to assess goals progression <30days   [] Goals require adjustment due to lack of progress  [] Patient is not progressing as expected and requires additional follow up with physician  [] Other    Prognosis for POC: [x] Good [] Fair  [] Poor    Patient requires continued skilled intervention: [x] Yes  [] No    Treatment/Activity Tolerance:  [x] Patient able to complete treatment  [] Patient limited by fatigue  [] Patient limited by pain    [] Patient limited by other medical complications  [] Other:     Return to Play: (if applicable)   []  Stage 1: Intro to Strength   []  Stage 2: Return to Run and Strength   []  Stage 3: Return to Jump and Strength   []  Stage 4: Dynamic Strength and Agility   []  Stage 5: Sport Specific Training     []  Ready to Return to Play, Meets All Above Stages   []  Not Ready for Return to Sports   Comments:                         PLAN: 1x week -2-4 weeks to wean to HEP   [] Continue per plan of care [x] Huy Roles current plan (MD requested patient continue for a few more visits)  [] Plan of care initiated [] Hold pending MD visit [] Discharge    Electronically signed by:  Heydi Pichardo, PT, MPT, ATC, Cert DN    Note: If patient does not return for scheduled/ recommended follow

## 2022-02-07 ENCOUNTER — TELEPHONE (OUTPATIENT)
Dept: FAMILY MEDICINE CLINIC | Age: 72
End: 2022-02-07

## 2022-02-07 NOTE — TELEPHONE ENCOUNTER
----- Message from Kim Zayas sent at 2/7/2022  9:03 AM EST -----  Subject: Message to Provider    QUESTIONS  Information for Provider? Pt has a mersa on his elbow he needs drained and   wanted to know when he could get into the office to get that done   ---------------------------------------------------------------------------  --------------  [address]  What is the best way for the office to contact you? OK to leave message on   voicemail  Preferred Call Back Phone Number? [phone]  ---------------------------------------------------------------------------  --------------  SCRIPT ANSWERS  Relationship to Patient? Self  (Is the patient requesting to see the provider for a procedure?)?  Yes

## 2022-02-07 NOTE — TELEPHONE ENCOUNTER
Patient wants to know if you could drain fluid from his elbow.   He has had it done by a orthopedic in the past but would prefer not to drive to the [facility]

## 2022-02-09 ENCOUNTER — HOSPITAL ENCOUNTER (OUTPATIENT)
Dept: PHYSICAL THERAPY | Age: 72
Setting detail: THERAPIES SERIES
Discharge: HOME | End: 2022-02-09
Payer: MEDICARE

## 2022-02-09 PROCEDURE — 97140 MANUAL THERAPY 1/> REGIONS: CPT

## 2022-02-09 PROCEDURE — 97112 NEUROMUSCULAR REEDUCATION: CPT

## 2022-02-09 PROCEDURE — 97110 THERAPEUTIC EXERCISES: CPT

## 2022-02-09 NOTE — FLOWSHEET NOTE
[facility], [address] [place] [address] [place] Davila, [address]  Phone: [phone], [phone]    Date: 2022          Patient Name; :  Samir Lock; 1950   Dx/ICD Code:Diagnosis: Left Knee OA s/p Left TKA 10/6/2021  Treatment Diagnosis: M17.12        Physician: Kayleigh Gonzalez        Total PT Visits: 31     Measures Previous Current   Pain (0-10)  1-2/10   Disability %  LEFS:  19% (Score:  65/80)   ROM  0-125 °              Strength       B Hip 4+/5     B Knee 4+/5     Specific Functional Improvements & Impressions:  Patient continues to demonstrate improvements with functional mobility and activity. Plan to possible D/C visit with re-education on HEP. Plan & Recommendations:  [x] Continue rehabilitation due to objective improvement and continued functional deficits with frequency and duration: 1 x week for 1-2 weeks to transition to HEP  [x] Progress toward  []GAP, []Work Conditioning, [x]Independent HEP   [] Discharge due to   [] All goals achieved, [] Maximized \"medical necessity\" [] No subjective or objective improvements        Electronically signed by:  Dao Montes, PT , MPT,ATC, cert DN    Therapy Plan of Care Re-Certification  This patient has been re-evaluated for physical therapy services and for therapy to continue, Medicare, Medicaid and other insurances require periodic physician review of the treatment plan.  Please review the above re-evaluation and verify that you agree with plan of care as established above by signing the attached document and return it to our office or note changes to established plan below  [] Follow treatment plan as above [] Discontinue physical therapy  [] Change plan to:                                 __________________________________________________    Physician Signature:____________________________________ Date:____________  By signing above, therapists plan is approved by physician    If you have any questions or concerns, please don't hesitate to call. Thank you for your referral.    [facility]) Therapy office  ([phone])/[facility] [phone]       Physical Therapy Treatment Note/ Progress Report:     Date:  2022    Patient Name:  Regan Andino    :  1950  MRN: [id number]  Restrictions/Precautions:    Medical/Treatment Diagnosis Information:  · Diagnosis: Left Knee OA s/p Left TKA 10/6/2021  · Treatment Diagnosis: B48.75  Insurance/Certification information:  PT Insurance Information: Medicare  Physician Information:  Referring Practitioner: Lalito Carrasquillo  Has the plan of care been signed (Y/N):        []  Yes  [x]  No     Date of Patient follow up with Physician: Dec 2, 2021    Is this a Progress Report:     []  Yes  [x]  No      If Yes:  Date Range for reporting period:      Beginnin/3/2022 --- Endin/3/2022  Beginnin2022 --- Ending:  3/7/2022      Progress report will be due (10 Rx or 30 days whichever is less): 5018     Recertification will be due (POC Duration  / 90 days whichever is less): 3/7/2022      Visit # 76752 Matilde Villegas Required   In Person 2022 Med Nec []  Yes     []  No    Tele Health -  []  Yes     []  No    Total 31              Functional Scale:  LEFS:  19% (Score:  65/80)   Date assessed:  2022 (no change from 1/3/2022)    Latex Allergy:  [x]NO      []YES       Preferred Language for Healthcare:   [x]English       []other:    Pain level:  1-2/10    SUBJECTIVE: see PN     OBJECTIVE: See eval   Observation:    Test measurements:    2 from zero to 120 flexion 2021   2 from zero to 119 flexion 2021  PROM 2 °  from zero to 120 °  flexion 2021; AROM 2 °  from zero to 125 °   *  12/3/2021 2 from zero to 116 in supine passive and sitting with patient belt   * 2021 0-116  2021 - 0-123    RESTRICTIONS/PRECAUTIONS: Hx HTN, Hyperlipidemia;  Hx stent placment    Exercises/Interventions:   Therapeutic Ex (06012)  Therapeutic Activity (00783)  NMR re-education (24558) Sets/Reps Notes/CUES   Bike 5' L3         Slant Board  HR 3 x 30\"  x30    SLB 10 x 10\" Airex   Standing march x30 airex          Step and hold          Mini squat x20 airex    Leg Press 120# x30 DL  100# x20 SL  120# 10 x 10\" Hover  100# x20 Ecc lower    Lateral Step Up 20 x ea 6\"   FSU  2 x 10 6\"    [address] TKE  JUAN standing abd 75# x20  60# x30 R, L    HS curls  Knee extension 40# x30 R, L  30# x 20 R,L  DL 40# x30  SL 20# x 20          SAQ 3 x 10 3#   SLR 3 x 10 2#   SL SLR  3 x 102#   LAQ   3#   Bridge 3\" x20    SL clams x30 ea 2 way [address]        Heel Slides with belt & slide board 3\" x20    Heel Prop          Long Sit Gastroc Stretch 3  x  30\"    Long Sit Hamstring Stretch 3  x  30\"     Seated knee flex stretch 3  x 30\"                                                                Manual Intervention (26181)     A/P tibiofemoral joint mobs 3'            PROM Knee Flex/Ext 8'                    [address] access code:  Updated 1/10/2022 Access Code: [email or web address]  URL: [email or web address]  Date: 10/11/2021  Prepared by: Cody Manrique    Exercises  Seated Table Hamstring Stretch - 1 x daily - 7 x weekly - 5 reps - 1 sets - 30\" hold  Long Sitting Calf Stretch with Strap - 1 x daily - 7 x weekly - 5 reps - 1 sets - 30\" hold  Supine Quad Set - 1-2 x daily - 7 x weekly - 1-2 sets - 10 reps - 5\" hold  Supine Short Arc Quad - 1 x daily - 7 x weekly - 10 reps - 3 sets  Active Straight Leg Raise with Quad Set - 1 x daily - 7 x weekly - 3 sets - 10 reps  Seated Knee Flexion AAROM - 1 x daily - 7 x weekly - 1 sets - 10 reps - 10\" hold               Patient Education 5' Pt education with HEP and progression of PT along with compliance with HEP to aide with formal PT for optimal outcomes.  Education with proper gait training to improve heel strike and TKE        Therapeutic Exercise and NMR EXR  [x] (87468) Provided verbal/tactile cueing for activities related to strengthening, flexibility, endurance, ROM for improvements in LE, proximal hip, and core control with self care, mobility, lifting, ambulation. [x] (38053) Provided verbal/tactile cueing for activities related to improving balance, coordination, kinesthetic sense, posture, motor skill, proprioception to assist with LE, proximal hip, and core control in self-care, mobility, lifting, ambulation and eccentric single leg control. NMR and Therapeutic Activities:    [x] (86842 or 58735) Provided verbal/tactile cueing for activities related to improving balance, coordination, kinesthetic sense, posture, motor skill, proprioception and motor activation to allow for proper function of core, proximal hip and LE with self-care and ADLs and functional mobility. [x] (55975) Gait Re-education- Provided training and instruction to the patient for proper LE, core and proximal hip recruitment and positioning and eccentric body weight control with ambulation re-education including up and down stairs     Home Exercise Program:    [x] (02375) Reviewed/Progressed HEP activities related to strengthening, flexibility, endurance, ROM of core, proximal hip and LE for functional self-care, mobility, lifting and ambulation/stair navigation   [x] (17477) Reviewed/Progressed HEP activities related to improving balance, coordination, kinesthetic sense, posture, motor skill, proprioception of core, proximal hip and LE for self-care, mobility, lifting, and ambulation/stair navigation      Manual Treatments:  PROM / STM / Oscillations-Mobs:  G-I, II, III, IV (PA's, Inf., Post.)  [x] (74237) Provided manual therapy to mobilize LE, proximal hip and/or LS spine soft tissue/joints for the purpose of modulating pain, promoting relaxation, increasing ROM, reducing/eliminating soft tissue swelling/inflammation/restriction, improving soft tissue extensibility and allowing for proper ROM for normal function with self-care, mobility, lifting and ambulation.      Modalities: Charges:  Timed Code Treatment Minutes: 60   Total Treatment Minutes:  60'   BWC:  TE TIME:  NMR TIME:  MANUAL TIME:  UNTIMED MINUTES:  Medicare Total:    -  -  -  -  2022 -  visit 8 total $1040    2021 - Visits (23)  Total: $2210      [] EVAL (LOW) 63574 (typically 20 minutes face-to-face)  [] EVAL (MOD) 43049 (typically 30 minutes face-to-face)  [] EVAL (HIGH) 09589 (typically 45 minutes face-to-face)  [] RE-EVAL     [x] OX(82023) x 2   [] IONTO  [x] NMR (52724) x   1  [] VASO   [x] Manual (80788) x 1    [] Other:  [] TA x      [] Mech Traction (50200)  [] ES(attended) (25927)      [] ES (un) (54890):    ASSESSMENT:  Patient continues to struggle with terminal knee extension. He demonstrates some ER at the hip, which makes TKE more difficult. GOALS:   Short Term Goals: To be achieved in: 2 weeks  1. Independent in HEP and progression per patient tolerance, in order to prevent re-injury. [] Progressing: [x] Met: [] Not Met: [] Adjusted   2. Patient will have a decrease in pain to facilitate improvement in movement, function, and ADLs as indicated by Functional Deficits. [] Progressing: [x] Met: [] Not Met: [] Adjusted     Long Term Goals: To be achieved in: 12 weeks  1. Disability index score of 20% or less for the LEFS to assist with reaching prior level of function. [] Progressing: [x] Met: [] Not Met: [] Adjusted   2. Patient will demonstrate increased AROM to equal the opposite side bilaterally to allow for proper joint functioning as indicated by patients Functional Deficits. [] Progressing: [x] Met: [] Not Met: [] Adjusted   3. Patient will demonstrate an increase in strength of Left Hip and Knee = Right Hip and Knee to allow for proper functional mobility as indicated by patients Functional Deficits. [] Progressing: [x] Met: [] Not Met: [] Adjusted   4. Patient will return to all transfers, work activities, and functional activities without increased symptoms or restriction.    [] Progressing: [x] Met: [] Not Met: [] Adjusted   5. Patient will have </=1-2/10 pain with ADL's.  [] Progressing: [x] Met: [] Not Met: [] Adjusted   6. Patient stated goal: To return to normal walking and functional activity   [x] Progressing: [] Met: [] Not Met: [] Adjusted    Overall Progression Towards Functional goals/ Treatment Progress Update:  [x] Patient is progressing as expected towards functional goals listed. [] Progression is slowed due to complexities/Impairments listed. [] Progression has been slowed due to co-morbidities. [] Plan just implemented, too soon to assess goals progression <30days   [] Goals require adjustment due to lack of progress  [] Patient is not progressing as expected and requires additional follow up with physician  [] Other    Prognosis for POC: [x] Good [] Fair  [] Poor    Patient requires continued skilled intervention: [x] Yes  [] No    Treatment/Activity Tolerance:  [x] Patient able to complete treatment  [] Patient limited by fatigue  [] Patient limited by pain    [] Patient limited by other medical complications  [] Other:     Return to Play: (if applicable)   []  Stage 1: Intro to Strength   []  Stage 2: Return to Run and Strength   []  Stage 3: Return to Jump and Strength   []  Stage 4: Dynamic Strength and Agility   []  Stage 5: Sport Specific Training     []  Ready to Return to Play, Meets All Above Stages   []  Not Ready for Return to Sports   Comments:                         PLAN: 1x week  1-2 weeks to wean to HEP   [] Continue per plan of care [x] Alter current plan (MD requested patient continue for a few more visits)  [] Plan of care initiated [] Hold pending MD visit [] Discharge    Electronically signed by:  Travon Cross PT; ZAID Alvarez PT,MPT,ATC,cert DN    Note: If patient does not return for scheduled/ recommended follow up visits, this note will serve as a discharge from care along with most recent update on progress.

## 2022-02-15 ENCOUNTER — CLINICAL DOCUMENTATION (OUTPATIENT)
Dept: OTHER | Age: 72
End: 2022-02-15

## 2022-02-16 ENCOUNTER — HOSPITAL ENCOUNTER (OUTPATIENT)
Dept: PHYSICAL THERAPY | Age: 72
Setting detail: THERAPIES SERIES
Discharge: HOME | End: 2022-02-16
Payer: MEDICARE

## 2022-02-16 PROCEDURE — 97140 MANUAL THERAPY 1/> REGIONS: CPT | Performed by: PHYSICAL THERAPY ASSISTANT

## 2022-02-16 PROCEDURE — 97110 THERAPEUTIC EXERCISES: CPT | Performed by: PHYSICAL THERAPY ASSISTANT

## 2022-02-16 PROCEDURE — 97112 NEUROMUSCULAR REEDUCATION: CPT | Performed by: PHYSICAL THERAPY ASSISTANT

## 2022-02-16 NOTE — FLOWSHEET NOTE
from zero to 119 flexion 11/19/2021  PROM 2 °  from zero to 120 °  flexion 11/29/2021; AROM 2 °  from zero to 125 °   *  12/3/2021 2 from zero to 116 in supine passive and sitting with patient belt   * 12/6/2021 0-116  12/29/2021 - 0-123    RESTRICTIONS/PRECAUTIONS: Hx HTN, Hyperlipidemia; Hx stent placment    Exercises/Interventions:   Therapeutic Ex (42837)  Therapeutic Activity (42443)  NMR re-education (66383) Sets/Reps Notes/CUES   Bike 5' L3         Slant Board  HR 3 x 30\"  x30    SLB 10 x 10\" Airex   Standing march x30 airex          Step and hold          Mini squat x20 airex    Leg Press 120# x30 DL  100# x20 SL  120# 10 x 10\" Hover  100# x20 Ecc lower    Lateral Step Up 20 x ea 6\"   FSU  2 x 10 6\"    [address] TKE  JUAN standing abd 75# x20  60# x30 R, L    HS curls  Knee extension 40# x30 R, L  30# x 20 R,L  DL 40# x30  SL 20# x 20          SAQ 3 x 10 3#   SLR 3 x 10 2#   SL SLR  3 x 102#   LAQ   3#   Bridge 3\" x20    SL clams x30 ea 2 way Green        Heel Slides with belt & slide board 3\" x20    Heel Prop          Long Sit Gastroc Stretch 3  x  30\"    Long Sit Hamstring Stretch 3  x  30\"     Seated knee flex stretch 3  x 30\"                                                                Manual Intervention (07225)     A/P tibiofemoral joint mobs 3'            PROM Knee Flex/Ext 8'                    [address] access code:  Updated 1/10/2022 Access Code: [email or web address]  URL: [email or web address]  Date: 10/11/2021  Prepared by: Kishore Marie    Exercises  Seated Table Hamstring Stretch - 1 x daily - 7 x weekly - 5 reps - 1 sets - 30\" hold  Long Sitting Calf Stretch with Strap - 1 x daily - 7 x weekly - 5 reps - 1 sets - 30\" hold  Supine Quad Set - 1-2 x daily - 7 x weekly - 1-2 sets - 10 reps - 5\" hold  Supine Short Arc Quad - 1 x daily - 7 x weekly - 10 reps - 3 sets  Active Straight Leg Raise with Quad Set - 1 x daily - 7 x weekly - 3 sets - 10 reps  Seated Knee Flexion AAROM - 1 x daily - 7 x weekly - 1 sets - 10 reps - 10\" hold               Patient Education 5' Pt education with HEP and progression of PT along with compliance with HEP to aide with formal PT for optimal outcomes. Education with proper gait training to improve heel strike and TKE        Therapeutic Exercise and NMR EXR  [x] (03515) Provided verbal/tactile cueing for activities related to strengthening, flexibility, endurance, ROM for improvements in LE, proximal hip, and core control with self care, mobility, lifting, ambulation. [x] (03576) Provided verbal/tactile cueing for activities related to improving balance, coordination, kinesthetic sense, posture, motor skill, proprioception to assist with LE, proximal hip, and core control in self-care, mobility, lifting, ambulation and eccentric single leg control. NMR and Therapeutic Activities:    [x] (73814 or 99801) Provided verbal/tactile cueing for activities related to improving balance, coordination, kinesthetic sense, posture, motor skill, proprioception and motor activation to allow for proper function of core, proximal hip and LE with self-care and ADLs and functional mobility.    [x] (80938) Gait Re-education- Provided training and instruction to the patient for proper LE, core and proximal hip recruitment and positioning and eccentric body weight control with ambulation re-education including up and down stairs     Home Exercise Program:    [x] (14702) Reviewed/Progressed HEP activities related to strengthening, flexibility, endurance, ROM of core, proximal hip and LE for functional self-care, mobility, lifting and ambulation/stair navigation   [x] (88331) Reviewed/Progressed HEP activities related to improving balance, coordination, kinesthetic sense, posture, motor skill, proprioception of core, proximal hip and LE for self-care, mobility, lifting, and ambulation/stair navigation      Manual Treatments:  PROM / STM / Oscillations-Mobs:  G-I, II, III, IV (PA's, Inf., Post.)  [x] (09747) Provided manual therapy to mobilize LE, proximal hip and/or LS spine soft tissue/joints for the purpose of modulating pain, promoting relaxation, increasing ROM, reducing/eliminating soft tissue swelling/inflammation/restriction, improving soft tissue extensibility and allowing for proper ROM for normal function with self-care, mobility, lifting and ambulation. Modalities:    Charges:  Timed Code Treatment Minutes: 60   Total Treatment Minutes:  60'   BWC:  TE TIME:  NMR TIME:  MANUAL TIME:  UNTIMED MINUTES:  Medicare Total:    -  -  -  -  2022 -  visit 9 total $1170    2021 - Visits (23)  Total: $2210      [] EVAL (LOW) 67842 (typically 20 minutes face-to-face)  [] EVAL (MOD) 33579 (typically 30 minutes face-to-face)  [] EVAL (HIGH) 41236 (typically 45 minutes face-to-face)  [] RE-EVAL     [x] LL(80507) x 2   [] IONTO  [x] NMR (25146) x   1  [] VASO   [x] Manual (89093) x 1    [] Other:  [] TA x      [] Mech Traction (02477)  [] ES(attended) (76278)      [] ES (un) (20833):    ASSESSMENT:  Patient continues to struggle with terminal knee extension. He demonstrates some ER at the hip, which makes TKE more difficult. GOALS:   Short Term Goals: To be achieved in: 2 weeks  1. Independent in HEP and progression per patient tolerance, in order to prevent re-injury. [] Progressing: [x] Met: [] Not Met: [] Adjusted   2. Patient will have a decrease in pain to facilitate improvement in movement, function, and ADLs as indicated by Functional Deficits. [] Progressing: [x] Met: [] Not Met: [] Adjusted     Long Term Goals: To be achieved in: 12 weeks  1. Disability index score of 20% or less for the LEFS to assist with reaching prior level of function. [] Progressing: [x] Met: [] Not Met: [] Adjusted   2. Patient will demonstrate increased AROM to equal the opposite side bilaterally to allow for proper joint functioning as indicated by patients Functional Deficits.    [] Progressing: [x] Met: [] Not Met: [] Adjusted   3. Patient will demonstrate an increase in strength of Left Hip and Knee = Right Hip and Knee to allow for proper functional mobility as indicated by patients Functional Deficits. [] Progressing: [x] Met: [] Not Met: [] Adjusted   4. Patient will return to all transfers, work activities, and functional activities without increased symptoms or restriction. [] Progressing: [x] Met: [] Not Met: [] Adjusted   5. Patient will have </=1-2/10 pain with ADL's.  [] Progressing: [x] Met: [] Not Met: [] Adjusted   6. Patient stated goal: To return to normal walking and functional activity   [x] Progressing: [] Met: [] Not Met: [] Adjusted    Overall Progression Towards Functional goals/ Treatment Progress Update:  [x] Patient is progressing as expected towards functional goals listed. [] Progression is slowed due to complexities/Impairments listed. [] Progression has been slowed due to co-morbidities.   [] Plan just implemented, too soon to assess goals progression <30days   [] Goals require adjustment due to lack of progress  [] Patient is not progressing as expected and requires additional follow up with physician  [] Other    Prognosis for POC: [x] Good [] Fair  [] Poor    Patient requires continued skilled intervention: [x] Yes  [] No    Treatment/Activity Tolerance:  [x] Patient able to complete treatment  [] Patient limited by fatigue  [] Patient limited by pain    [] Patient limited by other medical complications  [] Other:     Return to Play: (if applicable)   []  Stage 1: Intro to Strength   []  Stage 2: Return to Run and Strength   []  Stage 3: Return to Jump and Strength   []  Stage 4: Dynamic Strength and Agility   []  Stage 5: Sport Specific Training     []  Ready to Return to Play, Meets All Above Stages   []  Not Ready for Return to Sports   Comments:                         PLAN: 1x week  1-2 weeks to wean to HEP   [x] Continue per plan of care [] Alter current plan (MD requested patient continue for a few more visits)  [] Plan of care initiated [] Hold pending MD visit [] Discharge    Electronically signed by:  Darylene Favre, PTA; ZAID Alvarez PT,MPT,ATC,cert DN    Note: If patient does not return for scheduled/ recommended follow up visits, this note will serve as a discharge from care along with most recent update on progress.

## 2022-02-23 ENCOUNTER — HOSPITAL ENCOUNTER (OUTPATIENT)
Dept: PHYSICAL THERAPY | Age: 72
Setting detail: THERAPIES SERIES
Discharge: HOME | End: 2022-02-23
Payer: MEDICARE

## 2022-02-23 PROCEDURE — 97112 NEUROMUSCULAR REEDUCATION: CPT | Performed by: PHYSICAL THERAPY ASSISTANT

## 2022-02-23 PROCEDURE — 97110 THERAPEUTIC EXERCISES: CPT | Performed by: PHYSICAL THERAPY ASSISTANT

## 2022-02-23 PROCEDURE — 97140 MANUAL THERAPY 1/> REGIONS: CPT | Performed by: PHYSICAL THERAPY ASSISTANT

## 2022-02-23 NOTE — FLOWSHEET NOTE
[facility], [address] [place] [address] Halina Davila [address], [address]  Phone: [phone], [phone]        Physical Therapy Treatment Note/ Progress Report:     Date:  2022    Patient Name:  Ovi Hussein    :  1950  MRN: [id number]  Restrictions/Precautions:    Medical/Treatment Diagnosis Information:  · Diagnosis: Left Knee OA s/p Left TKA 10/6/2021  · Treatment Diagnosis: B26.53  Insurance/Certification information:  PT Insurance Information: Medicare  Physician Information:  Referring Practitioner: Eryn Servin  Has the plan of care been signed (Y/N):        []  Yes  [x]  No     Date of Patient follow up with Physician: Dec 2, 2021    Is this a Progress Report:     []  Yes  [x]  No      If Yes:  Date Range for reporting period:      Beginnin/3/2022 --- Endin/3/2022  Beginnin2022 --- Ending:  3/7/2022      Progress report will be due (10 Rx or 30 days whichever is less): 7343     Recertification will be due (POC Duration  / 90 days whichever is less): 3/7/2022      Visit # [id number] Matilde Villegas Required   In Person 10 - 2022  23 - 2021 Med Nec []  Yes     []  No    Tele Health -  []  Yes     []  No    Total 33              Functional Scale:  LEFS:  19% (Score:  65/80)   Date assessed:  2022 (no change from 1/3/2022)    Latex Allergy:  [x]NO      []YES       Preferred Language for Healthcare:   [x]English       []other:    Pain level:  1-2/10    SUBJECTIVE:Doing well - 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 He notes that overall he is doing well but when he is on his feet for prolonged periods he does get soreness. We have discussed that he will have these changes for a year.        OBJECTIVE: See eval   Observation:    Test measurements:    2 from zero to 120 flexion 2021   2 from zero to 119 flexion 2021  PROM 2 °  from zero to 120 ° flexion 11/29/2021; AROM 2 °  from zero to 125 °   *  12/3/2021 2 from zero to 116 in supine passive and sitting with patient belt   * 12/6/2021 0-116  12/29/2021 - 0-123    RESTRICTIONS/PRECAUTIONS: Hx HTN, Hyperlipidemia; Hx stent placment    Exercises/Interventions:   Therapeutic Ex (10423)  Therapeutic Activity (17060)  NMR re-education (85841) Sets/Reps Notes/CUES   Bike 5' L3         Slant Board  HR 3 x 30\"  x30    SLB 10 x 10\" Airex   Standing march x30 airex          Step and hold          Mini squat x20 airex    Leg Press 120# x30 DL  100# x20 SL  120# 10 x 10\" Hover  100# x20 Ecc lower    Lateral Step Up 20 x ea 6\"   FSU  2 x 10 6\"    [address] TKE  JUAN standing abd 75# x20  60# x30 R, L    HS curls  Knee extension 40# x30 R, L  30# x 20 R,L  DL 40# x30  SL 20# x 20          SAQ 3 x 10 3#   SLR 3 x 10 2#   SL SLR  3 x 102#   LAQ   3#   Bridge 3\" x20    SL clams x30 ea 2 way [address]        Heel Slides with belt & slide board 3\" x20    Heel Prop          Long Sit Gastroc Stretch 3  x  30\"    Long Sit Hamstring Stretch 3  x  30\"     Seated knee flex stretch 3  x 30\"                                                                Manual Intervention (16275)     A/P tibiofemoral joint mobs 3'            PROM Knee Flex/Ext 8'                    [address] access code:  Updated 1/10/2022 Access Code: [email or web address]  URL: [email or web address]  Date: 10/11/2021  Prepared by: Kirk Hammer    Exercises  Seated Table Hamstring Stretch - 1 x daily - 7 x weekly - 5 reps - 1 sets - 30\" hold  Long Sitting Calf Stretch with Strap - 1 x daily - 7 x weekly - 5 reps - 1 sets - 30\" hold  Supine Quad Set - 1-2 x daily - 7 x weekly - 1-2 sets - 10 reps - 5\" hold  Supine Short Arc Quad - 1 x daily - 7 x weekly - 10 reps - 3 sets  Active Straight Leg Raise with Quad Set - 1 x daily - 7 x weekly - 3 sets - 10 reps  Seated Knee Flexion AAROM - 1 x daily - 7 x weekly - 1 sets - 10 reps - 10\" hold               Patient Education 5' Pt education with HEP and progression of PT along with compliance with HEP to aide with formal PT for optimal outcomes. Education with proper gait training to improve heel strike and TKE        Therapeutic Exercise and NMR EXR  [x] (13090) Provided verbal/tactile cueing for activities related to strengthening, flexibility, endurance, ROM for improvements in LE, proximal hip, and core control with self care, mobility, lifting, ambulation. [x] (09541) Provided verbal/tactile cueing for activities related to improving balance, coordination, kinesthetic sense, posture, motor skill, proprioception to assist with LE, proximal hip, and core control in self-care, mobility, lifting, ambulation and eccentric single leg control. NMR and Therapeutic Activities:    [x] (76736 or 65508) Provided verbal/tactile cueing for activities related to improving balance, coordination, kinesthetic sense, posture, motor skill, proprioception and motor activation to allow for proper function of core, proximal hip and LE with self-care and ADLs and functional mobility.    [x] (99886) Gait Re-education- Provided training and instruction to the patient for proper LE, core and proximal hip recruitment and positioning and eccentric body weight control with ambulation re-education including up and down stairs     Home Exercise Program:    [x] (52835) Reviewed/Progressed HEP activities related to strengthening, flexibility, endurance, ROM of core, proximal hip and LE for functional self-care, mobility, lifting and ambulation/stair navigation   [x] (06897) Reviewed/Progressed HEP activities related to improving balance, coordination, kinesthetic sense, posture, motor skill, proprioception of core, proximal hip and LE for self-care, mobility, lifting, and ambulation/stair navigation      Manual Treatments:  PROM / STM / Oscillations-Mobs:  G-I, II, III, IV (PA's, Inf., Post.)  [x] (99791) Provided manual therapy to mobilize LE, proximal hip and/or LS spine soft tissue/joints for the purpose of modulating pain, promoting relaxation, increasing ROM, reducing/eliminating soft tissue swelling/inflammation/restriction, improving soft tissue extensibility and allowing for proper ROM for normal function with self-care, mobility, lifting and ambulation. Modalities:  Vaso x 10' for edema and pain control with    Charges:  Timed Code Treatment Minutes: 60   Total Treatment Minutes:  70'   BWC:  TE TIME:  NMR TIME:  MANUAL TIME:  UNTIMED MINUTES:  Medicare Total:    -  -  -  -  2022 -  visit 10 total $1300    2021 - Visits (23)  Total: $2210      [] EVAL (LOW) 52141 (typically 20 minutes face-to-face)  [] EVAL (MOD) 50479 (typically 30 minutes face-to-face)  [] EVAL (HIGH) 41055 (typically 45 minutes face-to-face)  [] RE-EVAL     [x] GA(11475) x 2   [] IONTO  [x] NMR (27091) x   1  [] VASO   [x] Manual (67699) x 1    [] Other:  [] TA x      [] Mech Traction (02280)  [] ES(attended) (83236)      [] ES (un) (68325):    ASSESSMENT:  Patient continues to struggle with terminal knee extension. He demonstrates some ER at the hip, which makes TKE more difficult. GOALS:   Short Term Goals: To be achieved in: 2 weeks  1. Independent in HEP and progression per patient tolerance, in order to prevent re-injury. [] Progressing: [x] Met: [] Not Met: [] Adjusted   2. Patient will have a decrease in pain to facilitate improvement in movement, function, and ADLs as indicated by Functional Deficits. [] Progressing: [x] Met: [] Not Met: [] Adjusted     Long Term Goals: To be achieved in: 12 weeks  1. Disability index score of 20% or less for the LEFS to assist with reaching prior level of function. [] Progressing: [x] Met: [] Not Met: [] Adjusted   2. Patient will demonstrate increased AROM to equal the opposite side bilaterally to allow for proper joint functioning as indicated by patients Functional Deficits. [] Progressing: [x] Met: [] Not Met: [] Adjusted   3.  Patient will demonstrate an increase in strength of Left Hip and Knee = Right Hip and Knee to allow for proper functional mobility as indicated by patients Functional Deficits. [] Progressing: [x] Met: [] Not Met: [] Adjusted   4. Patient will return to all transfers, work activities, and functional activities without increased symptoms or restriction. [] Progressing: [x] Met: [] Not Met: [] Adjusted   5. Patient will have </=1-2/10 pain with ADL's.  [] Progressing: [x] Met: [] Not Met: [] Adjusted   6. Patient stated goal: To return to normal walking and functional activity   [] Progressing: [x] Met: [] Not Met: [] Adjusted    Overall Progression Towards Functional goals/ Treatment Progress Update:  [x] Patient is progressing as expected towards functional goals listed. [] Progression is slowed due to complexities/Impairments listed. [] Progression has been slowed due to co-morbidities.   [] Plan just implemented, too soon to assess goals progression <30days   [] Goals require adjustment due to lack of progress  [] Patient is not progressing as expected and requires additional follow up with physician  [] Other    Prognosis for POC: [x] Good [] Fair  [] Poor    Patient requires continued skilled intervention: [x] Yes  [] No    Treatment/Activity Tolerance:  [x] Patient able to complete treatment  [] Patient limited by fatigue  [] Patient limited by pain    [] Patient limited by other medical complications  [] Other:     Return to Play: (if applicable)   []  Stage 1: Intro to Strength   []  Stage 2: Return to Run and Strength   []  Stage 3: Return to Jump and Strength   []  Stage 4: Dynamic Strength and Agility   []  Stage 5: Sport Specific Training     []  Ready to Return to Play, Meets All Above Stages   []  Not Ready for Return to Sports   Comments:                         PLAN:  [] Continue per plan of care [] Alter current plan (MD requested patient continue for a few more visits)  [] Plan of care initiated [] Hold pending MD visit [x] Discharge    Electronically signed by:  Hettie Homans, PTA    Note: If patient does not return for scheduled/ recommended follow up visits, this note will serve as a discharge from care along with most recent update on progress.

## 2022-03-15 ENCOUNTER — TELEMEDICINE (OUTPATIENT)
Dept: FAMILY MEDICINE CLINIC | Age: 72
End: 2022-03-15
Payer: MEDICARE

## 2022-03-15 DIAGNOSIS — Z00.00 MEDICARE ANNUAL WELLNESS VISIT, SUBSEQUENT: Primary | ICD-10-CM

## 2022-03-15 PROCEDURE — 3017F COLORECTAL CA SCREEN DOC REV: CPT | Performed by: FAMILY MEDICINE

## 2022-03-15 PROCEDURE — G0439 PPPS, SUBSEQ VISIT: HCPCS | Performed by: FAMILY MEDICINE

## 2022-03-15 PROCEDURE — 1123F ACP DISCUSS/DSCN MKR DOCD: CPT | Performed by: FAMILY MEDICINE

## 2022-03-15 PROCEDURE — 4040F PNEUMOC VAC/ADMIN/RCVD: CPT | Performed by: FAMILY MEDICINE

## 2022-03-15 SDOH — ECONOMIC STABILITY: FOOD INSECURITY: WITHIN THE PAST 12 MONTHS, THE FOOD YOU BOUGHT JUST DIDN'T LAST AND YOU DIDN'T HAVE MONEY TO GET MORE.: NEVER TRUE

## 2022-03-15 SDOH — ECONOMIC STABILITY: FOOD INSECURITY: WITHIN THE PAST 12 MONTHS, YOU WORRIED THAT YOUR FOOD WOULD RUN OUT BEFORE YOU GOT MONEY TO BUY MORE.: NEVER TRUE

## 2022-03-15 ASSESSMENT — SOCIAL DETERMINANTS OF HEALTH (SDOH): HOW HARD IS IT FOR YOU TO PAY FOR THE VERY BASICS LIKE FOOD, HOUSING, MEDICAL CARE, AND HEATING?: NOT HARD AT ALL

## 2022-03-15 ASSESSMENT — PATIENT HEALTH QUESTIONNAIRE - PHQ9
1. LITTLE INTEREST OR PLEASURE IN DOING THINGS: 0
SUM OF ALL RESPONSES TO PHQ QUESTIONS 1-9: 0
SUM OF ALL RESPONSES TO PHQ QUESTIONS 1-9: 0
SUM OF ALL RESPONSES TO PHQ9 QUESTIONS 1 & 2: 0
SUM OF ALL RESPONSES TO PHQ QUESTIONS 1-9: 0
2. FEELING DOWN, DEPRESSED OR HOPELESS: 0
SUM OF ALL RESPONSES TO PHQ QUESTIONS 1-9: 0

## 2022-03-15 ASSESSMENT — LIFESTYLE VARIABLES: HOW OFTEN DO YOU HAVE A DRINK CONTAINING ALCOHOL: NEVER

## 2022-03-15 NOTE — PROGRESS NOTES
Medicare Annual Wellness Visit    Ele Mayers is here for Medicare AWV    Assessment & Plan   Medicare annual wellness visit, subsequent      Recommendations for Preventive Services Due: see orders and patient instructions/AVS.  Recommended screening schedule for the next 5-10 years is provided to the patient in written form: see Patient Instructions/AVS.     No follow-ups on file. Subjective       Patient's complete Health Risk Assessment and screening values have been reviewed and are found in Flowsheets. The following problems were reviewed today and where indicated follow up appointments were made and/or referrals ordered.     Positive Risk Factor Screenings with Interventions:               General Health and ACP:  General  In general, how would you say your health is?: Very Good  In the past 7 days, have you experienced any of the following: New or Increased Pain, New or Increased Fatigue, Loneliness, Social Isolation, Stress or Anger?: No  Do you get the social and emotional support that you need?: Yes  Do you have a Living Will?: (!) No    Advance Directives     Power of  Living Will ACP-Advance Directive ACP-Power of     Not on File Not on File Not on File Not on File      General Health Risk Interventions:  · No Living Will: Patient declines ACP discussion/assistance     Hearing/Vision:  Do you or your family notice any trouble with your hearing that hasn't been managed with hearing aids?: No  Do you have difficulty driving, watching TV, or doing any of your daily activities because of your eyesight?: No  Have you had an eye exam within the past year?: (!) No  No exam data present            Objective      Patient-Reported Vitals  Patient-Reported Systolic (Top): 648 mmHg  Patient-Reported Diastolic (Bottom): 70 mmHg  Patient-Reported Pulse: 75  Patient-Reported Weight: 220 lbs              Allergies   Allergen Reactions    Ace Inhibitors     Cholestyramine Nausea Only    Hctz Dizziness    Lipitor     Pravachol [Pravastatin Sodium] Other (See Comments)     Headaches Fluttering    Zetia [Ezetimibe]     Zocor [Simvastatin]      Prior to Visit Medications    Medication Sig Taking? Authorizing Provider   traMADol (ULTRAM) 50 MG tablet TAKE 1 TABLET BY MOUTH EVERY 8 HOURS AS NEEDED FOR PAIN  Estrada Paulino MD   Evolocumab (REPATHA SURECLICK) 131 MG/ML SOAJ Inject 140 mg as directed every 14 days  Historical Provider, MD   gabapentin (NEURONTIN) 300 MG capsule Take 1 capsule by mouth 3 times daily for 180 days. Intended supply: 30 days  Estrada Paulino MD   clopidogrel (PLAVIX) 75 MG tablet Take 75 mg by mouth daily  Historical Provider, MD   montelukast (SINGULAIR) 10 MG tablet Take 1 tablet by mouth daily  Patient taking differently: Take 10 mg by mouth daily Indications: taking as needed   MARCIO Scales - CNP   potassium citrate (UROCIT-K 10) 10 MEQ (1080 MG) extended release tablet Take 1 tablet by mouth  Historical Provider, MD   metoprolol succinate (TOPROL XL) 50 MG extended release tablet Take 0.5 tablets by mouth daily  Estrada Paulino MD   MAGNESIUM PO Take by mouth  Historical Provider, MD   Cholecalciferol (VITAMIN D PO) Take  by mouth. Historical Provider, MD   allopurinol (ZYLOPRIM) 100 MG tablet Take 100 mg by mouth daily. Historical Provider, MD   FLAXSEED by Does not apply route. Historical Provider, MD   FIBER PO Take  by mouth. Historical Provider, MD   Omeprazole (PRILOSEC PO) Take  by mouth. Historical Provider, MD [address] by Does not apply route. Historical Provider, MD   aspirin 81 MG EC tablet Take 81 mg by mouth daily Taking every other day  Historical Provider, MD JACQUESFENESIN by Does not apply route.   Historical ProviderMD Delaney (Including outside providers/suppliers regularly involved in providing care):   Patient Care Team:  Estrada Paulino MD as PCP - General (Family Medicine)  Estrada Paulino MD as PCP - [facility] Empaneled Provider    Reviewed and updated this visit:  Tobacco  Allergies  Meds  Med Hx  Surg Hx  Soc Hx  Fam Hx           Leila Murray, was evaluated through a synchronous (real-time) telephone encounter. The patient (or guardian if applicable) is aware that this is a billable service. Verbal consent to proceed has been obtained within the past 12 months. The visit was conducted pursuant to the emergency declaration under the [address] [place] [address] and the [employer] and [employer] General Act. Patient identification was verified, and a caregiver was present when appropriate. The patient was located in a state where the provider was credentialed to provide care. --Xander Nguyen LPN on 5/87/3547 at 5:31 PM    An electronic signature was used to authenticate this note. Wally Ferrer LPN, 3/00/8183, performed the documented evaluation under the direct supervision of the attending physician.

## 2022-03-15 NOTE — PATIENT INSTRUCTIONS
Personalized Preventive Plan for Cass Cruz - 3/15/2022  Medicare offers a range of preventive health benefits. Some of the tests and screenings are paid in full while other may be subject to a deductible, co-insurance, and/or copay. Some of these benefits include a comprehensive review of your medical history including lifestyle, illnesses that may run in your family, and various assessments and screenings as appropriate. After reviewing your medical record and screening and assessments performed today your provider may have ordered immunizations, labs, imaging, and/or referrals for you. A list of these orders (if applicable) as well as your Preventive Care list are included within your After Visit Summary for your review. Other Preventive Recommendations:    · A preventive eye exam performed by an eye specialist is recommended every 1-2 years to screen for glaucoma; cataracts, macular degeneration, and other eye disorders. · A preventive dental visit is recommended every 6 months. · Try to get at least 150 minutes of exercise per week or 10,000 steps per day on a pedometer . · Order or download the FREE \"Exercise & Physical Activity: Your Everyday Guide\" from [employer]. Call 7-249.125.2100 or search [employer] online. · You need 8182-1714 mg of calcium and 7067-0797 IU of vitamin D per day. It is possible to meet your calcium requirement with diet alone, but a vitamin D supplement is usually necessary to meet this goal.  · When exposed to the sun, use a sunscreen that protects against both UVA and UVB radiation with an SPF of 30 or greater. Reapply every 2 to 3 hours or after sweating, drying off with a towel, or swimming. · Always wear a seat belt when traveling in a car. Always wear a helmet when riding a bicycle or motorcycle.

## 2022-04-01 ENCOUNTER — OFFICE VISIT (OUTPATIENT)
Dept: FAMILY MEDICINE CLINIC | Age: 72
End: 2022-04-01
Payer: MEDICARE

## 2022-04-01 VITALS
WEIGHT: 225 LBS | OXYGEN SATURATION: 94 % | SYSTOLIC BLOOD PRESSURE: 131 MMHG | TEMPERATURE: 97.7 F | HEART RATE: 54 BPM | BODY MASS INDEX: 28.5 KG/M2 | DIASTOLIC BLOOD PRESSURE: 76 MMHG

## 2022-04-01 DIAGNOSIS — E11.42 TYPE 2 DIABETES MELLITUS WITH DIABETIC POLYNEUROPATHY, WITHOUT LONG-TERM CURRENT USE OF INSULIN (HCC): ICD-10-CM

## 2022-04-01 DIAGNOSIS — N18.31 STAGE 3A CHRONIC KIDNEY DISEASE (HCC): ICD-10-CM

## 2022-04-01 DIAGNOSIS — Z12.11 COLON CANCER SCREENING: ICD-10-CM

## 2022-04-01 DIAGNOSIS — I10 ESSENTIAL HYPERTENSION: Primary | ICD-10-CM

## 2022-04-01 DIAGNOSIS — Z98.61 CAD S/P PERCUTANEOUS CORONARY ANGIOPLASTY: ICD-10-CM

## 2022-04-01 DIAGNOSIS — E78.2 MIXED HYPERLIPIDEMIA: ICD-10-CM

## 2022-04-01 DIAGNOSIS — I25.10 CAD S/P PERCUTANEOUS CORONARY ANGIOPLASTY: ICD-10-CM

## 2022-04-01 LAB
A/G RATIO: 1.6 (ref 1.1–2.2)
ALBUMIN SERPL-MCNC: 3.9 G/DL (ref 3.4–5)
ALP BLD-CCNC: 66 U/L (ref 40–129)
ALT SERPL-CCNC: 23 U/L (ref 10–40)
ANION GAP SERPL CALCULATED.3IONS-SCNC: 12 MMOL/L (ref 3–16)
AST SERPL-CCNC: 17 U/L (ref 15–37)
BILIRUB SERPL-MCNC: 0.5 MG/DL (ref 0–1)
BUN BLDV-MCNC: 22 MG/DL (ref 7–20)
CALCIUM SERPL-MCNC: 8.9 MG/DL (ref 8.3–10.6)
CHLORIDE BLD-SCNC: 105 MMOL/L (ref 99–110)
CHOLESTEROL, TOTAL: 112 MG/DL (ref 0–199)
CO2: 24 MMOL/L (ref 21–32)
CREAT SERPL-MCNC: 1 MG/DL (ref 0.8–1.3)
GFR AFRICAN AMERICAN: >60
GFR NON-AFRICAN AMERICAN: >60
GLUCOSE BLD-MCNC: 127 MG/DL (ref 70–99)
HDLC SERPL-MCNC: 40 MG/DL (ref 40–60)
LDL CHOLESTEROL CALCULATED: 41 MG/DL
POTASSIUM SERPL-SCNC: 4.6 MMOL/L (ref 3.5–5.1)
SODIUM BLD-SCNC: 141 MMOL/L (ref 136–145)
TOTAL PROTEIN: 6.4 G/DL (ref 6.4–8.2)
TRIGL SERPL-MCNC: 153 MG/DL (ref 0–150)
VLDLC SERPL CALC-MCNC: 31 MG/DL

## 2022-04-01 PROCEDURE — 2022F DILAT RTA XM EVC RTNOPTHY: CPT | Performed by: FAMILY MEDICINE

## 2022-04-01 PROCEDURE — 4040F PNEUMOC VAC/ADMIN/RCVD: CPT | Performed by: FAMILY MEDICINE

## 2022-04-01 PROCEDURE — 1036F TOBACCO NON-USER: CPT | Performed by: FAMILY MEDICINE

## 2022-04-01 PROCEDURE — G8427 DOCREV CUR MEDS BY ELIG CLIN: HCPCS | Performed by: FAMILY MEDICINE

## 2022-04-01 PROCEDURE — 3046F HEMOGLOBIN A1C LEVEL >9.0%: CPT | Performed by: FAMILY MEDICINE

## 2022-04-01 PROCEDURE — 36415 COLL VENOUS BLD VENIPUNCTURE: CPT | Performed by: FAMILY MEDICINE

## 2022-04-01 PROCEDURE — 1123F ACP DISCUSS/DSCN MKR DOCD: CPT | Performed by: FAMILY MEDICINE

## 2022-04-01 PROCEDURE — G8417 CALC BMI ABV UP PARAM F/U: HCPCS | Performed by: FAMILY MEDICINE

## 2022-04-01 PROCEDURE — 3017F COLORECTAL CA SCREEN DOC REV: CPT | Performed by: FAMILY MEDICINE

## 2022-04-01 PROCEDURE — 99214 OFFICE O/P EST MOD 30 MIN: CPT | Performed by: FAMILY MEDICINE

## 2022-04-01 RX ORDER — GABAPENTIN 300 MG/1
300 CAPSULE ORAL 3 TIMES DAILY
Qty: 270 CAPSULE | Refills: 1 | Status: SHIPPED | OUTPATIENT
Start: 2022-04-01 | End: 2022-09-29

## 2022-04-01 RX ORDER — METOPROLOL SUCCINATE 25 MG/1
25 TABLET, EXTENDED RELEASE ORAL DAILY
Qty: 30 TABLET | Refills: 3 | Status: SHIPPED | OUTPATIENT
Start: 2022-04-01 | End: 2022-05-05 | Stop reason: SDUPTHER

## 2022-04-01 NOTE — PROGRESS NOTES
He presents for follow up of his chronic health problems. He was started on new medication for his lipids by cardiology. Nilda Villavicencio worked well. No gout flare ups. On neurontin BID currently for his neuropathy but it is prescribed 3 times a day so he may increase it to that since it still bothersome. He no longer needs tramadol for his knee. Knee finally better after surgery. He does use tramadol still for his neuropathy or Tylenol rarely uses ibuprofen. His renal function is pretty much back to normal but he still goes to the kidney doctor. Tests and documents reviewed: Last note and labs     Objective:   /76   Pulse 54   Temp 97.7 °F (36.5 °C) (Oral)   Wt 225 lb (102.1 kg)   SpO2 94%   BMI 28.50 kg/m²   BP Readings from Last 3 Encounters:   04/01/22 131/76   09/21/21 (!) 104/57   11/09/20 113/73     Physical Exam  Vitals reviewed. Constitutional:       Appearance: He is well-developed. He is not toxic-appearing. HENT:      Head: Normocephalic. Neck:      Thyroid: No thyroid mass or thyromegaly. Cardiovascular:      Rate and Rhythm: Normal rate and regular rhythm. Heart sounds: Normal heart sounds. No murmur heard. Pulmonary:      Effort: No respiratory distress. Breath sounds: Normal breath sounds. No wheezing or rales. Chest:   Breasts:      Right: No supraclavicular adenopathy. Abdominal:      General: There is no distension. Palpations: Abdomen is soft. There is no mass. Tenderness: There is no abdominal tenderness. There is no guarding or rebound. Musculoskeletal:      Cervical back: Neck supple. Lymphadenopathy:      Cervical: No cervical adenopathy. Upper Body:      Right upper body: No supraclavicular adenopathy. Skin:     General: Skin is warm. Neurological:      Mental Status: He is alert and oriented to person, place, and time. Psychiatric:         Behavior: Behavior normal.         Thought Content:  Thought content normal. Judgment: Judgment normal.       Assessment and Plan:   Diagnosis Orders   1. Essential hypertension  Comprehensive Metabolic Panel    metoprolol succinate (TOPROL XL) 25 MG extended release tablet   2. Stage 3a chronic kidney disease ([address]     3. Type 2 diabetes mellitus with diabetic polyneuropathy, without long-term current use of insulin (HCC)  Hemoglobin A1C    gabapentin (NEURONTIN) 300 MG capsule   4. CAD S/P percutaneous coronary angioplasty     5. Mixed hyperlipidemia  Lipid Panel   6. Colon cancer screening  [facility]AND[facility] - Isaak Gil MD, Gastroenterology (ERCP & EUS), [facility]   Originally patient was taking a half of a 50 mg tablet of Toprol but he has 25 mg XL Toprol at home now he has been taking and blood pressures have been under good control. He is no longer on losartan after weight loss. Patient had colonoscopy 11 years ago so he is due for colon cancer screening. We will refer to GI  The problems listed in the assessment are stable unless otherwise indicated. He  was instructed to continue their current medications and treatment for the above problems unless otherwise indicated above. Age-specific preventative medicine recommendations were reviewed with patient today and the Healthy Family Handout was given to patient. Avoid tobacco products exposure. I have recommended that the patient follow CDC guidelines for prevention of COVID-19 infection. Follow up in 6mo. Call or return to office for any problems that develop before the next scheduled follow-up appointment. Maria Luz Alfaro M.D. Parts of this note were completed using voice recognition transcription. Every effort was made to ensure accuracy; however, inadvertent transcription errors may be present.

## 2022-04-01 NOTE — PATIENT INSTRUCTIONS
Please read the healthy family handout that you were given and share it with your family. Please compare this printed medication list with your medications at home to be sure they are the same. If you have any medications that are different please contact us immediately at [phone]. Also review your allergies that we have listed, these may also include medications that you have not been able to tolerate, make sure everything listed is correct. If you have any allergies that are different please contact us immediately at [phone].

## 2022-04-02 LAB
ESTIMATED AVERAGE GLUCOSE: 131.2 MG/DL
HBA1C MFR BLD: 6.2 %

## 2022-04-08 DIAGNOSIS — M54.41 CHRONIC MIDLINE LOW BACK PAIN WITH BILATERAL SCIATICA: ICD-10-CM

## 2022-04-08 DIAGNOSIS — G89.29 CHRONIC MIDLINE LOW BACK PAIN WITH BILATERAL SCIATICA: ICD-10-CM

## 2022-04-08 DIAGNOSIS — M54.42 CHRONIC MIDLINE LOW BACK PAIN WITH BILATERAL SCIATICA: ICD-10-CM

## 2022-04-08 RX ORDER — TRAMADOL HYDROCHLORIDE 50 MG/1
TABLET ORAL
Qty: 90 TABLET | Refills: 0 | Status: SHIPPED | OUTPATIENT
Start: 2022-04-08 | End: 2022-05-17

## 2022-04-08 NOTE — TELEPHONE ENCOUNTER
Date of last refill of this med was 1/25/22, # of pills given 90 and # of refills given 1. Their next appointment is 10/10/22, the last date patient was seen was 4/1/22. Does patient have medication agreement on file? No  Has drug screen been done in last 12 months if needed?  no

## 2022-04-22 ENCOUNTER — OFFICE VISIT (OUTPATIENT)
Dept: FAMILY MEDICINE CLINIC | Age: 72
End: 2022-04-22
Payer: MEDICARE

## 2022-04-22 VITALS
WEIGHT: 226 LBS | DIASTOLIC BLOOD PRESSURE: 72 MMHG | OXYGEN SATURATION: 95 % | SYSTOLIC BLOOD PRESSURE: 153 MMHG | HEART RATE: 60 BPM | TEMPERATURE: 98 F | BODY MASS INDEX: 28.63 KG/M2

## 2022-04-22 DIAGNOSIS — G56.22 ULNAR NEUROPATHY OF LEFT UPPER EXTREMITY: ICD-10-CM

## 2022-04-22 DIAGNOSIS — E11.42 TYPE 2 DIABETES MELLITUS WITH DIABETIC POLYNEUROPATHY, WITHOUT LONG-TERM CURRENT USE OF INSULIN (HCC): ICD-10-CM

## 2022-04-22 DIAGNOSIS — M70.21 OLECRANON BURSITIS OF RIGHT ELBOW: Primary | ICD-10-CM

## 2022-04-22 DIAGNOSIS — I25.10 CAD S/P PERCUTANEOUS CORONARY ANGIOPLASTY: ICD-10-CM

## 2022-04-22 DIAGNOSIS — I10 ESSENTIAL HYPERTENSION: ICD-10-CM

## 2022-04-22 DIAGNOSIS — Z98.61 CAD S/P PERCUTANEOUS CORONARY ANGIOPLASTY: ICD-10-CM

## 2022-04-22 PROCEDURE — G8427 DOCREV CUR MEDS BY ELIG CLIN: HCPCS | Performed by: FAMILY MEDICINE

## 2022-04-22 PROCEDURE — 4040F PNEUMOC VAC/ADMIN/RCVD: CPT | Performed by: FAMILY MEDICINE

## 2022-04-22 PROCEDURE — 93000 ELECTROCARDIOGRAM COMPLETE: CPT | Performed by: FAMILY MEDICINE

## 2022-04-22 PROCEDURE — 1123F ACP DISCUSS/DSCN MKR DOCD: CPT | Performed by: FAMILY MEDICINE

## 2022-04-22 PROCEDURE — 99214 OFFICE O/P EST MOD 30 MIN: CPT | Performed by: FAMILY MEDICINE

## 2022-04-22 PROCEDURE — 3017F COLORECTAL CA SCREEN DOC REV: CPT | Performed by: FAMILY MEDICINE

## 2022-04-22 PROCEDURE — 1036F TOBACCO NON-USER: CPT | Performed by: FAMILY MEDICINE

## 2022-04-22 PROCEDURE — G8417 CALC BMI ABV UP PARAM F/U: HCPCS | Performed by: FAMILY MEDICINE

## 2022-04-22 PROCEDURE — 2022F DILAT RTA XM EVC RTNOPTHY: CPT | Performed by: FAMILY MEDICINE

## 2022-04-22 PROCEDURE — 3044F HG A1C LEVEL LT 7.0%: CPT | Performed by: FAMILY MEDICINE

## 2022-04-22 NOTE — PROGRESS NOTES
2022    Ricardo Goodman (:  1950) is a 66 y.o. male, here for follow-up of his chronic health problems and a preoperative medicine evaluation before undergoing orthopedic elbow surgery. He has 2 issues going on he has what appears to be ulnar neuropathy on the left arm and he has a chronic right olecranon bursitis issue. He states it is actually gone down a little bit over the last couple days so he will check with the surgeon to make sure it still needs to be removed. He just had all his blood work and checkup done on  and he saw his cardiologist last month and everything checked out okay. He is scheduled for his surgery with [facility] on 2022. Patient received cardiac clearance. This letter was reviewed in the system. Most recent lab work reviewed as well today. Diabetes under good control per lab work. Patient Active Problem List   Diagnosis    GERD (gastroesophageal reflux disease)    Chronic eczema    Recurrent sinusitis    Tear of medial meniscus of knee    Gout    Benign non-nodular prostatic hyperplasia without lower urinary tract symptoms    CKD (chronic kidney disease)    Essential hypertension    Mixed hyperlipidemia    Idiopathic peripheral neuropathy    Chronic midline low back pain with bilateral sciatica    Type 2 diabetes mellitus with diabetic polyneuropathy, without long-term current use of insulin (HCC)    CAD S/P percutaneous coronary angioplasty    Microalbuminuria    Age-related nuclear cataract of both eyes    Ptosis of both eyelids    Localized osteoarthritis of right knee    Stage 3a chronic kidney disease (HCC)    Olecranon bursitis of right elbow    Ulnar neuropathy of left upper extremity       Review of Systems  Negative except for swelling of the right elbow and numbness in the ulnar nerve distribution left arm    Prior to Visit Medications    Medication Sig Taking?  Authorizing Provider   traMADol (ULTRAM) 50 MG tablet TAKE 1 TABLET BY MOUTH EVERY 8 HOURS AS NEEDED FOR PAIN Yes Merlinda Gosling, MD   metoprolol succinate (TOPROL XL) 25 MG extended release tablet Take 1 tablet by mouth daily Yes Paula Alonso MD   gabapentin (NEURONTIN) 300 MG capsule Take 1 capsule by mouth 3 times daily for 180 days. Intended supply: 30 days Yes Paula Alonso MD   Evolocumab (REPATHA SURECLICK) 362 MG/ML SOAJ Inject 140 mg as directed every 14 days Yes Historical Provider, MD   potassium citrate (UROCIT-K 10) 10 MEQ (1080 MG) extended release tablet Take 1 tablet by mouth Yes Historical Provider, MD   MAGNESIUM PO Take by mouth Yes Historical Provider, MD   Cholecalciferol (VITAMIN D PO) Take  by mouth. Yes Historical Provider, MD   allopurinol (ZYLOPRIM) 100 MG tablet Take 100 mg by mouth daily. Yes Historical Provider, MD   FLAXSEED by Does not apply route. Yes Historical Provider, MD   FIBER PO Take  by mouth. Yes Historical Provider, MD   Omeprazole (PRILOSEC PO) Take  by mouth. Yes Historical Provider, MD [place]  by Does not apply route. Yes Historical Provider, MD   aspirin 81 MG EC tablet Take 81 mg by mouth daily Taking every other day Yes Historical Provider, MD   GUAIFENESIN by Does not apply route.  Yes Historical Provider, MD        Allergies   Allergen Reactions    Ace Inhibitors     Cholestyramine Nausea Only    Hctz      Dizziness    Lipitor     Pravachol [Pravastatin Sodium] Other (See Comments)     Headaches Fluttering    Zetia [Ezetimibe]     Zocor [Simvastatin]        Past Medical History:   Diagnosis Date    BPH mild    no prostate cancer    Bursitis of elbow chronic    Chronic eczema     rt. leg    CKD (chronic kidney disease)     Dr Cally Jacome Coronary artery disease involving native coronary artery of native heart without angina pectoris     GERD (gastroesophageal reflux disease)     Gout     Heart palpitations     Dr Devaughn Starr    HTN     Hyperglycemia 01/01/2011    Hyperlipidemia     Inguinal hernia, left 01/01/2011    Noted on CAT scan    Nephrolithiasis     Rosacea     Sinusitis recurrent    Type 2 diabetes mellitus, without long-term current use of insulin [facility])        Past Surgical History:   Procedure Laterality Date    COLONOSCOPY  04/06/2011    CORONARY ANGIOPLASTY  2020    LITHOTRIPSY  01/01/2007       Social History     Socioeconomic History    Marital status:      Spouse name: Not on file    Number of children: Not on file    Years of education: Not on file    Highest education level: Not on file   Occupational History    Not on file   Tobacco Use    Smoking status: Never Smoker    Smokeless tobacco: Never Used   Substance and Sexual Activity    Alcohol use: No    Drug use: No    Sexual activity: Not on file   Other Topics Concern    Not on file   Social History Narrative    Not on file       ADVANCE DIRECTIVE: N, <no information>    Vitals:    04/22/22 0814   BP: (!) 153/72   Pulse: 60   Temp: 98 °F (36.7 °C)   TempSrc: Oral   SpO2: 95%   Weight: 226 lb (102.5 kg)     Estimated body mass index is 28.63 kg/m² as calculated from the following:    Height as of 3/12/20: 6' 2.5\" (1.892 m). Weight as of this encounter: 226 lb (102.5 kg). Physical Exam  Vitals reviewed. Constitutional:       General: He is not in acute distress. Appearance: He is well-developed. He is not toxic-appearing. Eyes:      Conjunctiva/sclera: Conjunctivae normal.      Pupils: Pupils are equal, round, and reactive to light. Neck:      Thyroid: No thyromegaly. Vascular: No carotid bruit or JVD. Trachea: Trachea normal.   Cardiovascular:      Rate and Rhythm: Normal rate and regular rhythm. Heart sounds: Normal heart sounds. Pulmonary:      Effort: Pulmonary effort is normal.      Breath sounds: Normal breath sounds. No wheezing or rales. Abdominal:      Palpations: Abdomen is soft. There is no hepatomegaly, splenomegaly or mass. Tenderness:  There is no abdominal tenderness. There is no guarding. Musculoskeletal:      Cervical back: Neck supple. Lymphadenopathy:      Cervical: No cervical adenopathy. Skin:     General: Skin is warm and dry. Neurological:      Mental Status: He is alert and oriented to person, place, and time. Psychiatric:         Behavior: Behavior normal. Behavior is cooperative. EKG today shows sinus bradycardia      Lab Results   Component Value Date    CHOL 112 04/01/2022    CHOL 115 09/21/2021    CHOL 233 11/20/2020    TRIG 153 04/01/2022    TRIG 185 09/21/2021    TRIG 188 11/20/2020    HDL 40 04/01/2022    HDL 43 09/21/2021    HDL 35 11/20/2020    HDL 41 10/21/2011    HDL 40 03/15/2011    HDL 37 09/20/2010    LDLCALC 41 04/01/2022    LDLCALC 35 09/21/2021    LDLCALC 139 09/30/2019    GLUCOSE 127 04/01/2022    LABA1C 6.2 04/01/2022    LABA1C 6.2 09/21/2021    LABA1C 6.2 11/09/2020         Immunization History   Administered Date(s) Administered    Influenza, Quadv, IM, (6 mo and older Fluzone, Flulaval, Fluarix and 3 yrs and older Afluria) 11/09/2020    Td, unspecified formulation 01/01/2003     Assessment & Plan   1. Olecranon bursitis of right elbow    2. Ulnar neuropathy of left upper extremity    3. Essential hypertension    4. Type 2 diabetes mellitus with diabetic polyneuropathy, without long-term current use of insulin ([facility] [address]    5. CAD S/P percutaneous coronary angioplasty      Orders Placed This Encounter   Procedures    EKG 12 Lead     My opinion is that the patient is currently medically stable. He was advised to avoid aspirin, NSAIDs, fish oil supplements, vitamin E supplements and blood thinners one week before planned procedure. This note will be sent to the surgeon. Rose Marie Peterson M.D. [address] [facility]. [address], [address] [facility]    No follow-ups on file.          --Rose Marie Peterson MD

## 2022-04-22 NOTE — PATIENT INSTRUCTIONS
Please read the healthy family handout that you were given and share it with your family. Please compare this printed medication list with your medications at home to be sure they are the same. If you have any medications that are different please contact us immediately at [phone]. Also review your allergies that we have listed, these may also include medications that you have not been able to tolerate, make sure everything listed is correct. If you have any allergies that are different please contact us immediately at [phone].

## 2022-05-03 DIAGNOSIS — Z01.818 PREOP EXAMINATION: Primary | ICD-10-CM

## 2022-05-04 ENCOUNTER — NURSE ONLY (OUTPATIENT)
Dept: FAMILY MEDICINE CLINIC | Age: 72
End: 2022-05-04
Payer: MEDICARE

## 2022-05-04 DIAGNOSIS — Z01.818 PREOP EXAMINATION: ICD-10-CM

## 2022-05-04 PROCEDURE — 36415 COLL VENOUS BLD VENIPUNCTURE: CPT | Performed by: FAMILY MEDICINE

## 2022-05-04 NOTE — PROGRESS NOTES
Blood drawn per physician order. 21 gauge needle used. Location left ac space w/o incident. Pressure applied until bleeding stopped. Bandaid applied. Patient instructed to call or return if problems with bleeding. 1 tubes drawn.

## 2022-05-05 ENCOUNTER — TELEPHONE (OUTPATIENT)
Dept: FAMILY MEDICINE CLINIC | Age: 72
End: 2022-05-05

## 2022-05-05 DIAGNOSIS — I10 ESSENTIAL HYPERTENSION: ICD-10-CM

## 2022-05-05 LAB
BASOPHILS ABSOLUTE: 0.1 K/UL (ref 0–0.2)
BASOPHILS RELATIVE PERCENT: 1.3 %
EOSINOPHILS ABSOLUTE: 0.3 K/UL (ref 0–0.6)
EOSINOPHILS RELATIVE PERCENT: 5.8 %
HCT VFR BLD CALC: 44.3 % (ref 40.5–52.5)
HEMOGLOBIN: 14.6 G/DL (ref 13.5–17.5)
LYMPHOCYTES ABSOLUTE: 1.3 K/UL (ref 1–5.1)
LYMPHOCYTES RELATIVE PERCENT: 27.8 %
MCH RBC QN AUTO: 29.6 PG (ref 26–34)
MCHC RBC AUTO-ENTMCNC: 32.9 G/DL (ref 31–36)
MCV RBC AUTO: 90 FL (ref 80–100)
MONOCYTES ABSOLUTE: 0.3 K/UL (ref 0–1.3)
MONOCYTES RELATIVE PERCENT: 6.8 %
NEUTROPHILS ABSOLUTE: 2.8 K/UL (ref 1.7–7.7)
NEUTROPHILS RELATIVE PERCENT: 58.3 %
PDW BLD-RTO: 14.3 % (ref 12.4–15.4)
PLATELET # BLD: 224 K/UL (ref 135–450)
PMV BLD AUTO: 8.1 FL (ref 5–10.5)
RBC # BLD: 4.92 M/UL (ref 4.2–5.9)
WBC # BLD: 4.9 K/UL (ref 4–11)

## 2022-05-05 RX ORDER — METOPROLOL SUCCINATE 50 MG/1
50 TABLET, EXTENDED RELEASE ORAL DAILY
Qty: 30 TABLET | Refills: 5 | Status: SHIPPED | OUTPATIENT
Start: 2022-05-05 | End: 2022-10-10 | Stop reason: SDUPTHER

## 2022-05-05 NOTE — TELEPHONE ENCOUNTER
Patient has been checking his blood pressure since his last appt and hit has been running higher than normal it ranges 140/85 to 155/95 today it 149/89 last night bottom number was over 100. He states that he used to be on Losartan and wonders if he should go back on this?

## 2022-05-09 ENCOUNTER — TELEPHONE (OUTPATIENT)
Dept: FAMILY MEDICINE CLINIC | Age: 72
End: 2022-05-09

## 2022-05-09 RX ORDER — LOSARTAN POTASSIUM 50 MG/1
50 TABLET ORAL DAILY
Qty: 30 TABLET | Refills: 3 | Status: SHIPPED | OUTPATIENT
Start: 2022-05-09 | End: 2022-09-12

## 2022-05-09 NOTE — TELEPHONE ENCOUNTER
Patient increased the Metoprolol to 50mg daily but his BP is still elevated Has been 175/92. He would like to restart the Losartan.  At what dose and what should the Metoprolol dosage be at?

## 2022-05-09 NOTE — TELEPHONE ENCOUNTER
Patient notified to restart Cozaar 50mg daily. RX sent to [facility].  Stay on current dose of  Metoloprol

## 2022-05-17 DIAGNOSIS — M54.41 CHRONIC MIDLINE LOW BACK PAIN WITH BILATERAL SCIATICA: ICD-10-CM

## 2022-05-17 DIAGNOSIS — M54.42 CHRONIC MIDLINE LOW BACK PAIN WITH BILATERAL SCIATICA: ICD-10-CM

## 2022-05-17 DIAGNOSIS — G89.29 CHRONIC MIDLINE LOW BACK PAIN WITH BILATERAL SCIATICA: ICD-10-CM

## 2022-05-17 RX ORDER — TRAMADOL HYDROCHLORIDE 50 MG/1
TABLET ORAL
Qty: 90 TABLET | Refills: 0 | Status: SHIPPED | OUTPATIENT
Start: 2022-05-17 | End: 2022-06-20

## 2022-05-17 NOTE — TELEPHONE ENCOUNTER
Date of last refill of this med was 4/8/22, # of pills given 90 and # of refills given 0. Their next appointment is 10/10/22, the last date patient was seen was 4/1/22. Does patient have medication agreement on file? No  Has drug screen been done in last 12 months if needed?  no

## 2022-05-17 NOTE — TELEPHONE ENCOUNTER
Controlled Substance Monitoring:    Acute and Chronic Pain Monitoring:   RX Monitoring 5/17/2022   Periodic Controlled Substance Monitoring No signs of potential drug abuse or diversion identified.

## 2022-06-20 DIAGNOSIS — G89.29 CHRONIC MIDLINE LOW BACK PAIN WITH BILATERAL SCIATICA: ICD-10-CM

## 2022-06-20 DIAGNOSIS — M54.41 CHRONIC MIDLINE LOW BACK PAIN WITH BILATERAL SCIATICA: ICD-10-CM

## 2022-06-20 DIAGNOSIS — M54.42 CHRONIC MIDLINE LOW BACK PAIN WITH BILATERAL SCIATICA: ICD-10-CM

## 2022-06-20 NOTE — TELEPHONE ENCOUNTER
Date of last refill of this med was 5/17, # of pills given 90 and # of refills given 0. Their next appointment is 10/10, the last date patient was seen was 4/22. Does patient have medication agreement on file? no  Has drug screen been done in last 12 months if needed?  no

## 2022-06-21 RX ORDER — TRAMADOL HYDROCHLORIDE 50 MG/1
TABLET ORAL
Qty: 90 TABLET | Refills: 2 | Status: SHIPPED | OUTPATIENT
Start: 2022-06-21 | End: 2022-09-20

## 2022-07-07 ENCOUNTER — TELEPHONE (OUTPATIENT)
Dept: FAMILY MEDICINE CLINIC | Age: 72
End: 2022-07-07

## 2022-07-07 DIAGNOSIS — U07.1 COVID-19: Primary | ICD-10-CM

## 2022-07-07 NOTE — TELEPHONE ENCOUNTER
Patient took home covid test yesterday and it was positive. He said symptoms started the night before. He has h/a body aches and congestion.   Wanted to see if you would prescribe medication for him to   [address] [place] [address]

## 2022-07-07 NOTE — TELEPHONE ENCOUNTER
Lottie Posadas   Could you review, I sent to Dr Loreto Weathers this morning but he never reviewed it

## 2022-07-08 ENCOUNTER — TELEPHONE (OUTPATIENT)
Dept: FAMILY MEDICINE CLINIC | Age: 72
End: 2022-07-08

## 2022-07-08 DIAGNOSIS — U07.1 COVID-19: ICD-10-CM

## 2022-07-08 NOTE — TELEPHONE ENCOUNTER
[address] called and they are not licensed to dispense the Paxlovid. I resent it to [facility].

## 2022-07-08 NOTE — TELEPHONE ENCOUNTER
[facility] called and they do not have the [address] in stock. They do have the Molnupiravir in stock DO you want to switch him or [facility] in [place] has the Paxlovoid in a 300mg ?

## 2022-09-12 RX ORDER — LOSARTAN POTASSIUM 50 MG/1
TABLET ORAL
Qty: 30 TABLET | Refills: 5 | Status: SHIPPED | OUTPATIENT
Start: 2022-09-12

## 2022-09-20 DIAGNOSIS — M54.42 CHRONIC MIDLINE LOW BACK PAIN WITH BILATERAL SCIATICA: ICD-10-CM

## 2022-09-20 DIAGNOSIS — M54.41 CHRONIC MIDLINE LOW BACK PAIN WITH BILATERAL SCIATICA: ICD-10-CM

## 2022-09-20 DIAGNOSIS — G89.29 CHRONIC MIDLINE LOW BACK PAIN WITH BILATERAL SCIATICA: ICD-10-CM

## 2022-09-20 RX ORDER — TRAMADOL HYDROCHLORIDE 50 MG/1
TABLET ORAL
Qty: 90 TABLET | Refills: 0 | Status: SHIPPED | OUTPATIENT
Start: 2022-09-20 | End: 2022-10-10 | Stop reason: SDUPTHER

## 2022-09-20 NOTE — TELEPHONE ENCOUNTER
Future appt scheduled 10/10/2022                     Last appt 04/01/2022      Last Written 06/21/2022    traMADol (ULTRAM) 50 MG tablet  #90  2 RF

## 2022-09-28 DIAGNOSIS — E11.42 TYPE 2 DIABETES MELLITUS WITH DIABETIC POLYNEUROPATHY, WITHOUT LONG-TERM CURRENT USE OF INSULIN (HCC): ICD-10-CM

## 2022-09-29 RX ORDER — GABAPENTIN 300 MG/1
CAPSULE ORAL
Qty: 270 CAPSULE | Refills: 1 | Status: SHIPPED | OUTPATIENT
Start: 2022-09-29 | End: 2022-10-29

## 2022-10-10 ENCOUNTER — OFFICE VISIT (OUTPATIENT)
Dept: FAMILY MEDICINE CLINIC | Age: 72
End: 2022-10-10
Payer: MEDICARE

## 2022-10-10 VITALS
TEMPERATURE: 97.8 F | DIASTOLIC BLOOD PRESSURE: 72 MMHG | WEIGHT: 230 LBS | SYSTOLIC BLOOD PRESSURE: 122 MMHG | BODY MASS INDEX: 29.14 KG/M2 | HEART RATE: 67 BPM | OXYGEN SATURATION: 98 %

## 2022-10-10 DIAGNOSIS — I25.10 CAD S/P PERCUTANEOUS CORONARY ANGIOPLASTY: ICD-10-CM

## 2022-10-10 DIAGNOSIS — G89.29 CHRONIC MIDLINE LOW BACK PAIN WITH BILATERAL SCIATICA: ICD-10-CM

## 2022-10-10 DIAGNOSIS — E11.42 TYPE 2 DIABETES MELLITUS WITH DIABETIC POLYNEUROPATHY, WITHOUT LONG-TERM CURRENT USE OF INSULIN (HCC): Primary | ICD-10-CM

## 2022-10-10 DIAGNOSIS — E78.2 MIXED HYPERLIPIDEMIA: ICD-10-CM

## 2022-10-10 DIAGNOSIS — I10 ESSENTIAL HYPERTENSION: ICD-10-CM

## 2022-10-10 DIAGNOSIS — Z98.61 CAD S/P PERCUTANEOUS CORONARY ANGIOPLASTY: ICD-10-CM

## 2022-10-10 DIAGNOSIS — Z12.5 SCREENING PSA (PROSTATE SPECIFIC ANTIGEN): ICD-10-CM

## 2022-10-10 DIAGNOSIS — N18.31 STAGE 3A CHRONIC KIDNEY DISEASE (HCC): ICD-10-CM

## 2022-10-10 DIAGNOSIS — M54.42 CHRONIC MIDLINE LOW BACK PAIN WITH BILATERAL SCIATICA: ICD-10-CM

## 2022-10-10 DIAGNOSIS — M54.41 CHRONIC MIDLINE LOW BACK PAIN WITH BILATERAL SCIATICA: ICD-10-CM

## 2022-10-10 LAB
A/G RATIO: 1.4 (ref 1.1–2.2)
ALBUMIN SERPL-MCNC: 3.8 G/DL (ref 3.4–5)
ALP BLD-CCNC: 71 U/L (ref 40–129)
ALT SERPL-CCNC: 26 U/L (ref 10–40)
ANION GAP SERPL CALCULATED.3IONS-SCNC: 10 MMOL/L (ref 3–16)
AST SERPL-CCNC: 18 U/L (ref 15–37)
BILIRUB SERPL-MCNC: 0.7 MG/DL (ref 0–1)
BUN BLDV-MCNC: 28 MG/DL (ref 7–20)
CALCIUM SERPL-MCNC: 9.3 MG/DL (ref 8.3–10.6)
CHLORIDE BLD-SCNC: 101 MMOL/L (ref 99–110)
CHOLESTEROL, TOTAL: 102 MG/DL (ref 0–199)
CO2: 28 MMOL/L (ref 21–32)
CREAT SERPL-MCNC: 1.2 MG/DL (ref 0.8–1.3)
GFR AFRICAN AMERICAN: >60
GFR NON-AFRICAN AMERICAN: 59
GLUCOSE BLD-MCNC: 126 MG/DL (ref 70–99)
HCT VFR BLD CALC: 37.6 % (ref 40.5–52.5)
HDLC SERPL-MCNC: 36 MG/DL (ref 40–60)
HEMOGLOBIN: 13.2 G/DL (ref 13.5–17.5)
LDL CHOLESTEROL CALCULATED: 28 MG/DL
MCH RBC QN AUTO: 31.1 PG (ref 26–34)
MCHC RBC AUTO-ENTMCNC: 35.1 G/DL (ref 31–36)
MCV RBC AUTO: 88.5 FL (ref 80–100)
PDW BLD-RTO: 13 % (ref 12.4–15.4)
PLATELET # BLD: 219 K/UL (ref 135–450)
PMV BLD AUTO: 8.3 FL (ref 5–10.5)
POTASSIUM SERPL-SCNC: 4.9 MMOL/L (ref 3.5–5.1)
PROSTATE SPECIFIC ANTIGEN: 0.62 NG/ML (ref 0–4)
RBC # BLD: 4.25 M/UL (ref 4.2–5.9)
SODIUM BLD-SCNC: 139 MMOL/L (ref 136–145)
TOTAL PROTEIN: 6.6 G/DL (ref 6.4–8.2)
TRIGL SERPL-MCNC: 192 MG/DL (ref 0–150)
VLDLC SERPL CALC-MCNC: 38 MG/DL
WBC # BLD: 5.5 K/UL (ref 4–11)

## 2022-10-10 PROCEDURE — 1036F TOBACCO NON-USER: CPT | Performed by: FAMILY MEDICINE

## 2022-10-10 PROCEDURE — 1123F ACP DISCUSS/DSCN MKR DOCD: CPT | Performed by: FAMILY MEDICINE

## 2022-10-10 PROCEDURE — G8417 CALC BMI ABV UP PARAM F/U: HCPCS | Performed by: FAMILY MEDICINE

## 2022-10-10 PROCEDURE — 2022F DILAT RTA XM EVC RTNOPTHY: CPT | Performed by: FAMILY MEDICINE

## 2022-10-10 PROCEDURE — G8427 DOCREV CUR MEDS BY ELIG CLIN: HCPCS | Performed by: FAMILY MEDICINE

## 2022-10-10 PROCEDURE — 3017F COLORECTAL CA SCREEN DOC REV: CPT | Performed by: FAMILY MEDICINE

## 2022-10-10 PROCEDURE — 99214 OFFICE O/P EST MOD 30 MIN: CPT | Performed by: FAMILY MEDICINE

## 2022-10-10 PROCEDURE — 3044F HG A1C LEVEL LT 7.0%: CPT | Performed by: FAMILY MEDICINE

## 2022-10-10 PROCEDURE — G8484 FLU IMMUNIZE NO ADMIN: HCPCS | Performed by: FAMILY MEDICINE

## 2022-10-10 PROCEDURE — 36415 COLL VENOUS BLD VENIPUNCTURE: CPT | Performed by: FAMILY MEDICINE

## 2022-10-10 RX ORDER — TRAMADOL HYDROCHLORIDE 50 MG/1
50 TABLET ORAL EVERY 8 HOURS PRN
Qty: 90 TABLET | Refills: 2 | Status: SHIPPED | OUTPATIENT
Start: 2022-10-10 | End: 2022-11-09

## 2022-10-10 RX ORDER — METOPROLOL SUCCINATE 50 MG/1
50 TABLET, EXTENDED RELEASE ORAL DAILY
Qty: 30 TABLET | Refills: 5 | Status: SHIPPED | OUTPATIENT
Start: 2022-10-10

## 2022-10-10 NOTE — PATIENT INSTRUCTIONS
Please read the healthy family handout that you were given and share it with your family. Please compare this printed medication list with your medications at home to be sure they are the same. If you have any medications that are different please contact us immediately at [phone]. Also review your allergies that we have listed, these may also include medications that you have not been able to tolerate, make sure everything listed is correct. If you have any allergies that are different please contact us immediately at [phone]. You may receive a survey in the mail or by email asking about your experience during your visit today. Please complete and return to us so we know how we are serving you.

## 2022-10-10 NOTE — PROGRESS NOTES
He presents today for follow-up on diabetes high blood pressure and other health problems he has been going to a chiropractor for his back and neuropathy and they are trying infrared treatments on him and new vitamin supplements to see if it helps his neuropathy pain he still takes gabapentin and tramadol and occasional over-the-counter Tylenol or ibuprofen. He is due for blood work today. Overall he feels fine besides the neuropathy pain. Tests and documents reviewed today: Last note and lab work     Objective:   /72   Pulse 67   Temp 97.8 °F (36.6 °C) (Oral)   Wt 230 lb (104.3 kg)   SpO2 98%   BMI 29.14 kg/m²   BP Readings from Last 3 Encounters:   10/10/22 122/72   04/22/22 (!) 153/72   04/01/22 131/76     Physical Exam  Vitals reviewed. Constitutional:       Appearance: He is well-developed. He is not toxic-appearing. HENT:      Head: Normocephalic. Neck:      Thyroid: No thyroid mass or thyromegaly. Cardiovascular:      Rate and Rhythm: Normal rate and regular rhythm. Heart sounds: Normal heart sounds. No murmur heard. Pulmonary:      Effort: No respiratory distress. Breath sounds: Normal breath sounds. No wheezing or rales. Abdominal:      General: There is no distension. Palpations: Abdomen is soft. There is no mass. Tenderness: There is no abdominal tenderness. There is no guarding or rebound. Musculoskeletal:      Cervical back: Neck supple. Lymphadenopathy:      Cervical: No cervical adenopathy. Upper Body:      Right upper body: No supraclavicular adenopathy. Skin:     General: Skin is warm. Neurological:      Mental Status: He is alert and oriented to person, place, and time. Psychiatric:         Behavior: Behavior normal.         Thought Content: Thought content normal.         Judgment: Judgment normal.     Assessment and Plan:   Diagnosis Orders   1.  Type 2 diabetes mellitus with diabetic polyneuropathy, without long-term current use of insulin ([facility])  Hemoglobin A1C      2. Essential hypertension  metoprolol succinate (TOPROL XL) 50 MG extended release tablet      3. Chronic midline low back pain with bilateral sciatica  traMADol (ULTRAM) 50 MG tablet      4. Stage 3a chronic kidney disease (HCC)  Comprehensive Metabolic Panel    CBC      5. Mixed hyperlipidemia  Lipid Panel      6. CAD S/P percutaneous coronary angioplasty        7. Screening PSA (prostate specific antigen)  PSA Screening      Blood pressure under control so we will continue current dose of medication his last hemoglobin A1c was good showing controlled diabetes but we will recheck labs today. Continue to follow-up with chiropractor for his back and neuropathy issues. He declined flu shot  The problems listed in the assessment are stable unless otherwise indicated. Prescription drug management completed today. He  was instructed to continue their current medications and treatment for the above problems unless otherwise indicated above. Age-specific preventative medicine recommendations were reviewed with patient today. Follow up in 6 months. Call or return to office for any problems that develop before the next scheduled follow-up appointment. Shalini Josue M.D. Parts of this note were completed using voice recognition transcription. Every effort was made to ensure accuracy; however, inadvertent transcription errors may be present.

## 2022-10-11 LAB
ESTIMATED AVERAGE GLUCOSE: 134.1 MG/DL
HBA1C MFR BLD: 6.3 %

## 2022-11-22 ENCOUNTER — TELEPHONE (OUTPATIENT)
Dept: FAMILY MEDICINE CLINIC | Age: 72
End: 2022-11-22

## 2022-11-22 DIAGNOSIS — J32.9 RECURRENT SINUSITIS: Primary | ICD-10-CM

## 2022-11-22 RX ORDER — AZITHROMYCIN 250 MG/1
TABLET, FILM COATED ORAL
Qty: 6 TABLET | Refills: 0 | Status: SHIPPED | OUTPATIENT
Start: 2022-11-22

## 2022-11-22 NOTE — TELEPHONE ENCOUNTER
Patient wanted to see if you would prescribe zpak for him to have at home.   His wife has been sick with sinus infection and he is worried that he will get it also so wanted to have zpak on hand in case he needed it over the holidays   [address] [place] [address]

## 2022-12-05 ENCOUNTER — OFFICE VISIT (OUTPATIENT)
Dept: FAMILY MEDICINE CLINIC | Age: 72
End: 2022-12-05
Payer: MEDICARE

## 2022-12-05 VITALS
TEMPERATURE: 97.7 F | DIASTOLIC BLOOD PRESSURE: 54 MMHG | BODY MASS INDEX: 29.01 KG/M2 | WEIGHT: 229 LBS | HEART RATE: 62 BPM | OXYGEN SATURATION: 93 % | SYSTOLIC BLOOD PRESSURE: 89 MMHG

## 2022-12-05 DIAGNOSIS — Z98.61 CAD S/P PERCUTANEOUS CORONARY ANGIOPLASTY: ICD-10-CM

## 2022-12-05 DIAGNOSIS — I25.10 CAD S/P PERCUTANEOUS CORONARY ANGIOPLASTY: ICD-10-CM

## 2022-12-05 DIAGNOSIS — I10 ESSENTIAL HYPERTENSION: Primary | ICD-10-CM

## 2022-12-05 DIAGNOSIS — G56.02 CARPAL TUNNEL SYNDROME OF LEFT WRIST: ICD-10-CM

## 2022-12-05 DIAGNOSIS — G56.22 CUBITAL TUNNEL SYNDROME ON LEFT: ICD-10-CM

## 2022-12-05 DIAGNOSIS — E11.42 TYPE 2 DIABETES MELLITUS WITH DIABETIC POLYNEUROPATHY, WITHOUT LONG-TERM CURRENT USE OF INSULIN (HCC): ICD-10-CM

## 2022-12-05 DIAGNOSIS — N18.31 STAGE 3A CHRONIC KIDNEY DISEASE (HCC): ICD-10-CM

## 2022-12-05 PROCEDURE — 2022F DILAT RTA XM EVC RTNOPTHY: CPT | Performed by: FAMILY MEDICINE

## 2022-12-05 PROCEDURE — G8417 CALC BMI ABV UP PARAM F/U: HCPCS | Performed by: FAMILY MEDICINE

## 2022-12-05 PROCEDURE — 1123F ACP DISCUSS/DSCN MKR DOCD: CPT | Performed by: FAMILY MEDICINE

## 2022-12-05 PROCEDURE — 3017F COLORECTAL CA SCREEN DOC REV: CPT | Performed by: FAMILY MEDICINE

## 2022-12-05 PROCEDURE — G8484 FLU IMMUNIZE NO ADMIN: HCPCS | Performed by: FAMILY MEDICINE

## 2022-12-05 PROCEDURE — 1036F TOBACCO NON-USER: CPT | Performed by: FAMILY MEDICINE

## 2022-12-05 PROCEDURE — 36415 COLL VENOUS BLD VENIPUNCTURE: CPT | Performed by: FAMILY MEDICINE

## 2022-12-05 PROCEDURE — 3044F HG A1C LEVEL LT 7.0%: CPT | Performed by: FAMILY MEDICINE

## 2022-12-05 PROCEDURE — 3078F DIAST BP <80 MM HG: CPT | Performed by: FAMILY MEDICINE

## 2022-12-05 PROCEDURE — 99214 OFFICE O/P EST MOD 30 MIN: CPT | Performed by: FAMILY MEDICINE

## 2022-12-05 PROCEDURE — 93000 ELECTROCARDIOGRAM COMPLETE: CPT | Performed by: FAMILY MEDICINE

## 2022-12-05 PROCEDURE — 3074F SYST BP LT 130 MM HG: CPT | Performed by: FAMILY MEDICINE

## 2022-12-05 PROCEDURE — G8427 DOCREV CUR MEDS BY ELIG CLIN: HCPCS | Performed by: FAMILY MEDICINE

## 2022-12-05 NOTE — PATIENT INSTRUCTIONS
Please read the healthy family handout that you were given and share it with your family. Please compare this printed medication list with your medications at home to be sure they are the same. If you have any medications that are different please contact us immediately at [phone]. Also review your allergies that we have listed, these may also include medications that you have not been able to tolerate, make sure everything listed is correct. If you have any allergies that are different please contact us immediately at [phone]. You may receive a survey in the mail or by email asking about your experience during your visit today. Please complete and return to us so we know how we are serving you.

## 2022-12-06 LAB
ANION GAP SERPL CALCULATED.3IONS-SCNC: 11 MMOL/L (ref 3–16)
BUN BLDV-MCNC: 29 MG/DL (ref 7–20)
CALCIUM SERPL-MCNC: 9.3 MG/DL (ref 8.3–10.6)
CHLORIDE BLD-SCNC: 101 MMOL/L (ref 99–110)
CO2: 27 MMOL/L (ref 21–32)
CREAT SERPL-MCNC: 1.2 MG/DL (ref 0.8–1.3)
GFR SERPL CREATININE-BSD FRML MDRD: >60 ML/MIN/{1.73_M2}
GLUCOSE BLD-MCNC: 109 MG/DL (ref 70–99)
HCT VFR BLD CALC: 40.4 % (ref 40.5–52.5)
HEMOGLOBIN: 13.8 G/DL (ref 13.5–17.5)
MCH RBC QN AUTO: 30.8 PG (ref 26–34)
MCHC RBC AUTO-ENTMCNC: 34.3 G/DL (ref 31–36)
MCV RBC AUTO: 89.8 FL (ref 80–100)
PDW BLD-RTO: 12.7 % (ref 12.4–15.4)
PLATELET # BLD: 268 K/UL (ref 135–450)
PMV BLD AUTO: 8.9 FL (ref 5–10.5)
POTASSIUM SERPL-SCNC: 4.8 MMOL/L (ref 3.5–5.1)
RBC # BLD: 4.49 M/UL (ref 4.2–5.9)
SODIUM BLD-SCNC: 139 MMOL/L (ref 136–145)
WBC # BLD: 7.6 K/UL (ref 4–11)

## 2022-12-08 ENCOUNTER — TELEPHONE (OUTPATIENT)
Dept: FAMILY MEDICINE CLINIC | Age: 72
End: 2022-12-08

## 2022-12-08 NOTE — TELEPHONE ENCOUNTER
The patient will continue the water and checking his BP. He is now taking losartan 25mg and his BP is 100/60 and 118/64.  He will call back on Monday and let us know how it is doing rc

## 2023-01-16 DIAGNOSIS — E11.42 TYPE 2 DIABETES MELLITUS WITH DIABETIC POLYNEUROPATHY, WITHOUT LONG-TERM CURRENT USE OF INSULIN (HCC): ICD-10-CM

## 2023-01-16 RX ORDER — GABAPENTIN 300 MG/1
CAPSULE ORAL
Qty: 270 CAPSULE | Refills: 1 | Status: SHIPPED | OUTPATIENT
Start: 2023-01-16 | End: 2023-07-31

## 2023-02-07 DIAGNOSIS — M54.41 CHRONIC MIDLINE LOW BACK PAIN WITH BILATERAL SCIATICA: ICD-10-CM

## 2023-02-07 DIAGNOSIS — M54.42 CHRONIC MIDLINE LOW BACK PAIN WITH BILATERAL SCIATICA: ICD-10-CM

## 2023-02-07 DIAGNOSIS — G89.29 CHRONIC MIDLINE LOW BACK PAIN WITH BILATERAL SCIATICA: ICD-10-CM

## 2023-02-07 RX ORDER — TRAMADOL HYDROCHLORIDE 50 MG/1
50 TABLET ORAL EVERY 8 HOURS PRN
Qty: 90 TABLET | Refills: 1 | Status: SHIPPED | OUTPATIENT
Start: 2023-02-07 | End: 2023-03-09

## 2023-02-07 NOTE — TELEPHONE ENCOUNTER
Date of last refill of this med was 10/10, # of pills given 90 and # of refills given 2. Their next appointment is 4/10, the last date patient was seen was 12/5. Does patient have medication agreement on file? No  Has drug screen been done in last 12 months if needed?  no

## 2023-05-18 DIAGNOSIS — I10 ESSENTIAL HYPERTENSION: ICD-10-CM

## 2023-05-19 RX ORDER — METOPROLOL SUCCINATE 50 MG/1
50 TABLET, EXTENDED RELEASE ORAL DAILY
Qty: 90 TABLET | Refills: 1 | Status: SHIPPED | OUTPATIENT
Start: 2023-05-19 | End: 2023-08-17

## 2023-06-23 ENCOUNTER — TELEMEDICINE (OUTPATIENT)
Dept: FAMILY MEDICINE CLINIC | Age: 73
End: 2023-06-23

## 2023-06-23 DIAGNOSIS — Z00.00 MEDICARE ANNUAL WELLNESS VISIT, SUBSEQUENT: Primary | ICD-10-CM

## 2023-06-23 SDOH — ECONOMIC STABILITY: INCOME INSECURITY: HOW HARD IS IT FOR YOU TO PAY FOR THE VERY BASICS LIKE FOOD, HOUSING, MEDICAL CARE, AND HEATING?: NOT HARD AT ALL

## 2023-06-23 SDOH — ECONOMIC STABILITY: HOUSING INSECURITY
IN THE LAST 12 MONTHS, WAS THERE A TIME WHEN YOU DID NOT HAVE A STEADY PLACE TO SLEEP OR SLEPT IN A SHELTER (INCLUDING NOW)?: NO

## 2023-06-23 SDOH — ECONOMIC STABILITY: FOOD INSECURITY: WITHIN THE PAST 12 MONTHS, YOU WORRIED THAT YOUR FOOD WOULD RUN OUT BEFORE YOU GOT MONEY TO BUY MORE.: NEVER TRUE

## 2023-06-23 SDOH — ECONOMIC STABILITY: FOOD INSECURITY: WITHIN THE PAST 12 MONTHS, THE FOOD YOU BOUGHT JUST DIDN'T LAST AND YOU DIDN'T HAVE MONEY TO GET MORE.: NEVER TRUE

## 2023-06-23 ASSESSMENT — PATIENT HEALTH QUESTIONNAIRE - PHQ9
SUM OF ALL RESPONSES TO PHQ QUESTIONS 1-9: 0
SUM OF ALL RESPONSES TO PHQ QUESTIONS 1-9: 0
2. FEELING DOWN, DEPRESSED OR HOPELESS: 0
SUM OF ALL RESPONSES TO PHQ QUESTIONS 1-9: 0
SUM OF ALL RESPONSES TO PHQ9 QUESTIONS 1 & 2: 0
SUM OF ALL RESPONSES TO PHQ QUESTIONS 1-9: 0
1. LITTLE INTEREST OR PLEASURE IN DOING THINGS: 0

## 2023-06-23 ASSESSMENT — LIFESTYLE VARIABLES
HOW MANY STANDARD DRINKS CONTAINING ALCOHOL DO YOU HAVE ON A TYPICAL DAY: PATIENT DOES NOT DRINK
HOW OFTEN DO YOU HAVE A DRINK CONTAINING ALCOHOL: NEVER

## 2023-06-23 NOTE — PATIENT INSTRUCTIONS
Personalized Preventive Plan for Kaity Raymundo - 6/23/2023  Medicare offers a range of preventive health benefits. Some of the tests and screenings are paid in full while other may be subject to a deductible, co-insurance, and/or copay. Some of these benefits include a comprehensive review of your medical history including lifestyle, illnesses that may run in your family, and various assessments and screenings as appropriate. After reviewing your medical record and screening and assessments performed today your provider may have ordered immunizations, labs, imaging, and/or referrals for you. A list of these orders (if applicable) as well as your Preventive Care list are included within your After Visit Summary for your review. Other Preventive Recommendations:    A preventive eye exam performed by an eye specialist is recommended every 1-2 years to screen for glaucoma; cataracts, macular degeneration, and other eye disorders. A preventive dental visit is recommended every 6 months. Try to get at least 150 minutes of exercise per week or 10,000 steps per day on a pedometer . Order or download the FREE \"Exercise & Physical Activity: Your Everyday Guide\" from [employer]. Call 0-990.909.3943 or search [employer] online. You need 2237-8854 mg of calcium and 5994-9005 IU of vitamin D per day. It is possible to meet your calcium requirement with diet alone, but a vitamin D supplement is usually necessary to meet this goal.  When exposed to the sun, use a sunscreen that protects against both UVA and UVB radiation with an SPF of 30 or greater. Reapply every 2 to 3 hours or after sweating, drying off with a towel, or swimming. Always wear a seat belt when traveling in a car. Always wear a helmet when riding a bicycle or motorcycle.

## 2023-06-23 NOTE — PROGRESS NOTES
Medicare Annual Wellness Visit    Donavan Franco is here for Medicare AWV    Assessment & Plan   Medicare annual wellness visit, subsequent  Recommendations for Preventive Services Due: see orders and patient instructions/AVS.  Recommended screening schedule for the next 5-10 years is provided to the patient in written form: see Patient Instructions/AVS.     No follow-ups on file. Subjective     Patient's complete Health Risk Assessment and screening values have been reviewed and are found in Flowsheets. The following problems were reviewed today and where indicated follow up appointments were made and/or referrals ordered. Positive Risk Factor Screenings with Interventions:               General HRA Questions:  Select all that apply: (!) New or Increased Pain (Set in a chair that broke, he fell and hit his bottom.)   Weight and Activity:  Physical Activity: Inactive    Days of Exercise per Week: 0 days    Minutes of Exercise per Session: 0 min     On average, how many days per week do you engage in moderate to strenuous exercise (like a brisk walk)?: 0 days  Have you lost any weight without trying in the past 3 months?: No  There is no height or weight on file to calculate BMI. Objective      Patient-Reported Vitals  Patient-Reported Systolic (Top): 717 mmHg  Patient-Reported Diastolic (Bottom): 60 mmHg  Patient-Reported Pulse: 60  Patient-Reported Weight: 225 lbs       Allergies   Allergen Reactions    Ace Inhibitors     Cholestyramine Nausea Only    Hctz      Dizziness    Lipitor     Pravachol [Pravastatin Sodium] Other (See Comments)     Headaches Fluttering    Zetia [Ezetimibe]     Zocor [Simvastatin]      Prior to Visit Medications    Medication Sig Taking?  Authorizing Provider   metoprolol succinate (TOPROL XL) 50 MG extended release tablet Take 1 tablet by mouth daily  Naga Banuelos MD   acetaminophen (TYLENOL) 500 MG tablet Take 2 tablets by mouth 3 times daily  Historical

## 2023-08-12 DIAGNOSIS — M54.42 CHRONIC MIDLINE LOW BACK PAIN WITH BILATERAL SCIATICA: ICD-10-CM

## 2023-08-12 DIAGNOSIS — M54.41 CHRONIC MIDLINE LOW BACK PAIN WITH BILATERAL SCIATICA: ICD-10-CM

## 2023-08-12 DIAGNOSIS — G89.29 CHRONIC MIDLINE LOW BACK PAIN WITH BILATERAL SCIATICA: ICD-10-CM

## 2023-08-14 RX ORDER — TRAMADOL HYDROCHLORIDE 50 MG/1
TABLET ORAL
Qty: 120 TABLET | Refills: 0 | Status: SHIPPED | OUTPATIENT
Start: 2023-08-14 | End: 2023-09-13

## 2023-08-14 NOTE — TELEPHONE ENCOUNTER
Date of last refill of this med was 4/10, # of pills given 120 and # of refills given 2. Their next appointment is 8/15, the last date patient was seen was 4/10. Does patient have medication agreement on file? No  Has drug screen been done in last 12 months if needed?  no

## 2023-08-15 ENCOUNTER — OFFICE VISIT (OUTPATIENT)
Dept: FAMILY MEDICINE CLINIC | Age: 73
End: 2023-08-15

## 2023-08-15 VITALS
DIASTOLIC BLOOD PRESSURE: 67 MMHG | OXYGEN SATURATION: 97 % | HEART RATE: 48 BPM | SYSTOLIC BLOOD PRESSURE: 111 MMHG | TEMPERATURE: 97.7 F | BODY MASS INDEX: 29.26 KG/M2 | WEIGHT: 231 LBS

## 2023-08-15 DIAGNOSIS — I10 ESSENTIAL HYPERTENSION: Primary | ICD-10-CM

## 2023-08-15 DIAGNOSIS — Z98.61 CAD S/P PERCUTANEOUS CORONARY ANGIOPLASTY: ICD-10-CM

## 2023-08-15 DIAGNOSIS — E11.42 TYPE 2 DIABETES MELLITUS WITH DIABETIC POLYNEUROPATHY, WITHOUT LONG-TERM CURRENT USE OF INSULIN (HCC): ICD-10-CM

## 2023-08-15 DIAGNOSIS — I25.10 CAD S/P PERCUTANEOUS CORONARY ANGIOPLASTY: ICD-10-CM

## 2023-08-15 DIAGNOSIS — H25.13 AGE-RELATED NUCLEAR CATARACT OF BOTH EYES: ICD-10-CM

## 2023-08-15 RX ORDER — LOSARTAN POTASSIUM 25 MG/1
25 TABLET ORAL DAILY
Qty: 90 TABLET | Refills: 1 | Status: SHIPPED | OUTPATIENT
Start: 2023-08-15 | End: 2024-02-11

## 2023-08-15 RX ORDER — GABAPENTIN 300 MG/1
CAPSULE ORAL
Qty: 270 CAPSULE | Refills: 1 | Status: SHIPPED | OUTPATIENT
Start: 2023-08-15 | End: 2024-02-27

## 2023-08-15 NOTE — PROGRESS NOTES
8/15/2023    [place] Magda (:  1950) is a 67 y.o. male, : 1950   Patient presents for follow up of their chronic medical problems and for a preoperative medical evaluation regarding the stability of his medical problems. He has CKD (chronic kidney disease); Essential hypertension; Type 2 diabetes mellitus with diabetic polyneuropathy, without long-term current use of insulin ([address]); and CAD S/P percutaneous coronary angioplasty on their pertinent problem list.   He is scheduled for cataract surgery Dr. Ashwini Allen on 2023 and 2023. Patient had recent lab work done on 2023 his last hemoglobin A1c was 7.0% at the end of April. He is due to see his cardiologist for his annual visit. He denies any recent issues with his heart    Patient Active Problem List   Diagnosis    GERD (gastroesophageal reflux disease)    Chronic eczema    Recurrent sinusitis    Tear of medial meniscus of knee    Gout    Benign non-nodular prostatic hyperplasia without lower urinary tract symptoms    CKD (chronic kidney disease)    Essential hypertension    Mixed hyperlipidemia    Idiopathic peripheral neuropathy    Chronic midline low back pain with bilateral sciatica    Type 2 diabetes mellitus with diabetic polyneuropathy, without long-term current use of insulin (HCC)    CAD S/P percutaneous coronary angioplasty    Microalbuminuria    Age-related nuclear cataract of both eyes    Ptosis of both eyelids    Localized osteoarthritis of right knee    Stage 3a chronic kidney disease (HCC)    Olecranon bursitis of right elbow    Ulnar neuropathy of left upper extremity       Review of Systems  Negative except for vision impairment related to cataracts    Prior to Visit Medications    Medication Sig Taking?  Authorizing Provider   gabapentin (NEURONTIN) 300 MG capsule take 1 capsule by mouth three times a day Yes Linda Zaldivar MD   losartan (COZAAR) 25 MG tablet Take 1 tablet by mouth daily (Dose

## 2023-08-15 NOTE — PATIENT INSTRUCTIONS
Please read the healthy family handout that you were given and share it with your family. Please compare this printed medication list with your medications at home to be sure they are the same. If you have any medications that are different please contact us immediately at [phone]. Also review your allergies that we have listed, these may also include medications that you have not been able to tolerate, make sure everything listed is correct. If you have any allergies that are different please contact us immediately at [phone]. You may receive a survey in the mail or by email asking about your experience during your visit today. Please complete and return to us so we know how we are serving you.

## 2023-09-14 ENCOUNTER — TELEPHONE (OUTPATIENT)
Dept: FAMILY MEDICINE CLINIC | Age: 73
End: 2023-09-14

## 2023-09-14 RX ORDER — AZITHROMYCIN 250 MG/1
TABLET, FILM COATED ORAL
Qty: 6 TABLET | Refills: 0 | Status: SHIPPED | OUTPATIENT
Start: 2023-09-14

## 2023-09-14 NOTE — TELEPHONE ENCOUNTER
Patient is c/o sinus infection symptoms.   He said you will usually prescribe zpak for him   Wanted to see if you would send to [address]

## 2023-09-15 DIAGNOSIS — G89.29 CHRONIC MIDLINE LOW BACK PAIN WITH BILATERAL SCIATICA: ICD-10-CM

## 2023-09-15 DIAGNOSIS — M54.42 CHRONIC MIDLINE LOW BACK PAIN WITH BILATERAL SCIATICA: ICD-10-CM

## 2023-09-15 DIAGNOSIS — M54.41 CHRONIC MIDLINE LOW BACK PAIN WITH BILATERAL SCIATICA: ICD-10-CM

## 2023-09-15 RX ORDER — TRAMADOL HYDROCHLORIDE 50 MG/1
50 TABLET ORAL EVERY 6 HOURS PRN
Qty: 120 TABLET | Refills: 0 | Status: SHIPPED | OUTPATIENT
Start: 2023-09-15 | End: 2023-10-15

## 2023-09-15 NOTE — TELEPHONE ENCOUNTER
Date of last refill of this med was 8/14, # of pills given 120 and # of refills given 0. Their next appointment is 10/10, the last date patient was seen was 8/15. Does patient have medication agreement on file? No  Has drug screen been done in last 12 months if needed?  no

## 2023-10-10 ENCOUNTER — OFFICE VISIT (OUTPATIENT)
Dept: FAMILY MEDICINE CLINIC | Age: 73
End: 2023-10-10

## 2023-10-10 VITALS
TEMPERATURE: 97.7 F | OXYGEN SATURATION: 93 % | HEART RATE: 58 BPM | BODY MASS INDEX: 29.14 KG/M2 | SYSTOLIC BLOOD PRESSURE: 104 MMHG | DIASTOLIC BLOOD PRESSURE: 63 MMHG | WEIGHT: 230 LBS

## 2023-10-10 DIAGNOSIS — Z98.61 CAD S/P PERCUTANEOUS CORONARY ANGIOPLASTY: ICD-10-CM

## 2023-10-10 DIAGNOSIS — I25.10 CAD S/P PERCUTANEOUS CORONARY ANGIOPLASTY: ICD-10-CM

## 2023-10-10 DIAGNOSIS — I10 ESSENTIAL HYPERTENSION: ICD-10-CM

## 2023-10-10 DIAGNOSIS — E78.2 MIXED HYPERLIPIDEMIA: ICD-10-CM

## 2023-10-10 DIAGNOSIS — Z12.5 SCREENING PSA (PROSTATE SPECIFIC ANTIGEN): ICD-10-CM

## 2023-10-10 DIAGNOSIS — E11.42 TYPE 2 DIABETES MELLITUS WITH DIABETIC POLYNEUROPATHY, WITHOUT LONG-TERM CURRENT USE OF INSULIN (HCC): Primary | ICD-10-CM

## 2023-10-10 NOTE — PATIENT INSTRUCTIONS
Please read the healthy family handout that you were given and share it with your family. Please compare this printed medication list with your medications at home to be sure they are the same. If you have any medications that are different please contact us immediately at [phone]. Also review your allergies that we have listed, these may also include medications that you have not been able to tolerate, make sure everything listed is correct. If you have any allergies that are different please contact us immediately at [phone]. You may receive a survey in the mail or by email asking about your experience during your visit today. Please complete and return to us so we know how we are serving you.

## 2023-10-10 NOTE — PROGRESS NOTES
percutaneous coronary angioplasty  TSH with Reflex      5. Screening PSA (prostate specific antigen)  PSA Screening      Heart disease stable. Check status of diabetes and cholesterol with blood work today    Blood pressure well controlled continue current treatment  The problems listed in the assessment are stable unless otherwise indicated. Prescription drug management completed today. He  was instructed to continue their current medications and treatment fo6 mor the above problems unless otherwise indicated above. Age-specific preventative medicine recommendations were reviewed with patient today. Follow up in 6mo. Call or return to office for any problems that develop before the next scheduled follow-up appointment. Evette Michel M.D. Parts of this note were completed using voice recognition transcription. Every effort was made to ensure accuracy; however, inadvertent transcription errors may be present.

## 2023-10-11 LAB
ALBUMIN SERPL-MCNC: 4.7 G/DL (ref 3.4–5)
ALBUMIN/GLOB SERPL: 2.1 {RATIO} (ref 1.1–2.2)
ALP SERPL-CCNC: 68 U/L (ref 40–129)
ALT SERPL-CCNC: 35 U/L (ref 10–40)
ANION GAP SERPL CALCULATED.3IONS-SCNC: 13 MMOL/L (ref 3–16)
AST SERPL-CCNC: 25 U/L (ref 15–37)
BILIRUB SERPL-MCNC: 0.7 MG/DL (ref 0–1)
BUN SERPL-MCNC: 21 MG/DL (ref 7–20)
CALCIUM SERPL-MCNC: 9.3 MG/DL (ref 8.3–10.6)
CHLORIDE SERPL-SCNC: 99 MMOL/L (ref 99–110)
CHOLEST SERPL-MCNC: 137 MG/DL (ref 0–199)
CO2 SERPL-SCNC: 25 MMOL/L (ref 21–32)
CREAT SERPL-MCNC: 1.1 MG/DL (ref 0.8–1.3)
EST. AVERAGE GLUCOSE BLD GHB EST-MCNC: 137 MG/DL
GFR SERPLBLD CREATININE-BSD FMLA CKD-EPI: >60 ML/MIN/{1.73_M2}
GLUCOSE SERPL-MCNC: 110 MG/DL (ref 70–99)
HBA1C MFR BLD: 6.4 %
HDLC SERPL-MCNC: 42 MG/DL (ref 40–60)
LDLC SERPL CALC-MCNC: 48 MG/DL
POTASSIUM SERPL-SCNC: 4.8 MMOL/L (ref 3.5–5.1)
PROT SERPL-MCNC: 6.9 G/DL (ref 6.4–8.2)
PSA SERPL DL<=0.01 NG/ML-MCNC: 1.07 NG/ML (ref 0–4)
SODIUM SERPL-SCNC: 137 MMOL/L (ref 136–145)
TRIGL SERPL-MCNC: 236 MG/DL (ref 0–150)
TSH SERPL DL<=0.005 MIU/L-ACNC: 1.49 UIU/ML (ref 0.27–4.2)
VLDLC SERPL CALC-MCNC: 47 MG/DL

## 2023-10-25 DIAGNOSIS — M54.42 CHRONIC MIDLINE LOW BACK PAIN WITH BILATERAL SCIATICA: ICD-10-CM

## 2023-10-25 DIAGNOSIS — M54.41 CHRONIC MIDLINE LOW BACK PAIN WITH BILATERAL SCIATICA: ICD-10-CM

## 2023-10-25 DIAGNOSIS — G89.29 CHRONIC MIDLINE LOW BACK PAIN WITH BILATERAL SCIATICA: ICD-10-CM

## 2023-10-25 RX ORDER — TRAMADOL HYDROCHLORIDE 50 MG/1
TABLET ORAL
Qty: 120 TABLET | Refills: 2 | Status: SHIPPED | OUTPATIENT
Start: 2023-10-25 | End: 2023-11-24

## 2023-10-25 NOTE — TELEPHONE ENCOUNTER
Date of last refill of this med was 9/15, # of pills given 120 and # of refills given 0. Their next appointment is 5/7, the last date patient was seen was 10/10. Does patient have medication agreement on file? Yes  Has drug screen been done in last 12 months if needed?  no

## 2023-11-19 DIAGNOSIS — I10 ESSENTIAL HYPERTENSION: ICD-10-CM

## 2023-11-20 RX ORDER — METOPROLOL SUCCINATE 50 MG/1
50 TABLET, EXTENDED RELEASE ORAL DAILY
Qty: 90 TABLET | Refills: 1 | Status: SHIPPED | OUTPATIENT
Start: 2023-11-20

## 2024-01-05 DIAGNOSIS — G89.29 CHRONIC MIDLINE LOW BACK PAIN WITH BILATERAL SCIATICA: ICD-10-CM

## 2024-01-05 DIAGNOSIS — M54.41 CHRONIC MIDLINE LOW BACK PAIN WITH BILATERAL SCIATICA: ICD-10-CM

## 2024-01-05 DIAGNOSIS — M54.42 CHRONIC MIDLINE LOW BACK PAIN WITH BILATERAL SCIATICA: ICD-10-CM

## 2024-01-05 RX ORDER — TRAMADOL HYDROCHLORIDE 50 MG/1
50 TABLET ORAL EVERY 6 HOURS PRN
Qty: 120 TABLET | Refills: 0 | Status: SHIPPED | OUTPATIENT
Start: 2024-01-05 | End: 2024-02-04

## 2024-01-05 NOTE — TELEPHONE ENCOUNTER
Patient changing pharmacies they did not transfer his tramadol script over, new script needs sent to [facility]     Date of last refill of this med was 10/25, # of pills given 120 and # of refills given 2.  Their next appointment is 5/7, the last date patient was seen was 10/10.  Does patient have medication agreement on file? No  Has drug screen been done in last 12 months if needed? no

## 2024-02-07 DIAGNOSIS — M54.42 CHRONIC MIDLINE LOW BACK PAIN WITH BILATERAL SCIATICA: ICD-10-CM

## 2024-02-07 DIAGNOSIS — M54.41 CHRONIC MIDLINE LOW BACK PAIN WITH BILATERAL SCIATICA: ICD-10-CM

## 2024-02-07 DIAGNOSIS — G89.29 CHRONIC MIDLINE LOW BACK PAIN WITH BILATERAL SCIATICA: ICD-10-CM

## 2024-02-07 RX ORDER — TRAMADOL HYDROCHLORIDE 50 MG/1
50 TABLET ORAL EVERY 6 HOURS PRN
Qty: 120 TABLET | Refills: 0 | Status: SHIPPED | OUTPATIENT
Start: 2024-02-07 | End: 2024-03-08

## 2024-02-07 NOTE — TELEPHONE ENCOUNTER
Date of last refill of this med was 1/5/24, # of pills given 120 and # of refills given 0.  Their next appointment is 5/7/24, the last date patient was seen was 10/10/23.  Does patient have medication agreement on file? No  Has drug screen been done in last 12 months if needed? N/A

## 2024-03-08 ENCOUNTER — TELEPHONE (OUTPATIENT)
Dept: FAMILY MEDICINE CLINIC | Age: 74
End: 2024-03-08

## 2024-03-08 RX ORDER — AZITHROMYCIN 250 MG/1
TABLET, FILM COATED ORAL
Qty: 6 TABLET | Refills: 0 | Status: SHIPPED | OUTPATIENT
Start: 2024-03-08 | End: 2024-03-18

## 2024-03-08 NOTE — TELEPHONE ENCOUNTER
Patient c/o nasal congestion, he has history of recurrent sinusitis which is listed on his problem list.  He said Dr Crawford will prescribe him zpak when symptoms start, patient wanted to see if this could be prescribed for him  [facility]

## 2024-03-13 DIAGNOSIS — M54.41 CHRONIC MIDLINE LOW BACK PAIN WITH BILATERAL SCIATICA: ICD-10-CM

## 2024-03-13 DIAGNOSIS — M54.42 CHRONIC MIDLINE LOW BACK PAIN WITH BILATERAL SCIATICA: ICD-10-CM

## 2024-03-13 DIAGNOSIS — G89.29 CHRONIC MIDLINE LOW BACK PAIN WITH BILATERAL SCIATICA: ICD-10-CM

## 2024-03-13 RX ORDER — TRAMADOL HYDROCHLORIDE 50 MG/1
50 TABLET ORAL EVERY 6 HOURS PRN
Qty: 120 TABLET | Refills: 0 | Status: SHIPPED | OUTPATIENT
Start: 2024-03-13 | End: 2024-04-12

## 2024-03-13 NOTE — TELEPHONE ENCOUNTER
Refill Request     CONFIRM preferrred pharmacy with the patient.    If Mail Order Rx - Pend for 90 day refill.      Last Seen: Last Seen Department: Visit date not found  Last Seen by PCP: Visit date not found    Last Written: 2/7/24    If no future appointment scheduled, route STAFF MESSAGE with patient name to the  Pool for scheduling.      Next Appointment:   Future Appointments   Date Time Provider Department Center   5/7/2024  8:00 AM Avni Crawford MD [facility]       Message sent to  to schedule appt with patient?  NO      Requested Prescriptions     Pending Prescriptions Disp Refills    traMADol (ULTRAM) 50 MG tablet [Pharmacy Med Name: TRAMADOL HYDROCHLORIDE 50MG TABLET] 120 tablet 0     Sig: TAKE ONE (1) TABLET BY MOUTH EVERY SIX (6) HOURS AS NEEDED FOR PAIN FOR UP TO 30 DAYS. MAX DAILY AMOUNT: 200 MG

## 2024-03-22 ENCOUNTER — TELEPHONE (OUTPATIENT)
Dept: FAMILY MEDICINE CLINIC | Age: 74
End: 2024-03-22

## 2024-03-22 RX ORDER — GABAPENTIN 300 MG/1
300 CAPSULE ORAL 4 TIMES DAILY
Qty: 120 CAPSULE | Refills: 2 | Status: SHIPPED | OUTPATIENT
Start: 2024-03-22 | End: 2024-06-20

## 2024-03-22 NOTE — TELEPHONE ENCOUNTER
Requesting a refill of Gabapentin 300 mg. Send to [place] [facility] pharmacy. Patient asking if he can increase from 3 to 4 pills a day. Neuropathy is flaring up

## 2024-03-22 NOTE — TELEPHONE ENCOUNTER
Requesting a refill of Gabapentin 300 mg. Send to [place] pharmacy. Patient asking if he can increase from 3 to 4 pills a day. Neuropathy is flaring up. You will need to put the rx in and sign it if you are going to increase this. Patient is out of medication.

## 2024-04-17 DIAGNOSIS — M54.42 CHRONIC MIDLINE LOW BACK PAIN WITH BILATERAL SCIATICA: ICD-10-CM

## 2024-04-17 DIAGNOSIS — G89.29 CHRONIC MIDLINE LOW BACK PAIN WITH BILATERAL SCIATICA: ICD-10-CM

## 2024-04-17 DIAGNOSIS — M54.41 CHRONIC MIDLINE LOW BACK PAIN WITH BILATERAL SCIATICA: ICD-10-CM

## 2024-04-17 RX ORDER — TRAMADOL HYDROCHLORIDE 50 MG/1
50 TABLET ORAL EVERY 6 HOURS PRN
Qty: 120 TABLET | Refills: 0 | Status: SHIPPED | OUTPATIENT
Start: 2024-04-17 | End: 2024-05-17

## 2024-04-17 NOTE — TELEPHONE ENCOUNTER
Date of last refill of this med was 3/13, # of pills given 120 and # of refills given 0.  Their next appointment is 5/7, the last date patient was seen was 10/10.  Does patient have medication agreement on file? No  Has drug screen been done in last 12 months if needed? no

## 2024-04-18 DIAGNOSIS — I10 ESSENTIAL HYPERTENSION: ICD-10-CM

## 2024-04-18 RX ORDER — LOSARTAN POTASSIUM 25 MG/1
25 TABLET ORAL DAILY
Qty: 90 TABLET | Refills: 1 | Status: SHIPPED | OUTPATIENT
Start: 2024-04-18

## 2024-05-07 ENCOUNTER — OFFICE VISIT (OUTPATIENT)
Dept: FAMILY MEDICINE CLINIC | Age: 74
End: 2024-05-07
Payer: MEDICARE

## 2024-05-07 VITALS
SYSTOLIC BLOOD PRESSURE: 115 MMHG | DIASTOLIC BLOOD PRESSURE: 66 MMHG | BODY MASS INDEX: 30.02 KG/M2 | HEART RATE: 57 BPM | OXYGEN SATURATION: 94 % | WEIGHT: 237 LBS | TEMPERATURE: 97.7 F

## 2024-05-07 DIAGNOSIS — N18.31 STAGE 3A CHRONIC KIDNEY DISEASE (HCC): ICD-10-CM

## 2024-05-07 DIAGNOSIS — M54.41 CHRONIC MIDLINE LOW BACK PAIN WITH BILATERAL SCIATICA: ICD-10-CM

## 2024-05-07 DIAGNOSIS — E78.2 MIXED HYPERLIPIDEMIA: ICD-10-CM

## 2024-05-07 DIAGNOSIS — E11.42 TYPE 2 DIABETES MELLITUS WITH DIABETIC POLYNEUROPATHY, WITHOUT LONG-TERM CURRENT USE OF INSULIN (HCC): ICD-10-CM

## 2024-05-07 DIAGNOSIS — M54.42 CHRONIC MIDLINE LOW BACK PAIN WITH BILATERAL SCIATICA: ICD-10-CM

## 2024-05-07 DIAGNOSIS — I10 ESSENTIAL HYPERTENSION: Primary | ICD-10-CM

## 2024-05-07 DIAGNOSIS — G89.29 CHRONIC MIDLINE LOW BACK PAIN WITH BILATERAL SCIATICA: ICD-10-CM

## 2024-05-07 LAB
ALT SERPL-CCNC: 46 U/L (ref 10–40)
ANION GAP SERPL CALCULATED.3IONS-SCNC: 14 MMOL/L (ref 3–16)
BUN SERPL-MCNC: 17 MG/DL (ref 7–20)
CALCIUM SERPL-MCNC: 9.9 MG/DL (ref 8.3–10.6)
CHLORIDE SERPL-SCNC: 101 MMOL/L (ref 99–110)
CHOLEST SERPL-MCNC: 152 MG/DL (ref 0–199)
CO2 SERPL-SCNC: 24 MMOL/L (ref 21–32)
CREAT SERPL-MCNC: 1.1 MG/DL (ref 0.8–1.3)
FERRITIN SERPL IA-MCNC: 318.7 NG/ML (ref 30–400)
GFR SERPLBLD CREATININE-BSD FMLA CKD-EPI: 70 ML/MIN/{1.73_M2}
GLUCOSE SERPL-MCNC: 158 MG/DL (ref 70–99)
HDLC SERPL-MCNC: 36 MG/DL (ref 40–60)
LDLC SERPL CALC-MCNC: 57 MG/DL
MAGNESIUM SERPL-MCNC: 1.9 MG/DL (ref 1.8–2.4)
POTASSIUM SERPL-SCNC: 4.7 MMOL/L (ref 3.5–5.1)
SODIUM SERPL-SCNC: 139 MMOL/L (ref 136–145)
TRIGL SERPL-MCNC: 296 MG/DL (ref 0–150)
VLDLC SERPL CALC-MCNC: 59 MG/DL

## 2024-05-07 PROCEDURE — 2022F DILAT RTA XM EVC RTNOPTHY: CPT | Performed by: FAMILY MEDICINE

## 2024-05-07 PROCEDURE — 3046F HEMOGLOBIN A1C LEVEL >9.0%: CPT | Performed by: FAMILY MEDICINE

## 2024-05-07 PROCEDURE — 3017F COLORECTAL CA SCREEN DOC REV: CPT | Performed by: FAMILY MEDICINE

## 2024-05-07 PROCEDURE — 99214 OFFICE O/P EST MOD 30 MIN: CPT | Performed by: FAMILY MEDICINE

## 2024-05-07 PROCEDURE — G8417 CALC BMI ABV UP PARAM F/U: HCPCS | Performed by: FAMILY MEDICINE

## 2024-05-07 PROCEDURE — 1036F TOBACCO NON-USER: CPT | Performed by: FAMILY MEDICINE

## 2024-05-07 PROCEDURE — 3078F DIAST BP <80 MM HG: CPT | Performed by: FAMILY MEDICINE

## 2024-05-07 PROCEDURE — 3074F SYST BP LT 130 MM HG: CPT | Performed by: FAMILY MEDICINE

## 2024-05-07 PROCEDURE — 1123F ACP DISCUSS/DSCN MKR DOCD: CPT | Performed by: FAMILY MEDICINE

## 2024-05-07 PROCEDURE — 36415 COLL VENOUS BLD VENIPUNCTURE: CPT | Performed by: FAMILY MEDICINE

## 2024-05-07 PROCEDURE — G8427 DOCREV CUR MEDS BY ELIG CLIN: HCPCS | Performed by: FAMILY MEDICINE

## 2024-05-07 RX ORDER — METOPROLOL SUCCINATE 50 MG/1
50 TABLET, EXTENDED RELEASE ORAL DAILY
Qty: 90 TABLET | Refills: 1 | Status: SHIPPED | OUTPATIENT
Start: 2024-05-07

## 2024-05-07 RX ORDER — TRAMADOL HYDROCHLORIDE 50 MG/1
50 TABLET ORAL EVERY 6 HOURS PRN
Qty: 120 TABLET | Refills: 0 | Status: SHIPPED | OUTPATIENT
Start: 2024-05-07 | End: 2024-06-06

## 2024-05-07 ASSESSMENT — PATIENT HEALTH QUESTIONNAIRE - PHQ9
SUM OF ALL RESPONSES TO PHQ QUESTIONS 1-9: 0
2. FEELING DOWN, DEPRESSED OR HOPELESS: NOT AT ALL
SUM OF ALL RESPONSES TO PHQ QUESTIONS 1-9: 0
1. LITTLE INTEREST OR PLEASURE IN DOING THINGS: NOT AT ALL
SUM OF ALL RESPONSES TO PHQ9 QUESTIONS 1 & 2: 0

## 2024-05-07 NOTE — PATIENT INSTRUCTIONS
Please read the healthy family handout that you were given and share it with your family.       Please compare this printed medication list with your medications at home to be sure they are the same.  If you have any medications that are different please contact us immediately at [phone].     Also review your allergies that we have listed, these may also include medications that you have not been able to tolerate, make sure everything listed is correct. If you have any allergies that are different please contact us immediately at [phone].     You may receive a survey in the mail or by email asking about your experience during your visit today. Please complete and return to us so we know how we are serving you.

## 2024-05-07 NOTE — PROGRESS NOTES
He presents today for routine follow-up of his chronic health problems including diabetes high cholesterol and blood pressure issues.  He has been having heart palpitations again and last time he had those the cardiologist told him to take magnesium and it helped see what he was thinking about taking extra magnesium if that was okay.  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  BP Readings from Last 3 Encounters:   10/10/23 104/63   08/15/23 111/67   04/10/23 121/71     Physical Exam  Vitals reviewed.   Constitutional:       Appearance: He is well-developed. He is not toxic-appearing.   HENT:      Head: Normocephalic.   Neck:      Thyroid: No thyroid mass or thyromegaly.   Cardiovascular:      Rate and Rhythm: Normal rate and regular rhythm.      Heart sounds: Normal heart sounds. No murmur heard.  Pulmonary:      Effort: No respiratory distress.      Breath sounds: Normal breath sounds. No wheezing or rales.   Abdominal:      General: There is no distension.      Palpations: Abdomen is soft. There is no mass.      Tenderness: There is no abdominal tenderness. There is no guarding or rebound.   Musculoskeletal:      Cervical back: Neck supple.   Lymphadenopathy:      Cervical: No cervical adenopathy.      Upper Body:      Right upper body: No supraclavicular adenopathy.   Skin:     General: Skin is warm.   Neurological:      Mental Status: He is alert and oriented to person, place, and time.   Psychiatric:         Behavior: Behavior normal.         Thought Content: Thought content normal.         Judgment: Judgment normal.       Assessment and Plan:   Diagnosis Orders   1. Essential hypertension        2. Type 2 diabetes mellitus with diabetic

## 2024-05-08 LAB
EST. AVERAGE GLUCOSE BLD GHB EST-MCNC: 151.3 MG/DL
HBA1C MFR BLD: 6.9 %

## 2024-05-09 DIAGNOSIS — R74.8 ELEVATED LIVER ENZYMES: Primary | ICD-10-CM

## 2024-05-21 RX ORDER — GABAPENTIN 300 MG/1
CAPSULE ORAL
Qty: 120 CAPSULE | Refills: 2 | Status: SHIPPED | OUTPATIENT
Start: 2024-05-21 | End: 2024-08-19

## 2024-06-29 DIAGNOSIS — G89.29 CHRONIC MIDLINE LOW BACK PAIN WITH BILATERAL SCIATICA: ICD-10-CM

## 2024-06-29 DIAGNOSIS — M54.41 CHRONIC MIDLINE LOW BACK PAIN WITH BILATERAL SCIATICA: ICD-10-CM

## 2024-06-29 DIAGNOSIS — M54.42 CHRONIC MIDLINE LOW BACK PAIN WITH BILATERAL SCIATICA: ICD-10-CM

## 2024-07-01 RX ORDER — TRAMADOL HYDROCHLORIDE 50 MG/1
50 TABLET ORAL EVERY 6 HOURS PRN
Qty: 120 TABLET | Refills: 0 | Status: SHIPPED | OUTPATIENT
Start: 2024-07-01 | End: 2024-07-31

## 2024-07-23 ENCOUNTER — TELEPHONE (OUTPATIENT)
Dept: FAMILY MEDICINE CLINIC | Age: 74
End: 2024-07-23

## 2024-07-31 DIAGNOSIS — M54.42 CHRONIC MIDLINE LOW BACK PAIN WITH BILATERAL SCIATICA: ICD-10-CM

## 2024-07-31 DIAGNOSIS — M54.41 CHRONIC MIDLINE LOW BACK PAIN WITH BILATERAL SCIATICA: ICD-10-CM

## 2024-07-31 DIAGNOSIS — G89.29 CHRONIC MIDLINE LOW BACK PAIN WITH BILATERAL SCIATICA: ICD-10-CM

## 2024-07-31 RX ORDER — TRAMADOL HYDROCHLORIDE 50 MG/1
50 TABLET ORAL EVERY 6 HOURS PRN
Qty: 120 TABLET | Refills: 1 | Status: SHIPPED | OUTPATIENT
Start: 2024-07-31 | End: 2024-09-29

## 2024-07-31 NOTE — TELEPHONE ENCOUNTER
Date of last refill of this med was 7/1, # of pills given 120 and # of refills given 0.  Their next appointment is 11/19, the last date patient was seen was 5/7.  Does patient have medication agreement on file? No  Has drug screen been done in last 12 months if needed? no

## 2024-08-20 ENCOUNTER — TELEPHONE (OUTPATIENT)
Dept: FAMILY MEDICINE CLINIC | Age: 74
End: 2024-08-20

## 2024-08-29 DIAGNOSIS — I10 ESSENTIAL HYPERTENSION: ICD-10-CM

## 2024-08-29 RX ORDER — GABAPENTIN 300 MG/1
CAPSULE ORAL
Qty: 120 CAPSULE | Refills: 2 | Status: SHIPPED | OUTPATIENT
Start: 2024-08-29 | End: 2024-11-27

## 2024-08-29 RX ORDER — METOPROLOL SUCCINATE 50 MG/1
50 TABLET, EXTENDED RELEASE ORAL DAILY
Qty: 90 TABLET | Refills: 1 | Status: SHIPPED | OUTPATIENT
Start: 2024-08-29

## 2024-08-29 NOTE — TELEPHONE ENCOUNTER
Gabapentin:  Date of last refill of this med was 5/21/24, # of pills given 120 and # of refills given 2.  Their next appointment is 11/19/24, the last date patient was seen was 5/7/24.  Does patient have medication agreement on file? No  Has drug screen been done in last 12 months if needed? no

## 2024-09-26 RX ORDER — AZITHROMYCIN 250 MG/1
TABLET, FILM COATED ORAL
Qty: 6 TABLET | Refills: 0 | Status: SHIPPED | OUTPATIENT
Start: 2024-09-26

## 2024-10-08 ENCOUNTER — OFFICE VISIT (OUTPATIENT)
Dept: FAMILY MEDICINE CLINIC | Age: 74
End: 2024-10-08
Payer: MEDICARE

## 2024-10-08 VITALS
SYSTOLIC BLOOD PRESSURE: 107 MMHG | HEART RATE: 74 BPM | OXYGEN SATURATION: 95 % | WEIGHT: 230 LBS | DIASTOLIC BLOOD PRESSURE: 65 MMHG | TEMPERATURE: 97.6 F | BODY MASS INDEX: 29.14 KG/M2

## 2024-10-08 DIAGNOSIS — E11.42 TYPE 2 DIABETES MELLITUS WITH DIABETIC POLYNEUROPATHY, WITHOUT LONG-TERM CURRENT USE OF INSULIN (HCC): Primary | ICD-10-CM

## 2024-10-08 DIAGNOSIS — G89.29 CHRONIC MIDLINE LOW BACK PAIN WITH BILATERAL SCIATICA: ICD-10-CM

## 2024-10-08 DIAGNOSIS — M54.42 CHRONIC MIDLINE LOW BACK PAIN WITH BILATERAL SCIATICA: ICD-10-CM

## 2024-10-08 DIAGNOSIS — M54.41 CHRONIC MIDLINE LOW BACK PAIN WITH BILATERAL SCIATICA: ICD-10-CM

## 2024-10-08 PROCEDURE — G8484 FLU IMMUNIZE NO ADMIN: HCPCS | Performed by: FAMILY MEDICINE

## 2024-10-08 PROCEDURE — G8417 CALC BMI ABV UP PARAM F/U: HCPCS | Performed by: FAMILY MEDICINE

## 2024-10-08 PROCEDURE — 1036F TOBACCO NON-USER: CPT | Performed by: FAMILY MEDICINE

## 2024-10-08 PROCEDURE — 99213 OFFICE O/P EST LOW 20 MIN: CPT | Performed by: FAMILY MEDICINE

## 2024-10-08 PROCEDURE — 3078F DIAST BP <80 MM HG: CPT | Performed by: FAMILY MEDICINE

## 2024-10-08 PROCEDURE — G8427 DOCREV CUR MEDS BY ELIG CLIN: HCPCS | Performed by: FAMILY MEDICINE

## 2024-10-08 PROCEDURE — 1123F ACP DISCUSS/DSCN MKR DOCD: CPT | Performed by: FAMILY MEDICINE

## 2024-10-08 PROCEDURE — 3017F COLORECTAL CA SCREEN DOC REV: CPT | Performed by: FAMILY MEDICINE

## 2024-10-08 PROCEDURE — 3044F HG A1C LEVEL LT 7.0%: CPT | Performed by: FAMILY MEDICINE

## 2024-10-08 PROCEDURE — 3074F SYST BP LT 130 MM HG: CPT | Performed by: FAMILY MEDICINE

## 2024-10-08 PROCEDURE — 2022F DILAT RTA XM EVC RTNOPTHY: CPT | Performed by: FAMILY MEDICINE

## 2024-10-08 RX ORDER — AZITHROMYCIN 250 MG/1
TABLET, FILM COATED ORAL
Qty: 6 TABLET | Refills: 0 | Status: SHIPPED | OUTPATIENT
Start: 2024-10-08

## 2024-10-08 RX ORDER — TRAMADOL HYDROCHLORIDE 50 MG/1
50 TABLET ORAL EVERY 6 HOURS PRN
Qty: 120 TABLET | Refills: 1 | Status: SHIPPED | OUTPATIENT
Start: 2024-10-08 | End: 2024-12-07

## 2024-10-08 SDOH — ECONOMIC STABILITY: INCOME INSECURITY: HOW HARD IS IT FOR YOU TO PAY FOR THE VERY BASICS LIKE FOOD, HOUSING, MEDICAL CARE, AND HEATING?: NOT HARD AT ALL

## 2024-10-08 SDOH — ECONOMIC STABILITY: FOOD INSECURITY: WITHIN THE PAST 12 MONTHS, THE FOOD YOU BOUGHT JUST DIDN'T LAST AND YOU DIDN'T HAVE MONEY TO GET MORE.: NEVER TRUE

## 2024-10-08 SDOH — ECONOMIC STABILITY: FOOD INSECURITY: WITHIN THE PAST 12 MONTHS, YOU WORRIED THAT YOUR FOOD WOULD RUN OUT BEFORE YOU GOT MONEY TO BUY MORE.: NEVER TRUE

## 2024-10-08 NOTE — PATIENT INSTRUCTIONS
Please read the healthy family handout that you were given and share it with your family.       Please compare this printed medication list with your medications at home to be sure they are the same.  If you have any medications that are different please contact us immediately at [phone].     Also review your allergies that we have listed, these may also include medications that you have not been able to tolerate, make sure everything listed is correct. If you have any allergies that are different please contact us immediately at [phone].     You may receive a survey in the mail or by email asking about your experience during your visit today. Please complete and return to us so we know how we are serving you.

## 2024-10-08 NOTE — PROGRESS NOTES
drug management completed today. He  was instructed to continue their current medications and treatment for the above problems unless otherwise indicated above.   Follow up in 4 to 6 months. Call or return to office for any problems that develop before the next scheduled follow-up appointment.     Avni Crawford M.D.    Parts of this note were completed using voice recognition transcription.  Every effort was made to ensure accuracy; however, inadvertent transcription errors may be present.

## 2024-10-14 DIAGNOSIS — E11.42 TYPE 2 DIABETES MELLITUS WITH DIABETIC POLYNEUROPATHY, WITHOUT LONG-TERM CURRENT USE OF INSULIN (HCC): ICD-10-CM

## 2024-10-14 RX ORDER — GABAPENTIN 300 MG/1
CAPSULE ORAL
Qty: 120 CAPSULE | Refills: 2 | OUTPATIENT
Start: 2024-10-14 | End: 2025-01-14

## 2024-10-14 NOTE — TELEPHONE ENCOUNTER
Denied.  Too Early    Date of last refill of this med was 8/15/24, # of pills given 270 and # of refills given 1.

## 2024-10-28 DIAGNOSIS — E11.42 TYPE 2 DIABETES MELLITUS WITH DIABETIC POLYNEUROPATHY, WITHOUT LONG-TERM CURRENT USE OF INSULIN (HCC): ICD-10-CM

## 2024-10-28 RX ORDER — GABAPENTIN 300 MG/1
CAPSULE ORAL
Qty: 120 CAPSULE | Refills: 2 | Status: SHIPPED | OUTPATIENT
Start: 2024-10-28 | End: 2024-12-28

## 2024-11-19 ENCOUNTER — OFFICE VISIT (OUTPATIENT)
Dept: FAMILY MEDICINE CLINIC | Age: 74
End: 2024-11-19
Payer: MEDICARE

## 2024-11-19 VITALS
HEART RATE: 57 BPM | OXYGEN SATURATION: 94 % | WEIGHT: 229 LBS | TEMPERATURE: 97.5 F | BODY MASS INDEX: 29.01 KG/M2 | SYSTOLIC BLOOD PRESSURE: 106 MMHG | DIASTOLIC BLOOD PRESSURE: 62 MMHG

## 2024-11-19 DIAGNOSIS — Z12.5 SCREENING PSA (PROSTATE SPECIFIC ANTIGEN): ICD-10-CM

## 2024-11-19 DIAGNOSIS — N18.31 STAGE 3A CHRONIC KIDNEY DISEASE (HCC): ICD-10-CM

## 2024-11-19 DIAGNOSIS — E11.42 TYPE 2 DIABETES MELLITUS WITH DIABETIC POLYNEUROPATHY, WITHOUT LONG-TERM CURRENT USE OF INSULIN (HCC): Primary | ICD-10-CM

## 2024-11-19 DIAGNOSIS — I25.10 CAD S/P PERCUTANEOUS CORONARY ANGIOPLASTY: ICD-10-CM

## 2024-11-19 DIAGNOSIS — I10 ESSENTIAL HYPERTENSION: ICD-10-CM

## 2024-11-19 DIAGNOSIS — E78.2 MIXED HYPERLIPIDEMIA: ICD-10-CM

## 2024-11-19 DIAGNOSIS — Z98.61 CAD S/P PERCUTANEOUS CORONARY ANGIOPLASTY: ICD-10-CM

## 2024-11-19 PROCEDURE — 2022F DILAT RTA XM EVC RTNOPTHY: CPT | Performed by: FAMILY MEDICINE

## 2024-11-19 PROCEDURE — G8484 FLU IMMUNIZE NO ADMIN: HCPCS | Performed by: FAMILY MEDICINE

## 2024-11-19 PROCEDURE — 1036F TOBACCO NON-USER: CPT | Performed by: FAMILY MEDICINE

## 2024-11-19 PROCEDURE — G8427 DOCREV CUR MEDS BY ELIG CLIN: HCPCS | Performed by: FAMILY MEDICINE

## 2024-11-19 PROCEDURE — 1123F ACP DISCUSS/DSCN MKR DOCD: CPT | Performed by: FAMILY MEDICINE

## 2024-11-19 PROCEDURE — G8417 CALC BMI ABV UP PARAM F/U: HCPCS | Performed by: FAMILY MEDICINE

## 2024-11-19 PROCEDURE — 3074F SYST BP LT 130 MM HG: CPT | Performed by: FAMILY MEDICINE

## 2024-11-19 PROCEDURE — 3017F COLORECTAL CA SCREEN DOC REV: CPT | Performed by: FAMILY MEDICINE

## 2024-11-19 PROCEDURE — 3044F HG A1C LEVEL LT 7.0%: CPT | Performed by: FAMILY MEDICINE

## 2024-11-19 PROCEDURE — 1159F MED LIST DOCD IN RCRD: CPT | Performed by: FAMILY MEDICINE

## 2024-11-19 PROCEDURE — 36415 COLL VENOUS BLD VENIPUNCTURE: CPT | Performed by: FAMILY MEDICINE

## 2024-11-19 PROCEDURE — 3078F DIAST BP <80 MM HG: CPT | Performed by: FAMILY MEDICINE

## 2024-11-19 PROCEDURE — 99214 OFFICE O/P EST MOD 30 MIN: CPT | Performed by: FAMILY MEDICINE

## 2024-11-19 NOTE — PATIENT INSTRUCTIONS
Please read the healthy family handout that you were given and share it with your family.       Please compare this printed medication list with your medications at home to be sure they are the same.  If you have any medications that are different please contact us immediately at [phone].     Also review your allergies that we have listed, these may also include medications that you have not been able to tolerate, make sure everything listed is correct. If you have any allergies that are different please contact us immediately at [phone].     You may receive a survey in the mail or by email asking about your experience during your visit today. Please complete and return to us so we know how we are serving you.

## 2024-11-19 NOTE — PROGRESS NOTES
Diabetic foot exam normal today with normal monofilament sensation testing noted. Dorsal pedal pulses palpable. No cyanosis noted of feet. No ulcerations of feet noted.

## 2024-11-19 NOTE — PROGRESS NOTES
He presents today for routine follow-up of his diabetes, chronic kidney disease, hypertension hyperlipidemia and CAD.  He is also due for blood work today.  He states his home blood sugars run anywhere from 116-1 45 range.  His last hemoglobin A1c was 6.9% on metformin.  He states he is going to Ortho and had an MRI at [facility] of his right shoulder otherwise things are going okay.  He still has chronic neuropathy pain and takes tramadol and Tylenol as needed and takes gabapentin.  Tests and documents reviewed today: Last lab work     Objective:   /62   Pulse 57   Temp 97.5 °F (36.4 °C) (Oral)   Wt 103.9 kg (229 lb)   SpO2 94%   BMI 29.01 kg/m²   BP Readings from Last 3 Encounters:   11/19/24 106/62   10/08/24 107/65   05/07/24 115/66     Physical Exam  Vitals reviewed.   Constitutional:       Appearance: He is well-developed. He is not toxic-appearing.   HENT:      Head: Normocephalic.   Neck:      Thyroid: No thyroid mass or thyromegaly.   Cardiovascular:      Rate and Rhythm: Normal rate and regular rhythm.      Heart sounds: Normal heart sounds. No murmur heard.  Pulmonary:      Effort: No respiratory distress.      Breath sounds: Normal breath sounds. No wheezing or rales.   Abdominal:      General: There is no distension.      Palpations: Abdomen is soft. There is no mass.      Tenderness: There is no abdominal tenderness. There is no guarding or rebound.   Musculoskeletal:      Cervical back: Neck supple.   Lymphadenopathy:      Cervical: No cervical adenopathy.      Upper Body:      Right upper body: No supraclavicular adenopathy.   Skin:     General: Skin is warm.   Neurological:      Mental Status: He is alert and oriented to person, place, and time.   Psychiatric:         Behavior: Behavior normal.         Thought Content: Thought content normal.         Judgment: Judgment normal.       Assessment and Plan:   Diagnosis Orders   1. Type 2 diabetes mellitus with diabetic polyneuropathy, without

## 2024-11-20 LAB
ALT SERPL-CCNC: 37 U/L (ref 10–40)
ANION GAP SERPL CALCULATED.3IONS-SCNC: 10 MMOL/L (ref 3–16)
BUN SERPL-MCNC: 23 MG/DL (ref 7–20)
CALCIUM SERPL-MCNC: 9.5 MG/DL (ref 8.3–10.6)
CHLORIDE SERPL-SCNC: 102 MMOL/L (ref 99–110)
CHOLEST SERPL-MCNC: 131 MG/DL (ref 0–199)
CO2 SERPL-SCNC: 27 MMOL/L (ref 21–32)
CREAT SERPL-MCNC: 1.1 MG/DL (ref 0.8–1.3)
EST. AVERAGE GLUCOSE BLD GHB EST-MCNC: 157.1 MG/DL
GFR SERPLBLD CREATININE-BSD FMLA CKD-EPI: 70 ML/MIN/{1.73_M2}
GLUCOSE SERPL-MCNC: 153 MG/DL (ref 70–99)
HBA1C MFR BLD: 7.1 %
HDLC SERPL-MCNC: 38 MG/DL (ref 40–60)
LDLC SERPL CALC-MCNC: 44 MG/DL
POTASSIUM SERPL-SCNC: 4.9 MMOL/L (ref 3.5–5.1)
PSA SERPL DL<=0.01 NG/ML-MCNC: 0.75 NG/ML (ref 0–4)
SODIUM SERPL-SCNC: 139 MMOL/L (ref 136–145)
TRIGL SERPL-MCNC: 247 MG/DL (ref 0–150)
VLDLC SERPL CALC-MCNC: 49 MG/DL

## 2024-12-26 DIAGNOSIS — M54.41 CHRONIC MIDLINE LOW BACK PAIN WITH BILATERAL SCIATICA: ICD-10-CM

## 2024-12-26 DIAGNOSIS — M54.42 CHRONIC MIDLINE LOW BACK PAIN WITH BILATERAL SCIATICA: ICD-10-CM

## 2024-12-26 DIAGNOSIS — G89.29 CHRONIC MIDLINE LOW BACK PAIN WITH BILATERAL SCIATICA: ICD-10-CM

## 2024-12-26 RX ORDER — TRAMADOL HYDROCHLORIDE 50 MG/1
50 TABLET ORAL EVERY 6 HOURS PRN
Qty: 120 TABLET | Refills: 1 | Status: SHIPPED | OUTPATIENT
Start: 2024-12-26 | End: 2025-02-24

## 2024-12-26 NOTE — TELEPHONE ENCOUNTER
Date of last refill of this med was 10/8, # of pills given 120 and # of refills given 1.  Their next appointment is 6/3, the last date patient was seen was 11/19.  Does patient have medication agreement on file? No  Has drug screen been done in last 12 months if needed? no

## 2024-12-26 NOTE — TELEPHONE ENCOUNTER
Controlled Substance Monitoring:    Acute and Chronic Pain Monitoring:   RX Monitoring Periodic Controlled Substance Monitoring   12/26/2024  12:55 PM No signs of potential drug abuse or diversion identified.

## 2025-01-03 DIAGNOSIS — E11.42 TYPE 2 DIABETES MELLITUS WITH DIABETIC POLYNEUROPATHY, WITHOUT LONG-TERM CURRENT USE OF INSULIN (HCC): ICD-10-CM

## 2025-01-03 RX ORDER — GABAPENTIN 300 MG/1
CAPSULE ORAL
Qty: 120 CAPSULE | Refills: 2 | Status: SHIPPED | OUTPATIENT
Start: 2025-01-03 | End: 2025-03-03

## 2025-02-01 DIAGNOSIS — I10 ESSENTIAL HYPERTENSION: ICD-10-CM

## 2025-02-01 DIAGNOSIS — G89.29 CHRONIC MIDLINE LOW BACK PAIN WITH BILATERAL SCIATICA: ICD-10-CM

## 2025-02-01 DIAGNOSIS — M54.41 CHRONIC MIDLINE LOW BACK PAIN WITH BILATERAL SCIATICA: ICD-10-CM

## 2025-02-01 DIAGNOSIS — M54.42 CHRONIC MIDLINE LOW BACK PAIN WITH BILATERAL SCIATICA: ICD-10-CM

## 2025-02-03 RX ORDER — LOSARTAN POTASSIUM 25 MG/1
TABLET ORAL
Qty: 90 TABLET | Refills: 1 | Status: SHIPPED | OUTPATIENT
Start: 2025-02-03

## 2025-02-03 RX ORDER — TRAMADOL HYDROCHLORIDE 50 MG/1
50 TABLET ORAL EVERY 6 HOURS PRN
Qty: 120 TABLET | Refills: 1 | Status: SHIPPED | OUTPATIENT
Start: 2025-02-03 | End: 2025-04-04

## 2025-02-03 NOTE — TELEPHONE ENCOUNTER
Date of last refill of this med was 12/26/24, # of pills given 120 and # of refills given 1.  Their next appointment is 7/22/25, the last date patient was seen was 11/19/24.  Does patient have medication agreement on file? No  Has drug screen been done in last 12 months if needed? N/A

## 2025-03-07 ENCOUNTER — TELEPHONE (OUTPATIENT)
Dept: FAMILY MEDICINE CLINIC | Age: 75
End: 2025-03-07

## 2025-03-07 DIAGNOSIS — B96.89 ACUTE BACTERIAL SINUSITIS: Primary | ICD-10-CM

## 2025-03-07 DIAGNOSIS — J01.90 ACUTE BACTERIAL SINUSITIS: Primary | ICD-10-CM

## 2025-03-07 NOTE — TELEPHONE ENCOUNTER
Patient has history of recurrent sinus infections.  He said he has had symptoms for about 3 to 4 days and was wanting to see if you would prescribe zpak for him to [facility]

## 2025-03-08 RX ORDER — AZITHROMYCIN 250 MG/1
TABLET, FILM COATED ORAL
Qty: 6 TABLET | Refills: 0 | Status: SHIPPED | OUTPATIENT
Start: 2025-03-08

## 2025-04-04 DIAGNOSIS — E11.42 TYPE 2 DIABETES MELLITUS WITH DIABETIC POLYNEUROPATHY, WITHOUT LONG-TERM CURRENT USE OF INSULIN (HCC): ICD-10-CM

## 2025-04-14 DIAGNOSIS — M54.41 CHRONIC MIDLINE LOW BACK PAIN WITH BILATERAL SCIATICA: ICD-10-CM

## 2025-04-14 DIAGNOSIS — M54.42 CHRONIC MIDLINE LOW BACK PAIN WITH BILATERAL SCIATICA: ICD-10-CM

## 2025-04-14 DIAGNOSIS — G89.29 CHRONIC MIDLINE LOW BACK PAIN WITH BILATERAL SCIATICA: ICD-10-CM

## 2025-04-14 RX ORDER — TRAMADOL HYDROCHLORIDE 50 MG/1
50 TABLET ORAL EVERY 6 HOURS PRN
Qty: 120 TABLET | Refills: 1 | Status: SHIPPED | OUTPATIENT
Start: 2025-04-14 | End: 2025-06-13

## 2025-04-30 DIAGNOSIS — E11.42 TYPE 2 DIABETES MELLITUS WITH DIABETIC POLYNEUROPATHY, WITHOUT LONG-TERM CURRENT USE OF INSULIN (HCC): ICD-10-CM

## 2025-04-30 RX ORDER — GABAPENTIN 300 MG/1
CAPSULE ORAL
Qty: 120 CAPSULE | Refills: 2 | Status: SHIPPED | OUTPATIENT
Start: 2025-04-30 | End: 2025-06-30

## 2025-05-28 ENCOUNTER — TELEPHONE (OUTPATIENT)
Dept: FAMILY MEDICINE CLINIC | Age: 75
End: 2025-05-28

## 2025-06-03 DIAGNOSIS — E11.42 TYPE 2 DIABETES MELLITUS WITH DIABETIC POLYNEUROPATHY, WITHOUT LONG-TERM CURRENT USE OF INSULIN (HCC): ICD-10-CM

## 2025-06-03 RX ORDER — GABAPENTIN 300 MG/1
CAPSULE ORAL
Qty: 120 CAPSULE | Refills: 1 | Status: SHIPPED | OUTPATIENT
Start: 2025-06-03 | End: 2025-07-03

## 2025-06-03 NOTE — TELEPHONE ENCOUNTER
Future appt scheduled 07/22/2025                     Last appt 11/19/2024    Last Written 04/30/2025    gabapentin (NEURONTIN) 300 MG capsule   #120  2 RF

## 2025-06-23 DIAGNOSIS — G89.29 CHRONIC MIDLINE LOW BACK PAIN WITH BILATERAL SCIATICA: ICD-10-CM

## 2025-06-23 DIAGNOSIS — M54.42 CHRONIC MIDLINE LOW BACK PAIN WITH BILATERAL SCIATICA: ICD-10-CM

## 2025-06-23 DIAGNOSIS — M54.41 CHRONIC MIDLINE LOW BACK PAIN WITH BILATERAL SCIATICA: ICD-10-CM

## 2025-06-23 RX ORDER — TRAMADOL HYDROCHLORIDE 50 MG/1
50 TABLET ORAL EVERY 6 HOURS PRN
Qty: 120 TABLET | Refills: 1 | Status: SHIPPED | OUTPATIENT
Start: 2025-06-23 | End: 2025-08-22

## 2025-06-23 NOTE — TELEPHONE ENCOUNTER
Date of last refill of this med was 4/14/25, # of pills given 120 and # of refills given 1.  Their next appointment is 7/22/25, the last date patient was seen was 11/19/24.  Does patient have medication agreement on file? No  Has drug screen been done in last 12 months if needed? no

## 2025-07-22 ENCOUNTER — OFFICE VISIT (OUTPATIENT)
Dept: FAMILY MEDICINE CLINIC | Age: 75
End: 2025-07-22
Payer: MEDICARE

## 2025-07-22 VITALS
TEMPERATURE: 98.3 F | SYSTOLIC BLOOD PRESSURE: 111 MMHG | HEART RATE: 52 BPM | DIASTOLIC BLOOD PRESSURE: 57 MMHG | WEIGHT: 232 LBS | OXYGEN SATURATION: 97 % | BODY MASS INDEX: 29.39 KG/M2

## 2025-07-22 DIAGNOSIS — M54.41 CHRONIC MIDLINE LOW BACK PAIN WITH BILATERAL SCIATICA: ICD-10-CM

## 2025-07-22 DIAGNOSIS — I25.10 CAD S/P PERCUTANEOUS CORONARY ANGIOPLASTY: ICD-10-CM

## 2025-07-22 DIAGNOSIS — G60.9 IDIOPATHIC PERIPHERAL NEUROPATHY: ICD-10-CM

## 2025-07-22 DIAGNOSIS — M54.42 CHRONIC MIDLINE LOW BACK PAIN WITH BILATERAL SCIATICA: ICD-10-CM

## 2025-07-22 DIAGNOSIS — Z98.61 CAD S/P PERCUTANEOUS CORONARY ANGIOPLASTY: ICD-10-CM

## 2025-07-22 DIAGNOSIS — G89.29 CHRONIC MIDLINE LOW BACK PAIN WITH BILATERAL SCIATICA: ICD-10-CM

## 2025-07-22 DIAGNOSIS — E11.42 TYPE 2 DIABETES MELLITUS WITH DIABETIC POLYNEUROPATHY, WITHOUT LONG-TERM CURRENT USE OF INSULIN (HCC): Primary | ICD-10-CM

## 2025-07-22 DIAGNOSIS — I10 ESSENTIAL HYPERTENSION: ICD-10-CM

## 2025-07-22 DIAGNOSIS — N18.31 STAGE 3A CHRONIC KIDNEY DISEASE (HCC): ICD-10-CM

## 2025-07-22 DIAGNOSIS — E78.2 MIXED HYPERLIPIDEMIA: ICD-10-CM

## 2025-07-22 LAB
ALT SERPL-CCNC: 42 U/L (ref 10–40)
ANION GAP SERPL CALCULATED.3IONS-SCNC: 11 MMOL/L (ref 3–16)
BUN SERPL-MCNC: 21 MG/DL (ref 7–20)
CALCIUM SERPL-MCNC: 9.5 MG/DL (ref 8.3–10.6)
CHLORIDE SERPL-SCNC: 100 MMOL/L (ref 99–110)
CHOLEST SERPL-MCNC: 131 MG/DL (ref 0–199)
CO2 SERPL-SCNC: 26 MMOL/L (ref 21–32)
CREAT SERPL-MCNC: 1.1 MG/DL (ref 0.8–1.3)
EST. AVERAGE GLUCOSE BLD GHB EST-MCNC: 159.9 MG/DL
GFR SERPLBLD CREATININE-BSD FMLA CKD-EPI: 70 ML/MIN/{1.73_M2}
GLUCOSE SERPL-MCNC: 159 MG/DL (ref 70–99)
HBA1C MFR BLD: 7.2 %
HDLC SERPL-MCNC: 37 MG/DL (ref 40–60)
LDLC SERPL CALC-MCNC: 43 MG/DL
POTASSIUM SERPL-SCNC: 5.1 MMOL/L (ref 3.5–5.1)
SODIUM SERPL-SCNC: 137 MMOL/L (ref 136–145)
TRIGL SERPL-MCNC: 257 MG/DL (ref 0–150)
VIT B12 SERPL-MCNC: 857 PG/ML (ref 211–911)
VLDLC SERPL CALC-MCNC: 51 MG/DL

## 2025-07-22 PROCEDURE — G2211 COMPLEX E/M VISIT ADD ON: HCPCS | Performed by: FAMILY MEDICINE

## 2025-07-22 PROCEDURE — G8427 DOCREV CUR MEDS BY ELIG CLIN: HCPCS | Performed by: FAMILY MEDICINE

## 2025-07-22 PROCEDURE — 1159F MED LIST DOCD IN RCRD: CPT | Performed by: FAMILY MEDICINE

## 2025-07-22 PROCEDURE — 3074F SYST BP LT 130 MM HG: CPT | Performed by: FAMILY MEDICINE

## 2025-07-22 PROCEDURE — 3017F COLORECTAL CA SCREEN DOC REV: CPT | Performed by: FAMILY MEDICINE

## 2025-07-22 PROCEDURE — 3046F HEMOGLOBIN A1C LEVEL >9.0%: CPT | Performed by: FAMILY MEDICINE

## 2025-07-22 PROCEDURE — 3078F DIAST BP <80 MM HG: CPT | Performed by: FAMILY MEDICINE

## 2025-07-22 PROCEDURE — 36415 COLL VENOUS BLD VENIPUNCTURE: CPT | Performed by: FAMILY MEDICINE

## 2025-07-22 PROCEDURE — G8417 CALC BMI ABV UP PARAM F/U: HCPCS | Performed by: FAMILY MEDICINE

## 2025-07-22 PROCEDURE — 99214 OFFICE O/P EST MOD 30 MIN: CPT | Performed by: FAMILY MEDICINE

## 2025-07-22 PROCEDURE — 1036F TOBACCO NON-USER: CPT | Performed by: FAMILY MEDICINE

## 2025-07-22 PROCEDURE — 1123F ACP DISCUSS/DSCN MKR DOCD: CPT | Performed by: FAMILY MEDICINE

## 2025-07-22 PROCEDURE — 2022F DILAT RTA XM EVC RTNOPTHY: CPT | Performed by: FAMILY MEDICINE

## 2025-07-22 RX ORDER — LOSARTAN POTASSIUM 25 MG/1
25 TABLET ORAL DAILY
Qty: 90 TABLET | Refills: 1 | Status: SHIPPED | OUTPATIENT
Start: 2025-07-22

## 2025-07-22 RX ORDER — DULOXETIN HYDROCHLORIDE 30 MG/1
30 CAPSULE, DELAYED RELEASE ORAL DAILY
Qty: 30 CAPSULE | Refills: 5 | Status: SHIPPED | OUTPATIENT
Start: 2025-07-22

## 2025-07-22 RX ORDER — TRAMADOL HYDROCHLORIDE 50 MG/1
50 TABLET ORAL EVERY 6 HOURS PRN
Qty: 120 TABLET | Refills: 1 | Status: SHIPPED | OUTPATIENT
Start: 2025-07-22 | End: 2025-09-20

## 2025-07-22 RX ORDER — METOPROLOL SUCCINATE 25 MG/1
25 TABLET, EXTENDED RELEASE ORAL DAILY
COMMUNITY
Start: 2025-06-03

## 2025-07-22 RX ORDER — GABAPENTIN 300 MG/1
CAPSULE ORAL
Qty: 120 CAPSULE | Refills: 1 | Status: SHIPPED | OUTPATIENT
Start: 2025-07-22 | End: 2025-08-21

## 2025-07-22 SDOH — ECONOMIC STABILITY: FOOD INSECURITY: WITHIN THE PAST 12 MONTHS, THE FOOD YOU BOUGHT JUST DIDN'T LAST AND YOU DIDN'T HAVE MONEY TO GET MORE.: NEVER TRUE

## 2025-07-22 SDOH — ECONOMIC STABILITY: FOOD INSECURITY: WITHIN THE PAST 12 MONTHS, YOU WORRIED THAT YOUR FOOD WOULD RUN OUT BEFORE YOU GOT MONEY TO BUY MORE.: NEVER TRUE

## 2025-07-22 ASSESSMENT — PATIENT HEALTH QUESTIONNAIRE - PHQ9
SUM OF ALL RESPONSES TO PHQ QUESTIONS 1-9: 0
1. LITTLE INTEREST OR PLEASURE IN DOING THINGS: NOT AT ALL
2. FEELING DOWN, DEPRESSED OR HOPELESS: NOT AT ALL
SUM OF ALL RESPONSES TO PHQ QUESTIONS 1-9: 0

## 2025-07-22 NOTE — PROGRESS NOTES
He presents today for routine follow-up of diabetes, hypertension, CAD, high cholesterol, neuropathy and chronic kidney disease.  Complaint is he still having neuropathy pain even with the gabapentin.  He states he uses tramadol for his back pain occasionally with Tylenol.  He states he does not always follow his diabetic diabetes still on the metformin.  Tests and documents reviewed today: Last labs     Objective:   BP (!) 111/57   Pulse 52   Temp 98.3 °F (36.8 °C) (Oral)   Wt 105.2 kg (232 lb)   SpO2 97%   BMI 29.39 kg/m²   BP Readings from Last 3 Encounters:   07/22/25 (!) 111/57   11/19/24 106/62   10/08/24 107/65     Physical Exam  Vitals reviewed.   Constitutional:       Appearance: He is well-developed. He is not toxic-appearing.   HENT:      Head: Normocephalic.   Neck:      Thyroid: No thyroid mass or thyromegaly.   Cardiovascular:      Rate and Rhythm: Normal rate and regular rhythm.      Heart sounds: Normal heart sounds. No murmur heard.  Pulmonary:      Effort: No respiratory distress.      Breath sounds: Normal breath sounds. No wheezing or rales.   Abdominal:      General: There is no distension.      Palpations: Abdomen is soft. There is no mass.      Tenderness: There is no abdominal tenderness. There is no guarding or rebound.   Musculoskeletal:      Cervical back: Neck supple.   Lymphadenopathy:      Cervical: No cervical adenopathy.      Upper Body:      Right upper body: No supraclavicular adenopathy.   Skin:     General: Skin is warm.   Neurological:      Mental Status: He is alert and oriented to person, place, and time.   Psychiatric:         Behavior: Behavior normal.         Thought Content: Thought content normal.         Judgment: Judgment normal.       Assessment and Plan:   Diagnosis Orders   1. Type 2 diabetes mellitus with diabetic polyneuropathy, without long-term current use of insulin (HCC)  gabapentin (NEURONTIN) 300 MG capsule    metFORMIN (GLUCOPHAGE) 500 MG tablet    ALT

## 2025-08-28 DIAGNOSIS — G89.29 CHRONIC MIDLINE LOW BACK PAIN WITH BILATERAL SCIATICA: ICD-10-CM

## 2025-08-28 DIAGNOSIS — M54.41 CHRONIC MIDLINE LOW BACK PAIN WITH BILATERAL SCIATICA: ICD-10-CM

## 2025-08-28 DIAGNOSIS — M54.42 CHRONIC MIDLINE LOW BACK PAIN WITH BILATERAL SCIATICA: ICD-10-CM

## 2025-08-29 RX ORDER — TRAMADOL HYDROCHLORIDE 50 MG/1
50 TABLET ORAL EVERY 6 HOURS PRN
Qty: 120 TABLET | Refills: 1 | Status: SHIPPED | OUTPATIENT
Start: 2025-08-29 | End: 2025-10-28